# Patient Record
Sex: FEMALE | Race: WHITE
[De-identification: names, ages, dates, MRNs, and addresses within clinical notes are randomized per-mention and may not be internally consistent; named-entity substitution may affect disease eponyms.]

---

## 2018-01-08 ENCOUNTER — HOSPITAL ENCOUNTER (EMERGENCY)
Dept: HOSPITAL 54 - ER | Age: 43
Discharge: LEFT BEFORE DISCHARGE | End: 2018-01-08
Payer: MEDICAID

## 2018-01-08 VITALS — WEIGHT: 220 LBS | HEIGHT: 64 IN | BODY MASS INDEX: 37.56 KG/M2

## 2018-01-08 VITALS — SYSTOLIC BLOOD PRESSURE: 139 MMHG | DIASTOLIC BLOOD PRESSURE: 50 MMHG

## 2018-01-08 DIAGNOSIS — Z79.4: ICD-10-CM

## 2018-01-08 DIAGNOSIS — I25.2: ICD-10-CM

## 2018-01-08 DIAGNOSIS — I10: ICD-10-CM

## 2018-01-08 DIAGNOSIS — I21.4: Primary | ICD-10-CM

## 2018-01-08 DIAGNOSIS — E66.9: ICD-10-CM

## 2018-01-08 DIAGNOSIS — E78.00: ICD-10-CM

## 2018-01-08 DIAGNOSIS — E11.9: ICD-10-CM

## 2018-01-08 DIAGNOSIS — Z79.82: ICD-10-CM

## 2018-01-08 DIAGNOSIS — Z88.0: ICD-10-CM

## 2018-01-08 LAB
APTT PPP: 27 SEC (ref 23–34)
BASOPHILS # BLD AUTO: 0.1 /CMM (ref 0–0.2)
BASOPHILS NFR BLD AUTO: 0.6 % (ref 0–2)
BUN SERPL-MCNC: 19 MG/DL (ref 7–18)
CALCIUM SERPL-MCNC: 8.9 MG/DL (ref 8.5–10.1)
CHLORIDE SERPL-SCNC: 101 MMOL/L (ref 98–107)
CO2 SERPL-SCNC: 23 MMOL/L (ref 21–32)
CREAT SERPL-MCNC: 1 MG/DL (ref 0.6–1.3)
EOSINOPHIL # BLD AUTO: 0.3 /CMM (ref 0–0.7)
EOSINOPHIL NFR BLD AUTO: 2 % (ref 0–6)
GLUCOSE SERPL-MCNC: 370 MG/DL (ref 74–106)
HCT VFR BLD AUTO: 35 % (ref 33–45)
HGB BLD-MCNC: 11.2 G/DL (ref 11.5–14.8)
INR PPP: 0.91 (ref 0.87–1.13)
LYMPHOCYTES NFR BLD AUTO: 16.9 % (ref 20–44)
LYMPHOCYTES NFR BLD AUTO: 2.2 /CMM (ref 0.8–4.8)
MCH RBC QN AUTO: 27 PG (ref 26–33)
MCHC RBC AUTO-ENTMCNC: 32 G/DL (ref 31–36)
MCV RBC AUTO: 84 FL (ref 82–100)
MONOCYTES NFR BLD AUTO: 0.9 /CMM (ref 0.1–1.3)
MONOCYTES NFR BLD AUTO: 6.9 % (ref 2–12)
NEUTROPHILS # BLD AUTO: 9.8 /CMM (ref 1.8–8.9)
NEUTROPHILS NFR BLD AUTO: 73.6 % (ref 43–81)
PLATELET # BLD AUTO: 415 /CMM (ref 150–450)
POTASSIUM SERPL-SCNC: 3.8 MMOL/L (ref 3.5–5.1)
RBC # BLD AUTO: 4.13 MIL/UL (ref 4–5.2)
RDW COEFFICIENT OF VARIATION: 15.1 (ref 11.5–15)
SODIUM SERPL-SCNC: 137 MMOL/L (ref 136–145)
TROPONIN I SERPL-MCNC: 0.22 NG/ML (ref 0–0.06)
WBC NRBC COR # BLD AUTO: 13.3 K/UL (ref 4.3–11)

## 2018-01-08 PROCEDURE — 84484 ASSAY OF TROPONIN QUANT: CPT

## 2018-01-08 PROCEDURE — 36415 COLL VENOUS BLD VENIPUNCTURE: CPT

## 2018-01-08 PROCEDURE — 99285 EMERGENCY DEPT VISIT HI MDM: CPT

## 2018-01-08 PROCEDURE — 85025 COMPLETE CBC W/AUTO DIFF WBC: CPT

## 2018-01-08 PROCEDURE — Z7610: HCPCS

## 2018-01-08 PROCEDURE — 87081 CULTURE SCREEN ONLY: CPT

## 2018-01-08 PROCEDURE — 93005 ELECTROCARDIOGRAM TRACING: CPT

## 2018-01-08 PROCEDURE — 96372 THER/PROPH/DIAG INJ SC/IM: CPT

## 2018-01-08 PROCEDURE — 80048 BASIC METABOLIC PNL TOTAL CA: CPT

## 2018-01-08 PROCEDURE — 71045 X-RAY EXAM CHEST 1 VIEW: CPT

## 2018-01-08 PROCEDURE — 85730 THROMBOPLASTIN TIME PARTIAL: CPT

## 2018-01-08 PROCEDURE — 96375 TX/PRO/DX INJ NEW DRUG ADDON: CPT

## 2018-01-08 PROCEDURE — A4606 OXYGEN PROBE USED W OXIMETER: HCPCS

## 2018-01-08 PROCEDURE — 96374 THER/PROPH/DIAG INJ IV PUSH: CPT

## 2018-01-08 NOTE — NUR
patient is resting in er bed, no distress noted, patient is on cardiac monitor. 
will continue to monitor

## 2018-01-08 NOTE — NUR
41 YO FEMALE BB SELF. PATIENT IS A/O X 3, C/O NOSE BLEED AND CHEST PAIN. 
PATIENT DESCRIBES PAIN AS PRESSURE LIKE, NON RADIATING. PATIENT AMBULATED TO ER 
BED, SKIN WARM AND DRY, RESP EVEN AND UNLABORED. PATIENT GOWNED,PLACED ON 
CARDIAC MONITOR. AWAITING ORDERS FROM PROVIDER, WILL CONTINUE TO MONITOR

## 2018-01-08 NOTE — NUR
Patient does not wish to proceed with medical care recommended by Dr. Laguna. 
Patient given information related to possible complications, up to and 
including death, which could occur as a result of leaving the hospital at this 
time. Patient verbalizes understanding of risks involved due to leaving against 
medical advice. Patient refused to sign AMA form.

## 2018-10-13 ENCOUNTER — HOSPITAL ENCOUNTER (EMERGENCY)
Dept: HOSPITAL 54 - ER | Age: 43
Discharge: HOME | End: 2018-10-13
Payer: MEDICAID

## 2018-10-13 VITALS — HEIGHT: 64 IN | BODY MASS INDEX: 39.27 KG/M2 | WEIGHT: 230 LBS

## 2018-10-13 VITALS — SYSTOLIC BLOOD PRESSURE: 142 MMHG | DIASTOLIC BLOOD PRESSURE: 65 MMHG

## 2018-10-13 DIAGNOSIS — I10: ICD-10-CM
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
## 2018-10-13 DIAGNOSIS — S80.12XD: Primary | ICD-10-CM

## 2018-10-13 DIAGNOSIS — Z79.82: ICD-10-CM

## 2018-10-13 DIAGNOSIS — E11.9: ICD-10-CM

## 2018-10-13 DIAGNOSIS — Z88.0: ICD-10-CM

## 2018-10-13 DIAGNOSIS — Z98.890: ICD-10-CM

## 2018-10-13 DIAGNOSIS — Z95.1: ICD-10-CM

## 2018-10-13 DIAGNOSIS — Z79.4: ICD-10-CM

## 2018-10-13 DIAGNOSIS — I25.2: ICD-10-CM

## 2018-10-13 DIAGNOSIS — E78.00: ICD-10-CM

## 2018-10-13 PROCEDURE — Z7610: HCPCS

## 2018-10-13 PROCEDURE — A6402 STERILE GAUZE <= 16 SQ IN: HCPCS

## 2018-10-13 PROCEDURE — A4606 OXYGEN PROBE USED W OXIMETER: HCPCS

## 2018-10-13 NOTE — NUR
BIB  C/O LEFT LEG PAIN AND BLEEDING FROM PREVIOUS SURGERY 8/10 [place] 
PAIN. RECENT HEART SURGERY 10/02/18 AT [facility]. , ATTACHED TO MONITOR , VSS 
AFEBRILE , WILL CONTINUE TO MONITOR

## 2019-04-11 ENCOUNTER — HOSPITAL ENCOUNTER (INPATIENT)
Dept: HOSPITAL 54 - ER | Age: 44
LOS: 1 days | Discharge: LEFT BEFORE DISCHARGE | DRG: 145 | End: 2019-04-12
Attending: INTERNAL MEDICINE | Admitting: INTERNAL MEDICINE
Payer: MEDICAID

## 2019-04-11 VITALS — BODY MASS INDEX: 39.95 KG/M2 | WEIGHT: 234 LBS | HEIGHT: 64 IN

## 2019-04-11 VITALS — DIASTOLIC BLOOD PRESSURE: 69 MMHG | SYSTOLIC BLOOD PRESSURE: 126 MMHG

## 2019-04-11 VITALS — SYSTOLIC BLOOD PRESSURE: 182 MMHG | DIASTOLIC BLOOD PRESSURE: 90 MMHG

## 2019-04-11 VITALS — SYSTOLIC BLOOD PRESSURE: 140 MMHG | DIASTOLIC BLOOD PRESSURE: 62 MMHG

## 2019-04-11 DIAGNOSIS — I12.9: ICD-10-CM

## 2019-04-11 DIAGNOSIS — I25.110: ICD-10-CM

## 2019-04-11 DIAGNOSIS — Z88.0: ICD-10-CM

## 2019-04-11 DIAGNOSIS — J20.9: Primary | ICD-10-CM

## 2019-04-11 DIAGNOSIS — N18.3: ICD-10-CM

## 2019-04-11 DIAGNOSIS — E11.22: ICD-10-CM

## 2019-04-11 DIAGNOSIS — K80.10: ICD-10-CM

## 2019-04-11 DIAGNOSIS — N17.9: ICD-10-CM

## 2019-04-11 DIAGNOSIS — Z95.1: ICD-10-CM

## 2019-04-11 DIAGNOSIS — E78.5: ICD-10-CM

## 2019-04-11 DIAGNOSIS — E66.9: ICD-10-CM

## 2019-04-11 LAB
BASOPHILS # BLD AUTO: 0.1 /CMM (ref 0–0.2)
BASOPHILS NFR BLD AUTO: 0.8 % (ref 0–2)
BUN SERPL-MCNC: 33 MG/DL (ref 7–18)
CALCIUM SERPL-MCNC: 8.7 MG/DL (ref 8.5–10.1)
CHLORIDE SERPL-SCNC: 102 MMOL/L (ref 98–107)
CO2 SERPL-SCNC: 23 MMOL/L (ref 21–32)
CREAT SERPL-MCNC: 2 MG/DL (ref 0.6–1.3)
EOSINOPHIL NFR BLD AUTO: 4.4 % (ref 0–6)
GLUCOSE SERPL-MCNC: 139 MG/DL (ref 74–106)
HCT VFR BLD AUTO: 33 % (ref 33–45)
HGB BLD-MCNC: 11 G/DL (ref 11.5–14.8)
LYMPHOCYTES NFR BLD AUTO: 19.4 % (ref 20–44)
LYMPHOCYTES NFR BLD AUTO: 2 /CMM (ref 0.8–4.8)
MCHC RBC AUTO-ENTMCNC: 33 G/DL (ref 31–36)
MCV RBC AUTO: 91 FL (ref 82–100)
MONOCYTES NFR BLD AUTO: 0.7 /CMM (ref 0.1–1.3)
MONOCYTES NFR BLD AUTO: 6.9 % (ref 2–12)
NEUTROPHILS # BLD AUTO: 7 /CMM (ref 1.8–8.9)
NEUTROPHILS NFR BLD AUTO: 68.5 % (ref 43–81)
PLATELET # BLD AUTO: 471 /CMM (ref 150–450)
POTASSIUM SERPL-SCNC: 4.3 MMOL/L (ref 3.5–5.1)
RBC # BLD AUTO: 3.65 MIL/UL (ref 4–5.2)
SODIUM SERPL-SCNC: 134 MMOL/L (ref 136–145)
WBC NRBC COR # BLD AUTO: 10.3 K/UL (ref 4.3–11)

## 2019-04-11 PROCEDURE — G0378 HOSPITAL OBSERVATION PER HR: HCPCS

## 2019-04-11 PROCEDURE — A9502 TC99M TETROFOSMIN: HCPCS

## 2019-04-11 RX ADMIN — Medication SCH EACH: at 18:15

## 2019-04-11 RX ADMIN — LINAGLIPTIN SCH MG: 5 TABLET, FILM COATED ORAL at 10:14

## 2019-04-11 RX ADMIN — Medication SCH EACH: at 22:21

## 2019-04-11 RX ADMIN — SPIRONOLACTONE SCH MG: 25 TABLET, FILM COATED ORAL at 10:15

## 2019-04-11 RX ADMIN — GLIMEPIRIDE SCH MG: 4 TABLET ORAL at 18:12

## 2019-04-11 RX ADMIN — Medication SCH MG: at 18:12

## 2019-04-11 RX ADMIN — GABAPENTIN SCH MG: 100 CAPSULE ORAL at 18:12

## 2019-04-11 RX ADMIN — FENOFIBRATE SCH MG: 145 TABLET ORAL at 10:14

## 2019-04-11 RX ADMIN — AMLODIPINE BESYLATE SCH MG: 10 TABLET ORAL at 10:15

## 2019-04-11 RX ADMIN — Medication PRN EACH: at 18:12

## 2019-04-11 RX ADMIN — GLIMEPIRIDE SCH MG: 4 TABLET ORAL at 10:16

## 2019-04-11 RX ADMIN — FERROUS SULFATE TAB 325 MG (65 MG ELEMENTAL FE) SCH MG: 325 (65 FE) TAB at 10:13

## 2019-04-11 RX ADMIN — AZITHROMYCIN DIHYDRATE SCH MG: 250 TABLET, FILM COATED ORAL at 18:15

## 2019-04-11 RX ADMIN — Medication SCH EACH: at 12:00

## 2019-04-11 RX ADMIN — GABAPENTIN SCH MG: 100 CAPSULE ORAL at 14:02

## 2019-04-11 RX ADMIN — FERROUS SULFATE TAB 325 MG (65 MG ELEMENTAL FE) SCH MG: 325 (65 FE) TAB at 18:12

## 2019-04-11 RX ADMIN — SODIUM CHLORIDE PRN MLS/HR: 4.5 INJECTION, SOLUTION INTRAVENOUS at 14:04

## 2019-04-11 RX ADMIN — Medication PRN EACH: at 10:07

## 2019-04-11 RX ADMIN — MONTELUKAST SODIUM SCH MG: 10 TABLET, FILM COATED ORAL at 10:13

## 2019-04-11 RX ADMIN — ASPIRIN 81 MG SCH MG: 81 TABLET ORAL at 10:14

## 2019-04-11 RX ADMIN — Medication SCH MG: at 10:14

## 2019-04-11 RX ADMIN — PANTOPRAZOLE SODIUM SCH MG: 40 TABLET, DELAYED RELEASE ORAL at 10:13

## 2019-04-11 RX ADMIN — Medication SCH MG: at 18:13

## 2019-04-11 RX ADMIN — GABAPENTIN SCH MG: 100 CAPSULE ORAL at 10:13

## 2019-04-11 NOTE — NUR
Patient lives at home locally with spouse. She is ambulatory and independent with adl's. 
Patient and  is requesting transfer to [facility] where she has a scheduled 
urology procedure to be done this Tuesday by Dr. Barry Vincent [phone]. Spoke with Traci 
wale haile mgr at [place] [phone], she will contact the transfer team for 
coordination.   










-------------------------------------------------------------------------------

Addendum: 04/11/19 at 2043 by KRISTA GONZALES RN

-------------------------------------------------------------------------------

Amended: Links added.

## 2019-04-11 NOTE — NUR
CHANDA NOTES



HS ACCUCHECK . Pt WILL BE NPO STARTING MN TONIGHT. DID NOT ADMINISTER INSULIN 
COVERAGE. 

-------------------------------------------------------------------------------

Addendum: 04/11/19 at 2234 by ERIN RICHARDS RN

-------------------------------------------------------------------------------

SPOKE WITH CHARGE CHANDA ELLIS TO GIVE INSULIN TONIGHT. WILL ADMINISTER 2UN OF INSULIN PER SLIDING 
SCALE.

## 2019-04-11 NOTE — NUR
MS RN

RECEIVED A NEW ADMISSION, 44 YEAR OLD FEMALE, CAME IN W/ CC OF CHEST PAIN,HIGH B/P, NOT IN 
DISTRESS, AWAKE,ALERT,ORIENTED X4, WILL MONITOR  PATIENT'S CONDITION,ALL NEEDS ATTENDED.

## 2019-04-11 NOTE — NUR
RN NOTES



SPOKE WITH CM ON THE PHONE. SAID Pt WILL NOT BE TRANSFERRED TONIGHT, INSTEAD SHE WILL BE 
TRANSFERRED TO [facility] TOMORROW AFTER THE STRESS TEST IS DONE AND Pt IS MEDICALLY 
CLEARED TO BE TRANSFERRED.

## 2019-04-11 NOTE — NUR
RN OPENING NOTES



RECEIVED REPORT FROM DAYSHIFT RN SUDHA. FOUND Pt ASLEEP IN BED, EASILY AWAKENED. NO S/S OF 
ACUTE DISTRESS  OR SOB NOTED. Pt IS A/OX4, VERBAL, ABLE TO MAKE NEEDS KNOWN. NO C/O PAIN AT 
THIS TIME. IV ACCESS ON RWRIST #22G, IVF 1/2 NS @100ML/HR. Pt IS SCHEDULED TO HAVE STRESS 
TEST TOMORROW IN AM; CONSENT SIGNED, PLACED IN CHART. SAFETY MEASURES IN PLACE. BED LOW, 
LOCKED, HOB ELEVATED, SIDE RAILS UP, CALL LIGHT AND BEDSIDE TABLE WITHIN REACH. WILL 
CONTINUE TO MONITOR Pt's CONDITION AND SAFETY THROUGHOUT THE NIGHT.

## 2019-04-11 NOTE — NUR
PT HYPERTENSIVE ON THE MONITOR. ER MD AWARE. VERBAL ORDER OF 10MG HYDRALAZINE 
IVP. WILL CARRY OUT ORDERS.

## 2019-04-11 NOTE — NUR
PT BIBSELF COMPLAINING OF L FLANK PAIN, WITH CHEST PAIN. PT AXO4. PT ANXIOUS, 
RESPIRATIONS EVEN AND UNLABORED. PT PUT ON THE CARDIAC MONITOR AND PULSE OX.

## 2019-04-11 NOTE — NUR
Spoke with Oralia JONES at [facility], patient is not cleared for transfer per 
, patient will need stress test tomorrow and will decide if patient is cleared for 
the urology procedure. [facility]  will coordinate transfer if cleared by 
Dr. Fisher and cardiology.  and patient is aware and agreed with current plan of care.  
 

-------------------------------------------------------------------------------

Addendum: 04/11/19 at 2050 by KRISTA GONZALES RN

-------------------------------------------------------------------------------

Amended: Links added.

## 2019-04-12 VITALS — DIASTOLIC BLOOD PRESSURE: 67 MMHG | SYSTOLIC BLOOD PRESSURE: 149 MMHG

## 2019-04-12 VITALS — DIASTOLIC BLOOD PRESSURE: 70 MMHG | SYSTOLIC BLOOD PRESSURE: 129 MMHG

## 2019-04-12 VITALS — SYSTOLIC BLOOD PRESSURE: 125 MMHG | DIASTOLIC BLOOD PRESSURE: 68 MMHG

## 2019-04-12 LAB
BASOPHILS # BLD AUTO: 0.1 /CMM (ref 0–0.2)
BASOPHILS NFR BLD AUTO: 0.7 % (ref 0–2)
BUN SERPL-MCNC: 31 MG/DL (ref 7–18)
CALCIUM SERPL-MCNC: 8.9 MG/DL (ref 8.5–10.1)
CHLORIDE SERPL-SCNC: 103 MMOL/L (ref 98–107)
CO2 SERPL-SCNC: 22 MMOL/L (ref 21–32)
CREAT SERPL-MCNC: 1.8 MG/DL (ref 0.6–1.3)
EOSINOPHIL NFR BLD AUTO: 4.7 % (ref 0–6)
GLUCOSE SERPL-MCNC: 129 MG/DL (ref 74–106)
HCT VFR BLD AUTO: 32 % (ref 33–45)
HGB BLD-MCNC: 10.7 G/DL (ref 11.5–14.8)
LYMPHOCYTES NFR BLD AUTO: 1.5 /CMM (ref 0.8–4.8)
LYMPHOCYTES NFR BLD AUTO: 17.5 % (ref 20–44)
MCHC RBC AUTO-ENTMCNC: 34 G/DL (ref 31–36)
MCV RBC AUTO: 89 FL (ref 82–100)
MONOCYTES NFR BLD AUTO: 0.8 /CMM (ref 0.1–1.3)
MONOCYTES NFR BLD AUTO: 8.7 % (ref 2–12)
NEUTROPHILS # BLD AUTO: 6 /CMM (ref 1.8–8.9)
NEUTROPHILS NFR BLD AUTO: 68.4 % (ref 43–81)
PLATELET # BLD AUTO: 366 /CMM (ref 150–450)
POTASSIUM SERPL-SCNC: 5.2 MMOL/L (ref 3.5–5.1)
RBC # BLD AUTO: 3.58 MIL/UL (ref 4–5.2)
SODIUM SERPL-SCNC: 135 MMOL/L (ref 136–145)
WBC NRBC COR # BLD AUTO: 8.8 K/UL (ref 4.3–11)

## 2019-04-12 RX ADMIN — PANTOPRAZOLE SODIUM SCH MG: 40 TABLET, DELAYED RELEASE ORAL at 09:50

## 2019-04-12 RX ADMIN — Medication SCH MG: at 09:51

## 2019-04-12 RX ADMIN — Medication PRN EACH: at 14:28

## 2019-04-12 RX ADMIN — Medication PRN EACH: at 09:49

## 2019-04-12 RX ADMIN — GLIMEPIRIDE SCH MG: 4 TABLET ORAL at 09:49

## 2019-04-12 RX ADMIN — AZITHROMYCIN DIHYDRATE SCH MG: 250 TABLET, FILM COATED ORAL at 13:20

## 2019-04-12 RX ADMIN — AMLODIPINE BESYLATE SCH MG: 10 TABLET ORAL at 09:50

## 2019-04-12 RX ADMIN — Medication SCH EACH: at 06:30

## 2019-04-12 RX ADMIN — GABAPENTIN SCH MG: 100 CAPSULE ORAL at 13:20

## 2019-04-12 RX ADMIN — MONTELUKAST SODIUM SCH MG: 10 TABLET, FILM COATED ORAL at 09:50

## 2019-04-12 RX ADMIN — SPIRONOLACTONE SCH MG: 25 TABLET, FILM COATED ORAL at 09:49

## 2019-04-12 RX ADMIN — FERROUS SULFATE TAB 325 MG (65 MG ELEMENTAL FE) SCH MG: 325 (65 FE) TAB at 09:50

## 2019-04-12 RX ADMIN — Medication SCH MG: at 09:50

## 2019-04-12 RX ADMIN — SODIUM CHLORIDE PRN MLS/HR: 4.5 INJECTION, SOLUTION INTRAVENOUS at 02:30

## 2019-04-12 RX ADMIN — Medication SCH EACH: at 12:12

## 2019-04-12 RX ADMIN — LINAGLIPTIN SCH MG: 5 TABLET, FILM COATED ORAL at 09:50

## 2019-04-12 RX ADMIN — FENOFIBRATE SCH MG: 145 TABLET ORAL at 09:49

## 2019-04-12 RX ADMIN — GABAPENTIN SCH MG: 100 CAPSULE ORAL at 09:49

## 2019-04-12 RX ADMIN — ASPIRIN 81 MG SCH MG: 81 TABLET ORAL at 09:49

## 2019-04-12 NOTE — NUR
MS RN NOTES



PATIENT LEFT HOSPITAL AGAINST MEDICAL ADVICE. VERBALIZED SHE DOES NOT WANT TO WAIT FOR 
TRANSPORTATION TO BE ARRANGED. ALSO VERBALIZED THAT SHE AND THE  WANT TO GO TO 
[facility] AS THE PATIENT HAD PRIOR HOSPITALIZATION THERE AND THAT THEY HAVE 
THE PATIENT'S RECORDS. RISKS AND BENEFITS EXPLAINED BUT TO NO AVAIL. PATIENT STATED THAT SHE 
WANTS TO LEAVE IMMEDIATELY. DR. ANGEL MADE AWARE AND GAVE OK. PATIENT SIGNED AMA FORM. ALL 
BELONGINGS COMPLETE, NO REPORT OF MISSING INVENTORY. IV REMOVED WITH MINIMAL BLEEDING NOTED, 
PRESSURE DRESSING PLACED ON SITE. ID BAND REMOVED. PATIENT LEFT UNIT AMBULATORY, ACCOMPANIED 
BY NURSING STAFF. PATIENT LEFT AMBULATORY. NO CHANGES IN LOC NOTED. DENIES ANY PAIN OR 
DISCOMFORT. NO ACUTE DISTRESS. NO NEW SKIN BREAKDOWN NOTED. LEFT HOSPITAL PREMISES VIA 
PRIVATE CAR WITH  GENE.

## 2019-04-12 NOTE — NUR
MS RN NOTES



RECEIVED CALL FROM CASE MANAGEMENT. REPORTING THAT PATIENT WILL BE TRANSFERRED TO [facility] FOR CARDIAC CATHETERIZATION. SPOKE WITH DR. ANGEL OVER THE PHONE STATED THAT HE IS 
AWARE. PATIENT AND  GENE AT BEDSIDE MADE AWARE AND VERBALIZED UNDERSTANDING.

## 2019-04-12 NOTE — NUR
RN CLOSING NOTES



NO SIGNIFICANT CHANGES IN Pt's CONDITION. Pt REMAINS STABLE AT THIS TIME. NO S/S OF ACUTE 
DISTRESS OR SOB NOTED DURING THE NIGHT. ALL NEEDS MET AND ATTENDED TO. Pt IS CURRENTLY 
RESTING IN BED WITH EVEN AND UNLABORED RESPIRATIONS. TELE READING SR 70s. SAFETY MEASURES IN 
PLACE. WILL ENDORSE TO DAYSHIFT RN FOR Pt's FLORENCE. Pt HAS BEEN NPO SINCE MIDNIGHT TONIGHT. 

-------------------------------------------------------------------------------

Addendum: 04/12/19 at 0641 by ERIN RICHARDS RN

-------------------------------------------------------------------------------

STRESS TEST TODAY

## 2019-04-12 NOTE — NUR
TELE RN NOTES



PATIENT RECEIVED RESTING INSIDE ROOM. AWAKE, ALERT AND ORIENTED X 3, VERBALLY RESPONSIVE AND 
RESPONDS TO VERBAL AND TACTILE STIMULI. NO ACUTE DISTRESS AT THIS TIME. NO CHANGES IN LOC 
NOTED AT THIS TIME. PATIENT CALM AND RELAXED. ON NPO STATUS. AWAITING FOR STRESS TEST. 
PATIENT AWARE AND VERBALIZED UNDERSTANDING. WILL CONTINUE TO MONITOR. BED LOCKED AND IN LOW 
POSITION. BILATERAL UPPER SIDE RAILS UP AND LOCKED. CALL LIGHT WITHIN EASY REACH

## 2019-04-22 ENCOUNTER — HOSPITAL ENCOUNTER (EMERGENCY)
Dept: HOSPITAL 54 - ER | Age: 44
Discharge: HOME | End: 2019-04-22
Payer: MEDICAID

## 2019-04-22 VITALS — BODY MASS INDEX: 41.41 KG/M2 | WEIGHT: 225 LBS | HEIGHT: 62 IN

## 2019-04-22 VITALS — SYSTOLIC BLOOD PRESSURE: 188 MMHG | DIASTOLIC BLOOD PRESSURE: 100 MMHG

## 2019-04-22 DIAGNOSIS — Z79.4: ICD-10-CM

## 2019-04-22 DIAGNOSIS — Z88.0: ICD-10-CM

## 2019-04-22 DIAGNOSIS — Z95.5: ICD-10-CM

## 2019-04-22 DIAGNOSIS — I25.10: ICD-10-CM

## 2019-04-22 DIAGNOSIS — Z90.710: ICD-10-CM

## 2019-04-22 DIAGNOSIS — Z95.1: ICD-10-CM

## 2019-04-22 DIAGNOSIS — I10: ICD-10-CM

## 2019-04-22 DIAGNOSIS — N39.0: Primary | ICD-10-CM

## 2019-04-22 DIAGNOSIS — E11.9: ICD-10-CM

## 2019-04-22 DIAGNOSIS — Z98.890: ICD-10-CM

## 2019-04-22 DIAGNOSIS — Z79.82: ICD-10-CM

## 2019-04-22 DIAGNOSIS — J45.909: ICD-10-CM

## 2019-04-22 LAB
ALBUMIN SERPL BCP-MCNC: 3.6 G/DL (ref 3.4–5)
ALP SERPL-CCNC: 63 U/L (ref 46–116)
ALT SERPL W P-5'-P-CCNC: 25 U/L (ref 12–78)
APAP SERPL-MCNC: 0 UG/ML (ref 10–30)
APPEARANCE UR: (no result)
AST SERPL W P-5'-P-CCNC: 18 U/L (ref 15–37)
BASOPHILS # BLD AUTO: 0.1 /CMM (ref 0–0.2)
BASOPHILS NFR BLD AUTO: 0.7 % (ref 0–2)
BILIRUB DIRECT SERPL-MCNC: 0.1 MG/DL (ref 0–0.2)
BILIRUB SERPL-MCNC: 0.2 MG/DL (ref 0.2–1)
BUN SERPL-MCNC: 20 MG/DL (ref 7–18)
CALCIUM SERPL-MCNC: 9.1 MG/DL (ref 8.5–10.1)
CHLORIDE SERPL-SCNC: 101 MMOL/L (ref 98–107)
CO2 SERPL-SCNC: 29 MMOL/L (ref 21–32)
CREAT SERPL-MCNC: 1.6 MG/DL (ref 0.6–1.3)
EOSINOPHIL NFR BLD AUTO: 11.6 % (ref 0–6)
GLUCOSE SERPL-MCNC: 167 MG/DL (ref 74–106)
GLUCOSE UR STRIP-MCNC: (no result) MG/DL
HCT VFR BLD AUTO: 35 % (ref 33–45)
HGB BLD-MCNC: 11.4 G/DL (ref 11.5–14.8)
LIPASE SERPL-CCNC: 229 U/L (ref 73–393)
LYMPHOCYTES NFR BLD AUTO: 1.5 /CMM (ref 0.8–4.8)
LYMPHOCYTES NFR BLD AUTO: 12.1 % (ref 20–44)
MCHC RBC AUTO-ENTMCNC: 33 G/DL (ref 31–36)
MCV RBC AUTO: 91 FL (ref 82–100)
MONOCYTES NFR BLD AUTO: 0.8 /CMM (ref 0.1–1.3)
MONOCYTES NFR BLD AUTO: 6.8 % (ref 2–12)
NEUTROPHILS # BLD AUTO: 8.5 /CMM (ref 1.8–8.9)
NEUTROPHILS NFR BLD AUTO: 68.8 % (ref 43–81)
PH UR STRIP: 5.5 [PH] (ref 5–8)
PLATELET # BLD AUTO: 504 /CMM (ref 150–450)
POTASSIUM SERPL-SCNC: 4.1 MMOL/L (ref 3.5–5.1)
PROT SERPL-MCNC: 7.9 G/DL (ref 6.4–8.2)
PROT UR QL STRIP: 30 MG/DL
RBC # BLD AUTO: 3.82 MIL/UL (ref 4–5.2)
RBC #/AREA URNS HPF: (no result) /HPF (ref 0–2)
SODIUM SERPL-SCNC: 138 MMOL/L (ref 136–145)
UROBILINOGEN UR STRIP-MCNC: 0.2 EU/DL
WBC #/AREA URNS HPF: (no result) /HPF
WBC #/AREA URNS HPF: (no result) /HPF (ref 0–3)
WBC NRBC COR # BLD AUTO: 12.4 K/UL (ref 4.3–11)

## 2019-04-22 PROCEDURE — 74176 CT ABD & PELVIS W/O CONTRAST: CPT

## 2019-04-22 PROCEDURE — 83690 ASSAY OF LIPASE: CPT

## 2019-04-22 PROCEDURE — 71045 X-RAY EXAM CHEST 1 VIEW: CPT

## 2019-04-22 PROCEDURE — 81001 URINALYSIS AUTO W/SCOPE: CPT

## 2019-04-22 PROCEDURE — 80076 HEPATIC FUNCTION PANEL: CPT

## 2019-04-22 PROCEDURE — 96375 TX/PRO/DX INJ NEW DRUG ADDON: CPT

## 2019-04-22 PROCEDURE — 99284 EMERGENCY DEPT VISIT MOD MDM: CPT

## 2019-04-22 PROCEDURE — 80048 BASIC METABOLIC PNL TOTAL CA: CPT

## 2019-04-22 PROCEDURE — 84703 CHORIONIC GONADOTROPIN ASSAY: CPT

## 2019-04-22 PROCEDURE — 93005 ELECTROCARDIOGRAM TRACING: CPT

## 2019-04-22 PROCEDURE — 36415 COLL VENOUS BLD VENIPUNCTURE: CPT

## 2019-04-22 PROCEDURE — 85025 COMPLETE CBC W/AUTO DIFF WBC: CPT

## 2019-04-22 PROCEDURE — 96374 THER/PROPH/DIAG INJ IV PUSH: CPT

## 2019-04-22 PROCEDURE — G0480 DRUG TEST DEF 1-7 CLASSES: HCPCS

## 2019-04-22 PROCEDURE — 84484 ASSAY OF TROPONIN QUANT: CPT

## 2019-04-22 NOTE — NUR
PT BIBFAMILY FOR LT SIDE BACK PAIN S/P SX ON FRIDAY; PT AAOX4, PT AMBULATORY, 
NAD NOTED, VSS, PENDING MD VIVEROS

## 2019-05-05 ENCOUNTER — HOSPITAL ENCOUNTER (EMERGENCY)
Dept: HOSPITAL 10 - E/R | Age: 44
LOS: 1 days | Discharge: HOME | End: 2019-05-06
Payer: COMMERCIAL

## 2019-05-05 ENCOUNTER — HOSPITAL ENCOUNTER (EMERGENCY)
Dept: HOSPITAL 91 - E/R | Age: 44
LOS: 1 days | Discharge: HOME | End: 2019-05-06
Payer: COMMERCIAL

## 2019-05-05 VITALS
WEIGHT: 244.05 LBS | WEIGHT: 244.05 LBS | BODY MASS INDEX: 44.91 KG/M2 | BODY MASS INDEX: 44.91 KG/M2 | HEIGHT: 62 IN | HEIGHT: 62 IN

## 2019-05-05 DIAGNOSIS — R10.84: ICD-10-CM

## 2019-05-05 DIAGNOSIS — I10: ICD-10-CM

## 2019-05-05 DIAGNOSIS — N28.9: Primary | ICD-10-CM

## 2019-05-05 DIAGNOSIS — J45.909: ICD-10-CM

## 2019-05-05 DIAGNOSIS — Z79.4: ICD-10-CM

## 2019-05-05 DIAGNOSIS — Z95.1: ICD-10-CM

## 2019-05-05 DIAGNOSIS — Z79.82: ICD-10-CM

## 2019-05-05 DIAGNOSIS — E11.9: ICD-10-CM

## 2019-05-05 LAB
ADD MAN DIFF?: NO
ALANINE AMINOTRANSFERASE: 17 IU/L (ref 13–69)
ALBUMIN/GLOBULIN RATIO: 1.33
ALBUMIN: 4.8 G/DL (ref 3.3–4.9)
ALKALINE PHOSPHATASE: 66 IU/L (ref 42–121)
ANION GAP: 14 (ref 5–13)
ASPARTATE AMINO TRANSFERASE: 21 IU/L (ref 15–46)
BASOPHIL #: 0.2 10^3/UL (ref 0–0.1)
BASOPHILS %: 1.7 % (ref 0–2)
BILIRUBIN,DIRECT: 0 MG/DL (ref 0–0.2)
BILIRUBIN,TOTAL: 0 MG/DL (ref 0.2–1.3)
BLOOD UREA NITROGEN: 41 MG/DL (ref 7–20)
CALCIUM: 9.5 MG/DL (ref 8.4–10.2)
CARBON DIOXIDE: 23 MMOL/L (ref 21–31)
CHLORIDE: 104 MMOL/L (ref 97–110)
CREATININE: 2.07 MG/DL (ref 0.44–1)
EOSINOPHILS #: 1.2 10^3/UL (ref 0–0.5)
EOSINOPHILS %: 11.3 % (ref 0–7)
GLOBULIN: 3.6 G/DL (ref 1.3–3.2)
GLUCOSE: 185 MG/DL (ref 70–220)
HEMATOCRIT: 35.8 % (ref 37–47)
HEMOGLOBIN: 11.5 G/DL (ref 12–16)
IMMATURE GRANS #M: 0.02 10^3/UL (ref 0–0.03)
IMMATURE GRANS % (M): 0.2 % (ref 0–0.43)
LIPASE: 370 U/L (ref 23–300)
LYMPHOCYTES #: 2.5 10^3/UL (ref 0.8–2.9)
LYMPHOCYTES %: 25 % (ref 15–51)
MEAN CORPUSCULAR HEMOGLOBIN: 29.2 PG (ref 29–33)
MEAN CORPUSCULAR HGB CONC: 32.1 G/DL (ref 32–37)
MEAN CORPUSCULAR VOLUME: 90.9 FL (ref 82–101)
MEAN PLATELET VOLUME: 12.6 FL (ref 7.4–10.4)
MONOCYTE #: 1.1 10^3/UL (ref 0.3–0.9)
MONOCYTES %: 10.7 % (ref 0–11)
NEUTROPHIL #: 5.2 10^3/UL (ref 1.6–7.5)
NEUTROPHILS %: 51.1 % (ref 39–77)
NUCLEATED RED BLOOD CELLS #: 0 10^3/UL (ref 0–0)
NUCLEATED RED BLOOD CELLS%: 0 /100WBC (ref 0–0)
PLATELET COUNT: 497 10^3/UL (ref 140–415)
POTASSIUM: 3.9 MMOL/L (ref 3.5–5.1)
RED BLOOD COUNT: 3.94 10^6/UL (ref 4.2–5.4)
RED CELL DISTRIBUTION WIDTH: 13.2 % (ref 11.5–14.5)
SODIUM: 141 MMOL/L (ref 135–144)
TOTAL PROTEIN: 8.4 G/DL (ref 6.1–8.1)
TROPONIN-I: 0.02 NG/ML (ref 0–0.12)
WHITE BLOOD COUNT: 10.2 10^3/UL (ref 4.8–10.8)

## 2019-05-05 PROCEDURE — 74176 CT ABD & PELVIS W/O CONTRAST: CPT

## 2019-05-05 PROCEDURE — 99285 EMERGENCY DEPT VISIT HI MDM: CPT

## 2019-05-05 PROCEDURE — 96375 TX/PRO/DX INJ NEW DRUG ADDON: CPT

## 2019-05-05 PROCEDURE — 85025 COMPLETE CBC W/AUTO DIFF WBC: CPT

## 2019-05-05 PROCEDURE — 93306 TTE W/DOPPLER COMPLETE: CPT

## 2019-05-05 PROCEDURE — 96374 THER/PROPH/DIAG INJ IV PUSH: CPT

## 2019-05-05 PROCEDURE — 82550 ASSAY OF CK (CPK): CPT

## 2019-05-05 PROCEDURE — 84484 ASSAY OF TROPONIN QUANT: CPT

## 2019-05-05 PROCEDURE — 83690 ASSAY OF LIPASE: CPT

## 2019-05-05 PROCEDURE — 71045 X-RAY EXAM CHEST 1 VIEW: CPT

## 2019-05-05 PROCEDURE — 80053 COMPREHEN METABOLIC PANEL: CPT

## 2019-05-05 PROCEDURE — 93005 ELECTROCARDIOGRAM TRACING: CPT

## 2019-05-05 PROCEDURE — 82553 CREATINE MB FRACTION: CPT

## 2019-05-05 PROCEDURE — 36415 COLL VENOUS BLD VENIPUNCTURE: CPT

## 2019-05-05 RX ADMIN — ONDANSETRON HYDROCHLORIDE 1 MG: 2 INJECTION, SOLUTION INTRAMUSCULAR; INTRAVENOUS at 22:51

## 2019-05-05 RX ADMIN — NITROGLYCERIN 1 INCH: 20 OINTMENT TOPICAL at 22:56

## 2019-05-06 VITALS — SYSTOLIC BLOOD PRESSURE: 184 MMHG | HEART RATE: 59 BPM | DIASTOLIC BLOOD PRESSURE: 92 MMHG | RESPIRATION RATE: 16 BRPM

## 2019-05-06 LAB
CK INDEX: 2.3
CK-MB: 2.8 NG/ML (ref 0–2.4)
CREATINE KINASE: 123 IU/L (ref 23–200)
TROPONIN-I: 0.02 NG/ML (ref 0–0.12)

## 2019-05-06 RX ADMIN — HYDROMORPHONE HYDROCHLORIDE 1 MG: 2 INJECTION, SOLUTION INTRAMUSCULAR; INTRAVENOUS; SUBCUTANEOUS at 08:24

## 2019-05-06 RX ADMIN — DIPHENHYDRAMINE HYDROCHLORIDE 1 MG: 50 INJECTION, SOLUTION INTRAMUSCULAR; INTRAVENOUS at 01:22

## 2019-06-03 ENCOUNTER — HOSPITAL ENCOUNTER (INPATIENT)
Dept: HOSPITAL 12 - ER | Age: 44
LOS: 1 days | Discharge: NURSING, REHAB OR LONG-TERM CARE | DRG: 198 | End: 2019-06-04
Admitting: INTERNAL MEDICINE
Payer: MEDICAID

## 2019-06-03 VITALS — HEIGHT: 62 IN | BODY MASS INDEX: 38.64 KG/M2 | WEIGHT: 210 LBS

## 2019-06-03 DIAGNOSIS — E78.5: ICD-10-CM

## 2019-06-03 DIAGNOSIS — Z95.1: ICD-10-CM

## 2019-06-03 DIAGNOSIS — N18.4: ICD-10-CM

## 2019-06-03 DIAGNOSIS — E11.65: ICD-10-CM

## 2019-06-03 DIAGNOSIS — E66.9: ICD-10-CM

## 2019-06-03 DIAGNOSIS — F32.9: ICD-10-CM

## 2019-06-03 DIAGNOSIS — E11.51: ICD-10-CM

## 2019-06-03 DIAGNOSIS — K59.00: ICD-10-CM

## 2019-06-03 DIAGNOSIS — I25.118: Primary | ICD-10-CM

## 2019-06-03 DIAGNOSIS — Z79.82: ICD-10-CM

## 2019-06-03 DIAGNOSIS — D64.9: ICD-10-CM

## 2019-06-03 DIAGNOSIS — E11.40: ICD-10-CM

## 2019-06-03 DIAGNOSIS — I50.9: ICD-10-CM

## 2019-06-03 DIAGNOSIS — Z95.5: ICD-10-CM

## 2019-06-03 DIAGNOSIS — I31.3: ICD-10-CM

## 2019-06-03 DIAGNOSIS — I13.0: ICD-10-CM

## 2019-06-03 DIAGNOSIS — E11.22: ICD-10-CM

## 2019-06-03 LAB
ALP SERPL-CCNC: 64 U/L (ref 50–136)
ALT SERPL W/O P-5'-P-CCNC: 17 U/L (ref 14–59)
AST SERPL-CCNC: 12 U/L (ref 15–37)
BASOPHILS # BLD AUTO: 0 K/UL (ref 0–8)
BASOPHILS NFR BLD AUTO: 0.2 % (ref 0–2)
BILIRUB DIRECT SERPL-MCNC: 0.1 MG/DL (ref 0–0.2)
BILIRUB SERPL-MCNC: 0.3 MG/DL (ref 0.2–1)
BUN SERPL-MCNC: 38 MG/DL (ref 7–18)
CHLORIDE SERPL-SCNC: 96 MMOL/L (ref 98–107)
CO2 SERPL-SCNC: 24 MMOL/L (ref 21–32)
CREAT SERPL-MCNC: 2.5 MG/DL (ref 0.6–1.3)
EOSINOPHIL # BLD AUTO: 0 K/UL (ref 0–0.7)
EOSINOPHIL NFR BLD AUTO: 0.2 % (ref 0–7)
GLUCOSE SERPL-MCNC: 292 MG/DL (ref 74–106)
HCT VFR BLD AUTO: 33 % (ref 31.2–41.9)
HGB BLD-MCNC: 10.7 G/DL (ref 10.9–14.3)
LYMPHOCYTES # BLD AUTO: 1.2 K/UL (ref 20–40)
LYMPHOCYTES NFR BLD AUTO: 5.9 % (ref 20.5–51.5)
MCH RBC QN AUTO: 28.4 UUG (ref 24.7–32.8)
MCHC RBC AUTO-ENTMCNC: 33 G/DL (ref 32.3–35.6)
MCV RBC AUTO: 87 FL (ref 75.5–95.3)
MONOCYTES # BLD AUTO: 1.8 K/UL (ref 2–10)
MONOCYTES NFR BLD AUTO: 9 % (ref 0–11)
NEUTROPHILS # BLD AUTO: 16.8 K/UL (ref 1.8–8.9)
NEUTROPHILS NFR BLD AUTO: 84.7 % (ref 38.5–71.5)
PLATELET # BLD AUTO: 462 K/UL (ref 179–408)
POTASSIUM SERPL-SCNC: 4.6 MMOL/L (ref 3.5–5.1)
RBC # BLD AUTO: 3.79 MIL/UL (ref 3.63–4.92)
WBC # BLD AUTO: 19.8 K/UL (ref 3.8–11.8)
WS STN SPEC: 8.4 G/DL (ref 6.4–8.2)

## 2019-06-03 PROCEDURE — G0378 HOSPITAL OBSERVATION PER HR: HCPCS

## 2019-06-03 PROCEDURE — A4663 DIALYSIS BLOOD PRESSURE CUFF: HCPCS

## 2019-06-03 NOTE — NUR
Dr. Simmons on panel call with Dr. Scott Steve. Patient accepted for 
admission to [facility], diagnosis: chestpain, renal insufficiency.

## 2019-06-04 VITALS — SYSTOLIC BLOOD PRESSURE: 168 MMHG | DIASTOLIC BLOOD PRESSURE: 84 MMHG

## 2019-06-04 VITALS — SYSTOLIC BLOOD PRESSURE: 158 MMHG | DIASTOLIC BLOOD PRESSURE: 75 MMHG

## 2019-06-04 VITALS — DIASTOLIC BLOOD PRESSURE: 71 MMHG | SYSTOLIC BLOOD PRESSURE: 155 MMHG

## 2019-06-04 VITALS — DIASTOLIC BLOOD PRESSURE: 85 MMHG | SYSTOLIC BLOOD PRESSURE: 175 MMHG

## 2019-06-04 VITALS — DIASTOLIC BLOOD PRESSURE: 80 MMHG | SYSTOLIC BLOOD PRESSURE: 134 MMHG

## 2019-06-04 VITALS — SYSTOLIC BLOOD PRESSURE: 178 MMHG | DIASTOLIC BLOOD PRESSURE: 69 MMHG

## 2019-06-04 LAB
ALP SERPL-CCNC: 73 U/L (ref 50–136)
ALT SERPL W/O P-5'-P-CCNC: 30 U/L (ref 14–59)
AST SERPL-CCNC: 44 U/L (ref 15–37)
BASOPHILS # BLD AUTO: 0.1 K/UL (ref 0–8)
BASOPHILS NFR BLD AUTO: 0.8 % (ref 0–2)
BILIRUB SERPL-MCNC: 0.4 MG/DL (ref 0.2–1)
BUN SERPL-MCNC: 36 MG/DL (ref 7–18)
CHLORIDE SERPL-SCNC: 100 MMOL/L (ref 98–107)
CHOLEST SERPL-MCNC: 132 MG/DL (ref ?–200)
CO2 SERPL-SCNC: 26 MMOL/L (ref 21–32)
CREAT SERPL-MCNC: 2.4 MG/DL (ref 0.6–1.3)
EOSINOPHIL # BLD AUTO: 0.2 K/UL (ref 0–0.7)
EOSINOPHIL NFR BLD AUTO: 1.4 % (ref 0–7)
GLUCOSE SERPL-MCNC: 181 MG/DL (ref 74–106)
HCT VFR BLD AUTO: 32.5 % (ref 31.2–41.9)
HDLC SERPL-MCNC: 56 MG/DL (ref 40–60)
HGB BLD-MCNC: 10.4 G/DL (ref 10.9–14.3)
LYMPHOCYTES # BLD AUTO: 1.8 K/UL (ref 20–40)
LYMPHOCYTES NFR BLD AUTO: 12.6 % (ref 20.5–51.5)
MAGNESIUM SERPL-MCNC: 2.1 MG/DL (ref 1.8–2.4)
MCH RBC QN AUTO: 28.5 UUG (ref 24.7–32.8)
MCHC RBC AUTO-ENTMCNC: 32 G/DL (ref 32.3–35.6)
MCV RBC AUTO: 88.6 FL (ref 75.5–95.3)
MONOCYTES # BLD AUTO: 2 K/UL (ref 2–10)
MONOCYTES NFR BLD AUTO: 14.3 % (ref 0–11)
NEUTROPHILS # BLD AUTO: 9.9 K/UL (ref 1.8–8.9)
NEUTROPHILS NFR BLD AUTO: 70.9 % (ref 38.5–71.5)
PHOSPHATE SERPL-MCNC: 4.2 MG/DL (ref 2.5–4.9)
PLATELET # BLD AUTO: 460 K/UL (ref 179–408)
POTASSIUM SERPL-SCNC: 4.3 MMOL/L (ref 3.5–5.1)
RBC # BLD AUTO: 3.67 MIL/UL (ref 3.63–4.92)
TRIGL SERPL-MCNC: 105 MG/DL (ref 30–150)
TSH SERPL DL<=0.005 MIU/L-ACNC: 5.52 MIU/ML (ref 0.36–3.74)
WBC # BLD AUTO: 14 K/UL (ref 3.8–11.8)
WS STN SPEC: 8.3 G/DL (ref 6.4–8.2)

## 2019-06-04 RX ADMIN — Medication PRN MG: at 12:53

## 2019-06-04 RX ADMIN — SODIUM CHLORIDE PRN UNIT: 9 INJECTION, SOLUTION INTRAVENOUS at 12:52

## 2019-06-04 RX ADMIN — SODIUM CHLORIDE PRN UNIT: 9 INJECTION, SOLUTION INTRAVENOUS at 16:21

## 2019-06-04 RX ADMIN — Medication PRN MG: at 09:00

## 2019-06-04 RX ADMIN — Medication SCH EACH: at 16:19

## 2019-06-04 RX ADMIN — SODIUM CHLORIDE PRN UNIT: 9 INJECTION, SOLUTION INTRAVENOUS at 08:35

## 2019-06-04 RX ADMIN — Medication PRN MG: at 05:09

## 2019-06-04 RX ADMIN — Medication SCH EACH: at 08:33

## 2019-06-04 RX ADMIN — Medication SCH EACH: at 11:35

## 2019-06-04 NOTE — NUR
Patient ordered for discharge, transfer to [facility]. Stable. AAOx4. Ambulatory with 
cane. SR on monitor. No acute distress noted. No SOB.  Discharge packet completed. Report 
given to Essie ARMAS at [facility]. IV on right wrist intact and patent.  Awaiting 
ambulance for transfer.

## 2019-06-04 NOTE — NUR
Pt. is alert oriented x4 and has chest pain. IV in right wrist 22 gauge, patent, intact, hep 
lock. Call light within reach, bed locks, side rails up. Pt. on tele in sinus rhythm.

## 2020-01-20 ENCOUNTER — HOSPITAL ENCOUNTER (EMERGENCY)
Dept: HOSPITAL 54 - ER | Age: 45
Discharge: PSYCHIATRIC OR SUBSTANCE USE FACILITY | End: 2020-01-20
Payer: MEDICAID

## 2020-01-20 VITALS — WEIGHT: 225 LBS | HEIGHT: 63 IN | BODY MASS INDEX: 39.87 KG/M2

## 2020-01-20 VITALS — DIASTOLIC BLOOD PRESSURE: 108 MMHG | SYSTOLIC BLOOD PRESSURE: 186 MMHG

## 2020-01-20 DIAGNOSIS — I10: ICD-10-CM

## 2020-01-20 DIAGNOSIS — Z79.82: ICD-10-CM

## 2020-01-20 DIAGNOSIS — E11.9: ICD-10-CM

## 2020-01-20 DIAGNOSIS — Z88.0: ICD-10-CM

## 2020-01-20 DIAGNOSIS — J45.909: ICD-10-CM

## 2020-01-20 DIAGNOSIS — Z79.4: ICD-10-CM

## 2020-01-20 DIAGNOSIS — Z95.1: ICD-10-CM

## 2020-01-20 DIAGNOSIS — Z79.899: ICD-10-CM

## 2020-01-20 DIAGNOSIS — Z95.5: ICD-10-CM

## 2020-01-20 DIAGNOSIS — Z98.890: ICD-10-CM

## 2020-01-20 DIAGNOSIS — R04.0: Primary | ICD-10-CM

## 2020-01-20 DIAGNOSIS — Z90.710: ICD-10-CM

## 2020-01-20 DIAGNOSIS — I25.10: ICD-10-CM

## 2020-01-20 NOTE — NUR
Patient discharged to home per MD order. Written and verbal after care 
instructions given. Patient verbalizes understanding of instruction. MD aware 
of patient's blood pressure. Medication given Lopressor 50mg.

## 2020-01-20 NOTE — NUR
PT CAME INTO THE ED C/O  R NOSTRIL NOSEBLEED X1MO, WORSE TODAY, +BLOOD 
THINNERS. PT CONNECTED TO THE MONITOR AND POX. MINIMAL BLEEDING NOTED.

## 2020-01-21 ENCOUNTER — HOSPITAL ENCOUNTER (EMERGENCY)
Dept: HOSPITAL 54 - ER | Age: 45
Discharge: HOME | End: 2020-01-21
Payer: MEDICAID

## 2020-01-21 VITALS — WEIGHT: 225 LBS | HEIGHT: 63 IN | BODY MASS INDEX: 39.87 KG/M2

## 2020-01-21 VITALS — SYSTOLIC BLOOD PRESSURE: 183 MMHG | DIASTOLIC BLOOD PRESSURE: 85 MMHG

## 2020-01-21 DIAGNOSIS — R51: ICD-10-CM

## 2020-01-21 DIAGNOSIS — Z79.82: ICD-10-CM

## 2020-01-21 DIAGNOSIS — Z88.0: ICD-10-CM

## 2020-01-21 DIAGNOSIS — I25.10: ICD-10-CM

## 2020-01-21 DIAGNOSIS — J45.909: ICD-10-CM

## 2020-01-21 DIAGNOSIS — R04.0: Primary | ICD-10-CM

## 2020-01-21 DIAGNOSIS — Z79.4: ICD-10-CM

## 2020-01-21 DIAGNOSIS — Z79.899: ICD-10-CM

## 2020-01-21 DIAGNOSIS — Z90.710: ICD-10-CM

## 2020-01-21 DIAGNOSIS — Z98.890: ICD-10-CM

## 2020-01-21 DIAGNOSIS — E11.9: ICD-10-CM

## 2020-01-21 DIAGNOSIS — Z95.818: ICD-10-CM

## 2020-01-21 DIAGNOSIS — R11.0: ICD-10-CM

## 2020-01-21 DIAGNOSIS — D64.9: ICD-10-CM

## 2020-01-21 DIAGNOSIS — I10: ICD-10-CM

## 2020-01-21 DIAGNOSIS — E78.5: ICD-10-CM

## 2020-01-21 PROCEDURE — 99283 EMERGENCY DEPT VISIT LOW MDM: CPT

## 2020-01-21 NOTE — NUR
PATIENT CAMEIN TO THE ER C/O CAME BACK FOR REMOVAL OF NASAL PACKING INSERTED 
LAST NIGHT FOR EPISTAXIS BLOOD PRESSURE ELEVATED IN TRIAGE. ON ROOM AIR, 
BREATHING EVENLY AND UNLABORED. KEPT COMFORTABLE, WILL CONTINUE TO MONITOR 
ACCORDINGLY.

## 2020-01-27 ENCOUNTER — HOSPITAL ENCOUNTER (INPATIENT)
Dept: HOSPITAL 54 - ER | Age: 45
LOS: 2 days | Discharge: HOME | DRG: 139 | End: 2020-01-29
Attending: INTERNAL MEDICINE | Admitting: INTERNAL MEDICINE
Payer: MEDICAID

## 2020-01-27 VITALS — WEIGHT: 263 LBS | HEIGHT: 63 IN | BODY MASS INDEX: 46.6 KG/M2

## 2020-01-27 DIAGNOSIS — E11.22: ICD-10-CM

## 2020-01-27 DIAGNOSIS — J45.909: ICD-10-CM

## 2020-01-27 DIAGNOSIS — E78.5: ICD-10-CM

## 2020-01-27 DIAGNOSIS — N18.3: ICD-10-CM

## 2020-01-27 DIAGNOSIS — I25.10: ICD-10-CM

## 2020-01-27 DIAGNOSIS — I13.10: ICD-10-CM

## 2020-01-27 DIAGNOSIS — E66.9: ICD-10-CM

## 2020-01-27 DIAGNOSIS — Z95.1: ICD-10-CM

## 2020-01-27 DIAGNOSIS — R07.81: ICD-10-CM

## 2020-01-27 DIAGNOSIS — J15.9: Primary | ICD-10-CM

## 2020-01-27 DIAGNOSIS — Z79.4: ICD-10-CM

## 2020-01-27 DIAGNOSIS — Z79.82: ICD-10-CM

## 2020-01-27 DIAGNOSIS — D64.9: ICD-10-CM

## 2020-01-27 PROCEDURE — G0378 HOSPITAL OBSERVATION PER HR: HCPCS

## 2020-01-28 VITALS — SYSTOLIC BLOOD PRESSURE: 145 MMHG | DIASTOLIC BLOOD PRESSURE: 85 MMHG

## 2020-01-28 VITALS — DIASTOLIC BLOOD PRESSURE: 64 MMHG | SYSTOLIC BLOOD PRESSURE: 157 MMHG

## 2020-01-28 VITALS — DIASTOLIC BLOOD PRESSURE: 81 MMHG | SYSTOLIC BLOOD PRESSURE: 155 MMHG

## 2020-01-28 VITALS — SYSTOLIC BLOOD PRESSURE: 143 MMHG | DIASTOLIC BLOOD PRESSURE: 77 MMHG

## 2020-01-28 VITALS — DIASTOLIC BLOOD PRESSURE: 77 MMHG | SYSTOLIC BLOOD PRESSURE: 167 MMHG

## 2020-01-28 VITALS — SYSTOLIC BLOOD PRESSURE: 159 MMHG | DIASTOLIC BLOOD PRESSURE: 71 MMHG

## 2020-01-28 LAB
ALBUMIN SERPL BCP-MCNC: 3.3 G/DL (ref 3.4–5)
ALP SERPL-CCNC: 62 U/L (ref 46–116)
ALT SERPL W P-5'-P-CCNC: 19 U/L (ref 12–78)
AST SERPL W P-5'-P-CCNC: 17 U/L (ref 15–37)
BASOPHILS # BLD AUTO: 0.1 /CMM (ref 0–0.2)
BASOPHILS # BLD AUTO: 0.1 /CMM (ref 0–0.2)
BASOPHILS NFR BLD AUTO: 0.7 % (ref 0–2)
BASOPHILS NFR BLD AUTO: 1.2 % (ref 0–2)
BILIRUB DIRECT SERPL-MCNC: 0 MG/DL (ref 0–0.2)
BILIRUB SERPL-MCNC: 0.1 MG/DL (ref 0.2–1)
BUN SERPL-MCNC: 29 MG/DL (ref 7–18)
BUN SERPL-MCNC: 30 MG/DL (ref 7–18)
CALCIUM SERPL-MCNC: 8.6 MG/DL (ref 8.5–10.1)
CALCIUM SERPL-MCNC: 8.9 MG/DL (ref 8.5–10.1)
CHLORIDE SERPL-SCNC: 100 MMOL/L (ref 98–107)
CHLORIDE SERPL-SCNC: 101 MMOL/L (ref 98–107)
CHOLEST SERPL-MCNC: 194 MG/DL (ref ?–200)
CO2 SERPL-SCNC: 23 MMOL/L (ref 21–32)
CO2 SERPL-SCNC: 26 MMOL/L (ref 21–32)
CREAT SERPL-MCNC: 2.1 MG/DL (ref 0.6–1.3)
CREAT SERPL-MCNC: 2.2 MG/DL (ref 0.6–1.3)
D DIMER PPP FEU-MCNC: 0.81 MG/L(FEU (ref 0.17–0.5)
EOSINOPHIL NFR BLD AUTO: 0.1 % (ref 0–6)
EOSINOPHIL NFR BLD AUTO: 2.2 % (ref 0–6)
GLUCOSE SERPL-MCNC: 254 MG/DL (ref 74–106)
GLUCOSE SERPL-MCNC: 394 MG/DL (ref 74–106)
HCT VFR BLD AUTO: 31 % (ref 33–45)
HCT VFR BLD AUTO: 31 % (ref 33–45)
HDLC SERPL-MCNC: 55 MG/DL (ref 40–60)
HGB BLD-MCNC: 10 G/DL (ref 11.5–14.8)
HGB BLD-MCNC: 9.9 G/DL (ref 11.5–14.8)
LDLC SERPL DIRECT ASSAY-MCNC: 114 MG/DL (ref 0–99)
LYMPHOCYTES NFR BLD AUTO: 0.7 /CMM (ref 0.8–4.8)
LYMPHOCYTES NFR BLD AUTO: 17 % (ref 20–44)
LYMPHOCYTES NFR BLD AUTO: 2 /CMM (ref 0.8–4.8)
LYMPHOCYTES NFR BLD AUTO: 5.3 % (ref 20–44)
MCHC RBC AUTO-ENTMCNC: 32 G/DL (ref 31–36)
MCHC RBC AUTO-ENTMCNC: 32 G/DL (ref 31–36)
MCV RBC AUTO: 90 FL (ref 82–100)
MCV RBC AUTO: 91 FL (ref 82–100)
MONOCYTES NFR BLD AUTO: 0.3 /CMM (ref 0.1–1.3)
MONOCYTES NFR BLD AUTO: 0.9 /CMM (ref 0.1–1.3)
MONOCYTES NFR BLD AUTO: 2.3 % (ref 2–12)
MONOCYTES NFR BLD AUTO: 7.3 % (ref 2–12)
NEUTROPHILS # BLD AUTO: 12.7 /CMM (ref 1.8–8.9)
NEUTROPHILS # BLD AUTO: 8.5 /CMM (ref 1.8–8.9)
NEUTROPHILS NFR BLD AUTO: 72.3 % (ref 43–81)
NEUTROPHILS NFR BLD AUTO: 91.6 % (ref 43–81)
NT-PROBNP SERPL-MCNC: 3697 PG/ML (ref 0–125)
PLATELET # BLD AUTO: 429 /CMM (ref 150–450)
PLATELET # BLD AUTO: 448 /CMM (ref 150–450)
POTASSIUM SERPL-SCNC: 3.9 MMOL/L (ref 3.5–5.1)
POTASSIUM SERPL-SCNC: 4.6 MMOL/L (ref 3.5–5.1)
PROT SERPL-MCNC: 7.4 G/DL (ref 6.4–8.2)
RBC # BLD AUTO: 3.39 MIL/UL (ref 4–5.2)
RBC # BLD AUTO: 3.48 MIL/UL (ref 4–5.2)
SODIUM SERPL-SCNC: 135 MMOL/L (ref 136–145)
SODIUM SERPL-SCNC: 135 MMOL/L (ref 136–145)
TRIGL SERPL-MCNC: 93 MG/DL (ref 30–150)
TSH SERPL DL<=0.005 MIU/L-ACNC: 1.97 UIU/ML (ref 0.36–3.74)
WBC NRBC COR # BLD AUTO: 11.7 K/UL (ref 4.3–11)
WBC NRBC COR # BLD AUTO: 13.9 K/UL (ref 4.3–11)

## 2020-01-28 RX ADMIN — DEXTROSE MONOHYDRATE SCH MLS/HR: 50 INJECTION, SOLUTION INTRAVENOUS at 10:04

## 2020-01-28 RX ADMIN — Medication SCH MG: at 10:26

## 2020-01-28 RX ADMIN — FERROUS SULFATE TAB 325 MG (65 MG ELEMENTAL FE) SCH MG: 325 (65 FE) TAB at 16:09

## 2020-01-28 RX ADMIN — Medication SCH EACH: at 08:56

## 2020-01-28 RX ADMIN — DEXTROSE MONOHYDRATE SCH MLS/HR: 50 INJECTION, SOLUTION INTRAVENOUS at 11:13

## 2020-01-28 RX ADMIN — ASPIRIN 81 MG SCH MG: 81 TABLET ORAL at 10:27

## 2020-01-28 RX ADMIN — GLIMEPIRIDE SCH MG: 4 TABLET ORAL at 16:09

## 2020-01-28 RX ADMIN — PANTOPRAZOLE SODIUM SCH MG: 40 TABLET, DELAYED RELEASE ORAL at 08:54

## 2020-01-28 RX ADMIN — HEPARIN SODIUM SCH UNITS: 5000 INJECTION INTRAVENOUS; SUBCUTANEOUS at 20:15

## 2020-01-28 RX ADMIN — CLOPIDOGREL BISULFATE SCH MG: 75 TABLET, FILM COATED ORAL at 10:28

## 2020-01-28 RX ADMIN — FERROUS SULFATE TAB 325 MG (65 MG ELEMENTAL FE) SCH MG: 325 (65 FE) TAB at 10:28

## 2020-01-28 RX ADMIN — Medication SCH EACH: at 12:17

## 2020-01-28 RX ADMIN — Medication SCH MG: at 16:09

## 2020-01-28 RX ADMIN — FENOFIBRATE SCH MG: 145 TABLET ORAL at 10:26

## 2020-01-28 RX ADMIN — CARVEDILOL SCH MG: 6.25 TABLET, FILM COATED ORAL at 10:29

## 2020-01-28 RX ADMIN — INSULIN HUMAN PRN UNIT: 100 INJECTION, SOLUTION PARENTERAL at 09:00

## 2020-01-28 RX ADMIN — Medication SCH EACH: at 21:45

## 2020-01-28 RX ADMIN — GABAPENTIN SCH MG: 100 CAPSULE ORAL at 10:28

## 2020-01-28 RX ADMIN — INSULIN HUMAN PRN UNIT: 100 INJECTION, SOLUTION PARENTERAL at 12:16

## 2020-01-28 RX ADMIN — GABAPENTIN SCH MG: 100 CAPSULE ORAL at 12:15

## 2020-01-28 RX ADMIN — INSULIN HUMAN PRN UNIT: 100 INJECTION, SOLUTION PARENTERAL at 17:46

## 2020-01-28 RX ADMIN — CARVEDILOL SCH MG: 6.25 TABLET, FILM COATED ORAL at 20:18

## 2020-01-28 RX ADMIN — GLIMEPIRIDE SCH MG: 4 TABLET ORAL at 09:00

## 2020-01-28 RX ADMIN — FUROSEMIDE SCH MG: 10 INJECTION, SOLUTION INTRAMUSCULAR; INTRAVENOUS at 09:01

## 2020-01-28 RX ADMIN — GABAPENTIN SCH MG: 100 CAPSULE ORAL at 16:09

## 2020-01-28 RX ADMIN — HEPARIN SODIUM SCH UNITS: 5000 INJECTION INTRAVENOUS; SUBCUTANEOUS at 10:25

## 2020-01-28 RX ADMIN — LINAGLIPTIN SCH MG: 5 TABLET, FILM COATED ORAL at 10:28

## 2020-01-28 RX ADMIN — Medication SCH EACH: at 17:34

## 2020-01-28 RX ADMIN — MONTELUKAST SODIUM SCH MG: 10 TABLET, FILM COATED ORAL at 10:26

## 2020-01-28 RX ADMIN — AMLODIPINE BESYLATE SCH MG: 5 TABLET ORAL at 10:27

## 2020-01-28 NOTE — NUR
TELE RN NOTE 

 RESTING COMFORTABLY IN BED , ALL NEEDS ATTENDED ,ABLE TO GO TO BR .URINATED WELL KEEP CLEAN 
DRY , CALL LIGHT WITHIN REACH

## 2020-01-28 NOTE — NUR
RN OPENING NOTES:



PATIENT IN BED, AWAKE, AND VERBALLY RESPONSIVE. NO SOB. ON O2 AT 6LPM VIA NC, TOLERATING 
WELL, O2 %. NO C/O PAIN AT THIS TIME. SAFETY PRECAUTIONS IMPLEMENTED. BED LOCKED, 
ALARM ON, AND IN LOWEST POSITION. (L) HAND G20 INTACT, PATENT, AND FLUSHING WELL. CALL LIGHT 
PLACED WITHIN REACH. WILL CONT. TO MONITOR.

## 2020-01-28 NOTE — NUR
PT BIB RA WITH A C/O SOB. PT IS ALSO C/O LT SIDED SHARP CHEST PAIN. PT WAS 
TRIAGED AND WENT TO ER 2. PT WAS PLACED ON THE MONITOR AND CONTINUOUS PULSE OX. 
PT ARRIVED WITH A BREATHING TX IN PROCESS. APPROX 3 MINS LEFT ON THE BREATHING 
TX.

## 2020-01-28 NOTE — NUR
RN NOTE:



PATIENT C/O NONRADIATING 5/10 CHEST PAIN. CALLED DR. ANGEL AND RECOMMENDED IF NITRO OR 
MORPHINE CAN BE GIVEN. PER MD, JUST GIVE NORCO. WILL CONT. TO MONITOR.

## 2020-01-28 NOTE — NUR
TELE RN NOTE

PT AWAKE AT THIS TIME ABLE TO % OF DINNER, ON O2 NC 6 LITERS. NOT IN DISTRESS AT THIS 
TIME. SAFETY MEASURES OBSERVED. ABLE TO AMBULATE BY SELF. CALL LIGHT WITHIN REACH. WILL 
CONTINUE TO MONITOR.

## 2020-01-28 NOTE — NUR
SHILPA RN NOTE 

DR ANGEL AT BEDSIDE AND AWARE OF CHEST XRAY RESULTS AND PATIENT CONDITION. WILL F/U

## 2020-01-28 NOTE — NUR
SHILPA RN NOTE

 PATIENT IN BED SLEEPING BUT EASILY AROUSABLE, ON 6 L NC  NO SOB AT THIS TIME, ON TELE 
MONITOR, HR 89 ,  LT HAND HL INTACT AND FLUSHED WELL , BED  IN LOWEST AND LOCKED POSITION. 
PLAN OF CARE DISCUSSED WITH PATIENT, WILL CONT TO MONITOR

## 2020-01-29 VITALS — SYSTOLIC BLOOD PRESSURE: 171 MMHG | DIASTOLIC BLOOD PRESSURE: 75 MMHG

## 2020-01-29 VITALS — SYSTOLIC BLOOD PRESSURE: 157 MMHG | DIASTOLIC BLOOD PRESSURE: 54 MMHG

## 2020-01-29 VITALS — DIASTOLIC BLOOD PRESSURE: 76 MMHG | SYSTOLIC BLOOD PRESSURE: 178 MMHG

## 2020-01-29 VITALS — SYSTOLIC BLOOD PRESSURE: 160 MMHG | DIASTOLIC BLOOD PRESSURE: 74 MMHG

## 2020-01-29 LAB
BASOPHILS # BLD AUTO: 0.1 /CMM (ref 0–0.2)
BASOPHILS NFR BLD AUTO: 0.9 % (ref 0–2)
BUN SERPL-MCNC: 36 MG/DL (ref 7–18)
CALCIUM SERPL-MCNC: 9.1 MG/DL (ref 8.5–10.1)
CHLORIDE SERPL-SCNC: 102 MMOL/L (ref 98–107)
CO2 SERPL-SCNC: 26 MMOL/L (ref 21–32)
CREAT SERPL-MCNC: 2.3 MG/DL (ref 0.6–1.3)
EOSINOPHIL NFR BLD AUTO: 2.9 % (ref 0–6)
GLUCOSE SERPL-MCNC: 167 MG/DL (ref 74–106)
HCT VFR BLD AUTO: 31 % (ref 33–45)
HGB BLD-MCNC: 9.8 G/DL (ref 11.5–14.8)
LYMPHOCYTES NFR BLD AUTO: 1.8 /CMM (ref 0.8–4.8)
LYMPHOCYTES NFR BLD AUTO: 16 % (ref 20–44)
MCHC RBC AUTO-ENTMCNC: 32 G/DL (ref 31–36)
MCV RBC AUTO: 90 FL (ref 82–100)
MONOCYTES NFR BLD AUTO: 0.9 /CMM (ref 0.1–1.3)
MONOCYTES NFR BLD AUTO: 7.8 % (ref 2–12)
NEUTROPHILS # BLD AUTO: 8.2 /CMM (ref 1.8–8.9)
NEUTROPHILS NFR BLD AUTO: 72.4 % (ref 43–81)
PLATELET # BLD AUTO: 455 /CMM (ref 150–450)
POTASSIUM SERPL-SCNC: 3.7 MMOL/L (ref 3.5–5.1)
RBC # BLD AUTO: 3.37 MIL/UL (ref 4–5.2)
SODIUM SERPL-SCNC: 137 MMOL/L (ref 136–145)
WBC NRBC COR # BLD AUTO: 11.3 K/UL (ref 4.3–11)

## 2020-01-29 RX ADMIN — DEXTROSE MONOHYDRATE SCH MLS/HR: 50 INJECTION, SOLUTION INTRAVENOUS at 08:45

## 2020-01-29 RX ADMIN — Medication SCH MG: at 08:58

## 2020-01-29 RX ADMIN — PANTOPRAZOLE SODIUM SCH MG: 40 TABLET, DELAYED RELEASE ORAL at 08:43

## 2020-01-29 RX ADMIN — FENOFIBRATE SCH MG: 145 TABLET ORAL at 08:58

## 2020-01-29 RX ADMIN — MONTELUKAST SODIUM SCH MG: 10 TABLET, FILM COATED ORAL at 08:57

## 2020-01-29 RX ADMIN — HEPARIN SODIUM SCH UNITS: 5000 INJECTION INTRAVENOUS; SUBCUTANEOUS at 08:46

## 2020-01-29 RX ADMIN — AMLODIPINE BESYLATE SCH MG: 5 TABLET ORAL at 09:00

## 2020-01-29 RX ADMIN — CLOPIDOGREL BISULFATE SCH MG: 75 TABLET, FILM COATED ORAL at 08:56

## 2020-01-29 RX ADMIN — GLIMEPIRIDE SCH MG: 4 TABLET ORAL at 08:57

## 2020-01-29 RX ADMIN — LINAGLIPTIN SCH MG: 5 TABLET, FILM COATED ORAL at 08:57

## 2020-01-29 RX ADMIN — GABAPENTIN SCH MG: 100 CAPSULE ORAL at 12:37

## 2020-01-29 RX ADMIN — GABAPENTIN SCH MG: 100 CAPSULE ORAL at 08:57

## 2020-01-29 RX ADMIN — INSULIN HUMAN PRN UNIT: 100 INJECTION, SOLUTION PARENTERAL at 08:45

## 2020-01-29 RX ADMIN — ASPIRIN 81 MG SCH MG: 81 TABLET ORAL at 08:56

## 2020-01-29 RX ADMIN — Medication SCH EACH: at 11:48

## 2020-01-29 RX ADMIN — Medication SCH EACH: at 07:39

## 2020-01-29 RX ADMIN — FERROUS SULFATE TAB 325 MG (65 MG ELEMENTAL FE) SCH MG: 325 (65 FE) TAB at 08:58

## 2020-01-29 RX ADMIN — CARVEDILOL SCH MG: 6.25 TABLET, FILM COATED ORAL at 08:56

## 2020-01-29 RX ADMIN — FUROSEMIDE SCH MG: 10 INJECTION, SOLUTION INTRAMUSCULAR; INTRAVENOUS at 08:47

## 2020-01-29 RX ADMIN — INSULIN HUMAN PRN UNIT: 100 INJECTION, SOLUTION PARENTERAL at 11:54

## 2020-01-29 RX ADMIN — DEXTROSE MONOHYDRATE SCH MLS/HR: 50 INJECTION, SOLUTION INTRAVENOUS at 10:20

## 2020-01-29 NOTE — NUR
RN TELE DISCHARGE NOTE

MS MAGANA WAS DISCHARGE TO THE FACILITY BY DR. ANGEL, PT IS ON STABLE CONDITION DISCHARGE 
TEACHING VIA EXIT PLAN DONE, HOME MEDICATION INSTRUCTED, INSTRUCTED ALSO ABOUT THE L[place] 
HOME MEDS THAT THE MD PRESCRIBE AND PT VERBALIZED UNDERSTANDING, V/S CHECKED WNL NO SIGN AND 
SYMPTOMS OF SOB, IV CATHETER REMOVED NO BLEEDING NOTED, ALL BELONGINGS WAS GIVEN PT WALK TO 
THE ENTRANCE ACCOMPANIED BY PRIMARY NURSE  IS WAITING

## 2020-01-29 NOTE — NUR
RN TELE NOTES

DR. ANGEL ORDER TO TRIAL PT ON ROOM AIR, TURN OFF THE O2 AND MONITOR PT, PT TOLERATED ROOM 
AIR WITH O2SAT 98%

## 2020-01-29 NOTE — NUR
RN CLOSING NOTES:



PATIENT IN BED, AWAKE, AND VERBALLY RESPONSIVE. NO SOB. NO C/O CHEST PAIN AT THIS TIME. 
SAFETY PRECAUTIONS IMPLEMENTED. BED LOCKED, ALARM ON, AND IN LOWEST POSITION. (L) HAND G20 
INTACT, PATENT, AND FLUSHING WELL. CALL LIGHT PLACED WITHIN REACH. ENDORSED TO AM SHIFT 
NURSE FOR CONTINUITY OF CARE.

## 2020-01-29 NOTE — NUR
RN OPENING  NOTE

 PATIENT IN BED SLEEPING BUT EASILY AROUSABLE, ON 6 L NC  NO SOB AT THIS TIME, NO COMPLAINT 
OF PAIN ,  LT HAND HL INTACT AND FLUSHED WELL , BED  IN LOWEST AND LOCKED POSITION. PLAN OF 
CARE DISCUSSED WITH PATIENT, WILL CONT TO MONITOR

## 2020-09-24 ENCOUNTER — HOSPITAL ENCOUNTER (INPATIENT)
Dept: HOSPITAL 54 - ER | Age: 45
LOS: 4 days | Discharge: HOME | DRG: 720 | End: 2020-09-28
Attending: INTERNAL MEDICINE | Admitting: INTERNAL MEDICINE
Payer: MEDICAID

## 2020-09-24 VITALS — WEIGHT: 259.19 LBS | HEIGHT: 63 IN | BODY MASS INDEX: 45.93 KG/M2

## 2020-09-24 DIAGNOSIS — Z79.02: ICD-10-CM

## 2020-09-24 DIAGNOSIS — D64.9: ICD-10-CM

## 2020-09-24 DIAGNOSIS — N18.4: ICD-10-CM

## 2020-09-24 DIAGNOSIS — I12.9: ICD-10-CM

## 2020-09-24 DIAGNOSIS — Z87.01: ICD-10-CM

## 2020-09-24 DIAGNOSIS — A41.89: Primary | ICD-10-CM

## 2020-09-24 DIAGNOSIS — I25.10: ICD-10-CM

## 2020-09-24 DIAGNOSIS — Z79.899: ICD-10-CM

## 2020-09-24 DIAGNOSIS — E78.00: ICD-10-CM

## 2020-09-24 DIAGNOSIS — Z88.0: ICD-10-CM

## 2020-09-24 DIAGNOSIS — J12.89: ICD-10-CM

## 2020-09-24 DIAGNOSIS — Z79.84: ICD-10-CM

## 2020-09-24 DIAGNOSIS — N17.9: ICD-10-CM

## 2020-09-24 DIAGNOSIS — Z95.1: ICD-10-CM

## 2020-09-24 DIAGNOSIS — J15.9: ICD-10-CM

## 2020-09-24 DIAGNOSIS — E11.22: ICD-10-CM
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
## 2020-09-24 DIAGNOSIS — T36.8X5A: ICD-10-CM

## 2020-09-24 DIAGNOSIS — U07.1: ICD-10-CM

## 2020-09-24 DIAGNOSIS — E78.5: ICD-10-CM

## 2020-09-24 LAB
ALBUMIN SERPL BCP-MCNC: 4.1 G/DL (ref 3.4–5)
ALP SERPL-CCNC: 60 U/L (ref 46–116)
ALT SERPL W P-5'-P-CCNC: 38 U/L (ref 12–78)
AST SERPL W P-5'-P-CCNC: 29 U/L (ref 15–37)
BASOPHILS # BLD AUTO: 0.1 /CMM (ref 0–0.2)
BASOPHILS NFR BLD AUTO: 0.9 % (ref 0–2)
BILIRUB DIRECT SERPL-MCNC: 0.2 MG/DL (ref 0–0.2)
BILIRUB SERPL-MCNC: 0.4 MG/DL (ref 0.2–1)
BUN SERPL-MCNC: 45 MG/DL (ref 7–18)
CALCIUM SERPL-MCNC: 9.3 MG/DL (ref 8.5–10.1)
CHLORIDE SERPL-SCNC: 94 MMOL/L (ref 98–107)
CO2 SERPL-SCNC: 24 MMOL/L (ref 21–32)
CREAT SERPL-MCNC: 3.2 MG/DL (ref 0.6–1.3)
EOSINOPHIL NFR BLD AUTO: 0.1 % (ref 0–6)
GLUCOSE SERPL-MCNC: 174 MG/DL (ref 74–106)
HCT VFR BLD AUTO: 32 % (ref 33–45)
HGB BLD-MCNC: 10.4 G/DL (ref 11.5–14.8)
LYMPHOCYTES NFR BLD AUTO: 0.4 /CMM (ref 0.8–4.8)
LYMPHOCYTES NFR BLD AUTO: 3.7 % (ref 20–44)
MCHC RBC AUTO-ENTMCNC: 32 G/DL (ref 31–36)
MCV RBC AUTO: 87 FL (ref 82–100)
MONOCYTES NFR BLD AUTO: 0.7 /CMM (ref 0.1–1.3)
MONOCYTES NFR BLD AUTO: 6 % (ref 2–12)
NEUTROPHILS # BLD AUTO: 10.5 /CMM (ref 1.8–8.9)
NEUTROPHILS NFR BLD AUTO: 89.3 % (ref 43–81)
PLATELET # BLD AUTO: 410 /CMM (ref 150–450)
POTASSIUM SERPL-SCNC: 4 MMOL/L (ref 3.5–5.1)
PROT SERPL-MCNC: 9.2 G/DL (ref 6.4–8.2)
RBC # BLD AUTO: 3.7 MIL/UL (ref 4–5.2)
SODIUM SERPL-SCNC: 132 MMOL/L (ref 136–145)
WBC NRBC COR # BLD AUTO: 11.8 K/UL (ref 4.3–11)

## 2020-09-24 PROCEDURE — G0378 HOSPITAL OBSERVATION PER HR: HCPCS

## 2020-09-24 PROCEDURE — U0003 INFECTIOUS AGENT DETECTION BY NUCLEIC ACID (DNA OR RNA); SEVERE ACUTE RESPIRATORY SYNDROME CORONAVIRUS 2 (SARS-COV-2) (CORONAVIRUS DISEASE [COVID-19]), AMPLIFIED PROBE TECHNIQUE, MAKING USE OF HIGH THROUGHPUT TECHNOLOGIES AS DESCRIBED BY CMS-2020-01-R: HCPCS

## 2020-09-24 NOTE — NUR
PATIENT IS STILL UNABLE TO URINATE. BED PAN AND URINARY CATHETER OFFERED. 
PATIENT REFUSED. MD IS NOTIFIED.

## 2020-09-24 NOTE — NUR
PATIENT CAME TO ER BIB RA TO BED 8 FROM HOME C/O SOB. PATIENT STATES THAT SHE 
WAS RECENTLY TESTED POSITIVE 5 DAYS AGO FOR THE CORONAVIRUS. PATIENT ARRIVED 
WITH 15L ON NON-REBREATHER. ON ROOM AIR, PATIENT IS 94-95% OXYGEN SATURATION. 
PATIENT IS AAOX4. BREATHING EVENLY AND UNLABORED ON ROOM AIR. CLEAR LUNG SOUNDS 
AUSCULTATED BILATERAL LOWER AND UPPER LOBES. CONNECTED TO THE CARDIAC MONITOR.

## 2020-09-24 NOTE — NUR
PATIENT DOES NOT WISH TO WEAR MASK WHILE IN ISOLATED ROOM. NASAL CANNULA, 
NON-REBREATHER OFFERED, DOES NOT WANT TO USE OXYGEN. PATIENT IS ALERT AND 
ORIENTED x4. MD IS AWARE.

## 2020-09-25 VITALS — DIASTOLIC BLOOD PRESSURE: 81 MMHG | SYSTOLIC BLOOD PRESSURE: 159 MMHG

## 2020-09-25 VITALS — SYSTOLIC BLOOD PRESSURE: 155 MMHG | DIASTOLIC BLOOD PRESSURE: 77 MMHG

## 2020-09-25 VITALS — SYSTOLIC BLOOD PRESSURE: 182 MMHG | DIASTOLIC BLOOD PRESSURE: 74 MMHG

## 2020-09-25 VITALS — SYSTOLIC BLOOD PRESSURE: 170 MMHG | DIASTOLIC BLOOD PRESSURE: 76 MMHG

## 2020-09-25 VITALS — SYSTOLIC BLOOD PRESSURE: 198 MMHG | DIASTOLIC BLOOD PRESSURE: 101 MMHG

## 2020-09-25 VITALS — DIASTOLIC BLOOD PRESSURE: 74 MMHG | SYSTOLIC BLOOD PRESSURE: 182 MMHG

## 2020-09-25 VITALS — SYSTOLIC BLOOD PRESSURE: 154 MMHG | DIASTOLIC BLOOD PRESSURE: 79 MMHG

## 2020-09-25 LAB
ALBUMIN SERPL BCP-MCNC: 3.7 G/DL (ref 3.4–5)
ALP SERPL-CCNC: 54 U/L (ref 46–116)
ALT SERPL W P-5'-P-CCNC: 36 U/L (ref 12–78)
AST SERPL W P-5'-P-CCNC: 34 U/L (ref 15–37)
BASOPHILS # BLD AUTO: 0.1 /CMM (ref 0–0.2)
BASOPHILS NFR BLD AUTO: 0.8 % (ref 0–2)
BILIRUB SERPL-MCNC: 0.3 MG/DL (ref 0.2–1)
BUN SERPL-MCNC: 51 MG/DL (ref 7–18)
CALCIUM SERPL-MCNC: 8.7 MG/DL (ref 8.5–10.1)
CHLORIDE SERPL-SCNC: 94 MMOL/L (ref 98–107)
CO2 SERPL-SCNC: 23 MMOL/L (ref 21–32)
CREAT SERPL-MCNC: 3.6 MG/DL (ref 0.6–1.3)
CRP SERPL-MCNC: 8.5 MG/DL (ref 0–0.9)
EOSINOPHIL NFR BLD AUTO: 0 % (ref 0–6)
FERRITIN SERPL-MCNC: 204 NG/ML (ref 8–388)
GLUCOSE SERPL-MCNC: 250 MG/DL (ref 74–106)
HCT VFR BLD AUTO: 32 % (ref 33–45)
HGB BLD-MCNC: 10 G/DL (ref 11.5–14.8)
LYMPHOCYTES NFR BLD AUTO: 0.4 /CMM (ref 0.8–4.8)
LYMPHOCYTES NFR BLD AUTO: 4.8 % (ref 20–44)
MCHC RBC AUTO-ENTMCNC: 32 G/DL (ref 31–36)
MCV RBC AUTO: 90 FL (ref 82–100)
MONOCYTES NFR BLD AUTO: 0.6 /CMM (ref 0.1–1.3)
MONOCYTES NFR BLD AUTO: 6.8 % (ref 2–12)
NEUTROPHILS # BLD AUTO: 7.1 /CMM (ref 1.8–8.9)
NEUTROPHILS NFR BLD AUTO: 87.6 % (ref 43–81)
PLATELET # BLD AUTO: 384 /CMM (ref 150–450)
POTASSIUM SERPL-SCNC: 4.4 MMOL/L (ref 3.5–5.1)
PROT SERPL-MCNC: 8.7 G/DL (ref 6.4–8.2)
RBC # BLD AUTO: 3.52 MIL/UL (ref 4–5.2)
SODIUM SERPL-SCNC: 131 MMOL/L (ref 136–145)
TSH SERPL DL<=0.005 MIU/L-ACNC: 1.9 UIU/ML (ref 0.36–3.74)
WBC NRBC COR # BLD AUTO: 8.1 K/UL (ref 4.3–11)

## 2020-09-25 RX ADMIN — Medication SCH EACH: at 21:05

## 2020-09-25 RX ADMIN — GABAPENTIN SCH MG: 300 CAPSULE ORAL at 13:21

## 2020-09-25 RX ADMIN — ACETAMINOPHEN PRN MG: 325 TABLET ORAL at 20:55

## 2020-09-25 RX ADMIN — Medication SCH MG: at 03:34

## 2020-09-25 RX ADMIN — DEXAMETHASONE SODIUM PHOSPHATE SCH MG: 10 INJECTION INTRAMUSCULAR; INTRAVENOUS at 09:31

## 2020-09-25 RX ADMIN — Medication SCH EACH: at 06:55

## 2020-09-25 RX ADMIN — METOPROLOL TARTRATE SCH MG: 50 TABLET, FILM COATED ORAL at 20:56

## 2020-09-25 RX ADMIN — Medication SCH EACH: at 11:46

## 2020-09-25 RX ADMIN — GLIMEPIRIDE SCH MG: 4 TABLET ORAL at 16:40

## 2020-09-25 RX ADMIN — Medication SCH MG: at 09:30

## 2020-09-25 RX ADMIN — ACETAMINOPHEN PRN MG: 325 TABLET ORAL at 16:39

## 2020-09-25 RX ADMIN — PANTOPRAZOLE SODIUM SCH MG: 40 TABLET, DELAYED RELEASE ORAL at 03:34

## 2020-09-25 RX ADMIN — HEPARIN SODIUM SCH UNITS: 5000 INJECTION INTRAVENOUS; SUBCUTANEOUS at 03:45

## 2020-09-25 RX ADMIN — METOPROLOL TARTRATE SCH MG: 50 TABLET, FILM COATED ORAL at 11:46

## 2020-09-25 RX ADMIN — INSULIN HUMAN PRN UNIT: 100 INJECTION, SOLUTION PARENTERAL at 11:50

## 2020-09-25 RX ADMIN — HEPARIN SODIUM SCH UNITS: 5000 INJECTION INTRAVENOUS; SUBCUTANEOUS at 15:53

## 2020-09-25 RX ADMIN — GABAPENTIN SCH MG: 300 CAPSULE ORAL at 16:40

## 2020-09-25 RX ADMIN — FERROUS SULFATE TAB 325 MG (65 MG ELEMENTAL FE) SCH MG: 325 (65 FE) TAB at 16:41

## 2020-09-25 RX ADMIN — LEVOFLOXACIN SCH MG: 250 TABLET, FILM COATED ORAL at 11:46

## 2020-09-25 RX ADMIN — HYDROMORPHONE HYDROCHLORIDE PRN MG: 1 INJECTION, SOLUTION INTRAMUSCULAR; INTRAVENOUS; SUBCUTANEOUS at 20:54

## 2020-09-25 RX ADMIN — HYDROMORPHONE HYDROCHLORIDE PRN MG: 1 INJECTION, SOLUTION INTRAMUSCULAR; INTRAVENOUS; SUBCUTANEOUS at 13:55

## 2020-09-25 RX ADMIN — HYDROMORPHONE HYDROCHLORIDE PRN MG: 1 INJECTION, SOLUTION INTRAMUSCULAR; INTRAVENOUS; SUBCUTANEOUS at 03:46

## 2020-09-25 RX ADMIN — HEPARIN SODIUM SCH UNITS: 5000 INJECTION INTRAVENOUS; SUBCUTANEOUS at 20:55

## 2020-09-25 RX ADMIN — Medication SCH EACH: at 16:46

## 2020-09-25 RX ADMIN — INSULIN HUMAN PRN UNIT: 100 INJECTION, SOLUTION PARENTERAL at 06:55

## 2020-09-25 NOTE — NUR
DR ANGEL MADE AWARE OF PT'S REFUSAL TO CT ABD.PELVIS. NO NEW ORDERS AT THIS TIME. WILL 
CONTINUE TO MONITOR

## 2020-09-25 NOTE — NUR
MS2

DURING INITIAL SHIFT ROUNDING, PATIENT WAS IN ROOM ON BED AWAKE, ALERT, ORIENTED, WAS ON THE 
PHONE TALKING TO SOMEBODY, NO C/O PAIN, NO DISTRESS NOTED, CALL LIGHT IN REACH. WILL 
MONITOR.

## 2020-09-25 NOTE — NUR
rn notes/accu check 216:

pt's blood sugar result is 216, pt currently on npo for ct scan of abdomen pelvis today, 
doesnt know specific time, pt unable to eat, per pt she would rather wait until procedure is 
done and will have her breakfast,

## 2020-09-25 NOTE — NUR
PT C/O OF TIGHTNESS AND RADIATING LEFT ABD PAIN OF 10/10. VS WNL. PER PT REQUEST DILAUDID 
1MG IV Q3HR PRN ADMINISTERED AT THIS TIME PER ORDER. WILL REASSESS AND CONTINUE TO MONITOR

## 2020-09-25 NOTE — NUR
RN NOTES:

CONTACTED DR ANGEL [phone]. RELAYED PT C/O ABDL PAIN 10/10, ALSO RELAYED /74, 
INFORMED MD PT LAST BM WAS 2DAYS AGO, AND ABDL PAIN STARTED 2DAYS AGO, UNABLE TO EAT DUE TO 
LOSS OF APPETITE, NO IMAGING DONE IN ER. PER MD TELEPHONE ORDER RECEIVED TO DO THE 
FOLLOWING: CT ABDOMEN AND PELVIS WITHOUT CONTRAST, PROTONIX 40 MG TAB PO DAILY FIRST DOSE 
NOW, DULCOLAX 10 MG POP X 1 DOSE NOW, DILAUDID 1MG IVP Q3HRS PRN FOR SEVER PAIN 8-10, NORCO 
5/325 MG TAB PO Q4HRS PRN FOR MODERATE PAIN 4-7/10, ZOFRAN 4MG IVP Q4HRS PRN FOR N/V, 
PROCARDIA 60 MG XL DAILY TO GIVE FIRST DOSE NOW. ALL ORDERS READ BACK VERIFIED AND CARRIED 
OUT.

## 2020-09-25 NOTE — NUR
RN NOTES:

PER MD, CT ABDOMEN PELVIS WO CONTRAST, PLACED PT ON NPO FOR NOW IN PREPARATION FOR CT SCAN. 
PT AGREE AND UNDERSTAND.

## 2020-09-25 NOTE — NUR
PT TRANSFERRED BY WHEEL CHAIR OUT OF UNIT AT THIS TIME FOR CT ABD/PELVIS. WILL CONTINUE WITH 
PLAN OF CARE

## 2020-09-25 NOTE — NUR
RN CLOSING NOTES



PT RESTING IN BED AT THIS TIME. PT REMAINED STABLE THROUGHOUT SHIFT. ALL CARE, NEEDS, 
MEDICATIONS AND TREATMENT ADMINISTERED AS ANTICIPATED PER ORDER. SAFETY PRECAUTION IN PLACE 
AND MAINTAINED AT ALL TIMES. BED IN LOWEST LOCKED POSITION, HOB ELEVATED,  RAILS UP X 2, 
CALL LIGHT WITHIN REACH. WILL ENDORSE TO NIGHT SHIFT NURSE

## 2020-09-25 NOTE — NUR
PT NOTED WITH TEMP .0 AT THIS TIME. COOLING MEASURES IN PLACE, SHEETS TAKEN OFF, ROOM 
KEPT COOL, ICE PACK ON UNDER ARMPIT. DR ANGEL MADE AWARE. RECEIVED ORDERS FROM DR ANGEL FOR 
TYLENOL 650MG PO Q4HR PRN FOR FEVER. ORDERS CARRIED OUT AND ADMINISTERED AT THIS TIME. WILL 
CONTINUE TO MONITOR

## 2020-09-25 NOTE — NUR
ADMISSION NOTES:

RECEIVED REPORT FROM DIOMEDES ARMAS. PT BROUGHT TO THE UNIT VIA GURNEY. PT ADMITTED FOR COVID 
POSITIVE, SOB X3DAYS, PRODUCTIVE COUGH X 3DAYS, ABDL PAIN X2DAYS, NO BM X 2DAYS, LOSS OF 
APPETITE WITH N/V. PT BEING ADMITTED FOR COVID 19, PNA, UNDER MED SURG ADMIT PER DR ANGEL. PT 
A/O X4 ENGLISH SPEAKING. PT REFUSED OXYGEN, 97% ON RA. PT REFUSED MASK, OXYGEN, EDUCATION 
PROVIDED TO PT AT THIS TIME. PT IV ACCESS PATENT AND FLUSHING WELL, ON HL. SKIN ASSESSMENT 
PERFORMED NO SKIN ISSUE NOTED. [facility] ORIENTED PT TO UNIT POLICY AND HOURLY ROUNDING. 
USE OF CALL LIGHT SYSTEM. DISCUSSED PLAN OF CARE TO PT. AGREE AND UNDERSTAND. VS TAKEN AND 
RECORDED. SAFETY PRECAUTIONS FOR FALL INITIATED, CALL LIGHT IN REACH, WILL CONTINUE 
MONITORING PT.

## 2020-09-25 NOTE — NUR
RN NOTES/CT SCAN CONSENT:

EXPLAINED TO PT REGARDING CT SCAN OF ABDOMEN AND PELVIS, ORDER AS ROUTINE BY MD TO EVALUATE 
ABDL PAIN, EXPLAINED ABOUT RISK AND BENEFITS, PT AGREE FOR PROCEDURE, CONSENT SECURED AND 
SIGNED BY PT HERSELF.

## 2020-09-25 NOTE — NUR
PT TRANSFERRED BY WHEEL CHAIR TO ROOM AT THIS TIME FROM CT ABD/PELVIS. PT AMARJIT, CT TECH PT 
REFUSED TO GO THROUGH WITH CT ABD/PELVIS. PER PT, SHE DOES NOT WANT TO DO CT ABD/PELVIS. PT 
STATES "I FEEL LIKE FAINTING WHEN I GO IN".  PT EDUCATION PROVIDED. PT VERBALIZE 
UNDERSTANDING BUT STILL REFUSED. WILL FOLLOW UP WITH DOCTOR AND CONTINUE TO MONITOR

## 2020-09-25 NOTE — NUR
END OF SHIFT REPORT:

PT REMAINS ON RA, RESPIRATIONS EVEN AND UNLABORED. IV ACCESS REMAINS PATENT ANS FLUSHING 
WELL, ON HL.NO S/S OF IV INFILTRATION NOTED.  PRN DILAUDID ADMINISTERED FOR C/O ABDL PAIN. 
PT ON NPO, FOR CT ABDOMEN AND PELVIS WITHOUT CONTRAST, CONSENT SECURED, ATTACHED TO CHART. 
VS REMAINS STABLE, NEEDS ATTENDED. SAFETY PRECAUTIONS FOR FALL REMAINs ENGAGED, CALL LIGHT 
IN REACH, WILL ENDORSE TO DAY RN FOR CONTINUITY OF CARE.

## 2020-09-25 NOTE — NUR
RN NOTES



RECEIVED PATIENT IN BED RESTING COMFORTABLY IN MODERATE HIGH BACK REST. ON RA, TOLERATING 
WELL, NO SIGNS OF DISTRESS NOTED AT THIS TIMES. IV ACCESS ON RIGHT AC #18, REMAINS PATENT 
AND INTACT, SAFETY MEASURES IN PLACE, BED IN LOWEST LOCKED POSITION WITH SIDE RAILS UP X2. 
CALL LIGHT IN REACH, WILL CONTINUE TO MONITOR.

## 2020-09-25 NOTE — NUR
RN NOTES/PRN ZOFRAN, DILAUDID:

PT REQUESTED TO RECEIVE ZOFRAN PRIOR TO TAKING PILLS/PO MEDICATION, PRN ZOFRAN 4MG IVP 
ADMINISTERED TO PT AT THIS TIME, HEPARIN SQ ADMINISTERED, PROTONIX, DULCOLAX AND NIFEDIPINE 
PO ADMINISTERED TO PT AFTER 15MINUTES. PRN DILAUDID 1MG IVP ADMINISTERED TO PT AT THIS TIME 
FOR C/O OF ABDOMINAL PAIN 10/10. WILL CONTINUE TO MONITOR AND REASSESS PT.

## 2020-09-26 VITALS — SYSTOLIC BLOOD PRESSURE: 165 MMHG | DIASTOLIC BLOOD PRESSURE: 82 MMHG

## 2020-09-26 LAB
CRP SERPL-MCNC: 12.3 MG/DL (ref 0–0.9)
FERRITIN SERPL-MCNC: 534 NG/ML (ref 8–388)

## 2020-09-26 RX ADMIN — HEPARIN SODIUM SCH UNITS: 5000 INJECTION INTRAVENOUS; SUBCUTANEOUS at 08:32

## 2020-09-26 RX ADMIN — GABAPENTIN SCH MG: 300 CAPSULE ORAL at 12:06

## 2020-09-26 RX ADMIN — Medication SCH EACH: at 21:02

## 2020-09-26 RX ADMIN — HYDROMORPHONE HYDROCHLORIDE PRN MG: 1 INJECTION, SOLUTION INTRAMUSCULAR; INTRAVENOUS; SUBCUTANEOUS at 21:40

## 2020-09-26 RX ADMIN — HEPARIN SODIUM SCH UNITS: 5000 INJECTION INTRAVENOUS; SUBCUTANEOUS at 20:45

## 2020-09-26 RX ADMIN — GLIMEPIRIDE SCH MG: 4 TABLET ORAL at 08:31

## 2020-09-26 RX ADMIN — METOPROLOL TARTRATE SCH MG: 50 TABLET, FILM COATED ORAL at 20:44

## 2020-09-26 RX ADMIN — Medication SCH EACH: at 12:05

## 2020-09-26 RX ADMIN — Medication SCH EACH: at 16:54

## 2020-09-26 RX ADMIN — HYDROMORPHONE HYDROCHLORIDE PRN MG: 1 INJECTION, SOLUTION INTRAMUSCULAR; INTRAVENOUS; SUBCUTANEOUS at 17:09

## 2020-09-26 RX ADMIN — INSULIN HUMAN PRN UNIT: 100 INJECTION, SOLUTION PARENTERAL at 21:03

## 2020-09-26 RX ADMIN — DEXTROSE MONOHYDRATE PRN ML: 500 INJECTION PARENTERAL at 06:39

## 2020-09-26 RX ADMIN — Medication SCH MG: at 08:30

## 2020-09-26 RX ADMIN — HYDROMORPHONE HYDROCHLORIDE PRN MG: 1 INJECTION, SOLUTION INTRAMUSCULAR; INTRAVENOUS; SUBCUTANEOUS at 00:36

## 2020-09-26 RX ADMIN — FERROUS SULFATE TAB 325 MG (65 MG ELEMENTAL FE) SCH MG: 325 (65 FE) TAB at 08:28

## 2020-09-26 RX ADMIN — METOPROLOL TARTRATE SCH MG: 50 TABLET, FILM COATED ORAL at 08:30

## 2020-09-26 RX ADMIN — MONTELUKAST SODIUM SCH MG: 10 TABLET, FILM COATED ORAL at 08:28

## 2020-09-26 RX ADMIN — LEVOFLOXACIN SCH MG: 250 TABLET, FILM COATED ORAL at 08:32

## 2020-09-26 RX ADMIN — CLOPIDOGREL BISULFATE SCH MG: 75 TABLET, FILM COATED ORAL at 08:27

## 2020-09-26 RX ADMIN — GABAPENTIN SCH MG: 300 CAPSULE ORAL at 16:31

## 2020-09-26 RX ADMIN — FERROUS SULFATE TAB 325 MG (65 MG ELEMENTAL FE) SCH MG: 325 (65 FE) TAB at 16:31

## 2020-09-26 RX ADMIN — DEXAMETHASONE SODIUM PHOSPHATE SCH MG: 10 INJECTION INTRAMUSCULAR; INTRAVENOUS at 08:30

## 2020-09-26 RX ADMIN — Medication SCH EACH: at 06:50

## 2020-09-26 RX ADMIN — GABAPENTIN SCH MG: 300 CAPSULE ORAL at 08:30

## 2020-09-26 RX ADMIN — ACETAMINOPHEN PRN MG: 325 TABLET ORAL at 05:09

## 2020-09-26 RX ADMIN — ACETAMINOPHEN PRN MG: 325 TABLET ORAL at 20:53

## 2020-09-26 RX ADMIN — PANTOPRAZOLE SODIUM SCH MG: 40 TABLET, DELAYED RELEASE ORAL at 08:28

## 2020-09-26 NOTE — NUR
RN NOTES



PATIENT IN BED RESTING COMFORTABLY IN MODERATE HIGH BACK REST. A/O X4. RA, TOLERATING WELL. 
IV ACCESS ON RAC #18, PATENT AND INTACT. SAFETY PRECAUTION IN PLACE. BED IN LOWEST LOCKED 
POSITION, SIDE RAILS UP X 2, CALL LIGHT WITHIN REACH. WILL ENDORSE TO NIGHT SHIFT NURSE FOR 
FLORENCE.

## 2020-09-26 NOTE — NUR
RN NOTES



PT REFUSED INSULIN DOSE FOR 17:30 BS , PATIENT HAD EPISODE OF HYPOGLYCEMIA IN AM, WILL 
CONTINUE TO MONITOR.

## 2020-09-26 NOTE — NUR
RN NOTES



INFORMED DR. ANGEL REGARDING PATIENT CRITICAL RESULTS, PROCALCITONIN 12.70, MD ORDER 
ZITHROMAX 500 MG IV Q8H, ORDER MADE AND CARRIED OUT. WILL CONTINUE TO MONITOR.

## 2020-09-26 NOTE — NUR
MS RN NOTE:



Patient complains of abdominal pain. Patient describes pain as aching and rates pain 9 on  a 
0-10 numerical scale. Administered PRN Dilaudid per MD order. Will continue to monitor.

## 2020-09-26 NOTE — NUR
MS RN NOTES



PATIENT REFUSING INSULIN COVERAGE DUE TO HYPOGLYCEMIC EPISODE YESTERDAY/EARLIER TODAY. PER 
PATIENT, BLOOD SUGAR 40; PATIENT REFUSING INSULIN COVERAGE AT THIS TIME; PATIENT IS AWARE OF 
RISKS AND BENEFITS BUT REPORTED SHE DOES NOT WANT TO HAVE HYPOGLYCEMIC EPISODE AGAIN; 



PATIENT ALSO REQUESTING PAIN MEDICATION FOR ABDOMINAL PAIN, 8/10; PATIENT EDUCATED UNABLE TO 
ADMINISTER PAIN MEDICATION SINCE TYLENOL WAS JUST ADMINISTERED; PATIENT VERBALIZED 
UNDERSTANDING, WILL CONT TO MONITOR

## 2020-09-26 NOTE — NUR
RN NOTES



RECEIVED PATIENT IN BED RESTING COMFORTABLY IN MODERATE HIGH BACK REST. A/O X4. RA, 
TOLERATING WELL. IV ACCESS ON RAC #18, PATENT AND INTACT. SAFETY PRECAUTION IN PLACE. BED IN 
LOWEST LOCKED POSITION, SIDE RAILS UP X 2, CALL LIGHT WITHIN REACH. WILL CONTINUE TO 
MONITOR.

## 2020-09-26 NOTE — NUR
MS2/RN

ACCU CHECK = 48, RECHECKED= 46, PER PATIENT SHE WAS DIZZY, D50 50 MLS IV WAS GIVEN AS 
ORDERED. WILL RECHECK IN 30 MINUTES.

## 2020-09-26 NOTE — NUR
MS2/RN

TEMP=103.0, TYLENOL 650 MG PO WAS GIVEN AS ORDERED, COOLING MEASURES STARTED. WILL CONTINUE 
TO MONITOR.

## 2020-09-26 NOTE — NUR
MS RN NOTES



PATIENT REQUESTING DILAUDID IV PUSH FOR PAIN MANAGEMENT; PATIENT A/OX4, BREATHING EVEN AND 
UNLABORED; PATIENT ABLE TO MAKE NEEDS KNOWN; PATIENT REPORTED SHE CANNOT WAIT MUCH LONG AS 
HER STOMACH PAIN IS INCREASING; DILAUDID IV PUSH GIVEN PER MD ORDER; WILL CONT TO MONITOR

## 2020-09-26 NOTE — NUR
MS RN OPENING NOTES



RECEIVED PATIENT RESTING IN BED COMFORTABLY; A/OX4; BREATHING EVEN AND UNLABORED; TOLERATING 
ROOM AIR WELL; PATIENT AMBULATORY WITH STEADY GAIT AND ABLE TO MAKE NEEDS KNOWN; R AC # 18 
INTACT AND PATENT; ISOLATION PRECAUTIONS MAINTAINED; PER AM SHIFT, PATIENT HAD EPISODE OF 
HYPOGLYCEMIA, WILL CONT TO MONITOR; SAFETY PRECAUTIONS IMPLEMENTED; BED LOCKED IN LOW 
POSITION; SIDE RAILSX2; CALL LIGHT WITHIN REACH; WILL CONT PLAN OF CARE AND CONT TO MONITOR [see HPI] : see HPI [Shortness Of Breath] : no shortness of breath [Dyspnea on exertion] : not dyspnea during exertion [Chest  Pressure] : no chest pressure [Lower Ext Edema] : lower extremity edema [Chest Pain] : no chest pain [Palpitations] : no palpitations [Leg Claudication] : no intermittent leg claudication [Negative] : Heme/Lymph

## 2020-09-26 NOTE — NUR
MS2/RN

PATIENT IS AWAKE, COMFORTABLE, NO DISTRESS NOTED, CALL LIGHT IN REACH, ALL NEEDS ATTENDED AT 
THIS TIME, WILL CONTINUE TO MONITOR.

## 2020-09-27 VITALS — SYSTOLIC BLOOD PRESSURE: 139 MMHG | DIASTOLIC BLOOD PRESSURE: 96 MMHG

## 2020-09-27 VITALS — SYSTOLIC BLOOD PRESSURE: 161 MMHG | DIASTOLIC BLOOD PRESSURE: 71 MMHG

## 2020-09-27 VITALS — DIASTOLIC BLOOD PRESSURE: 66 MMHG | SYSTOLIC BLOOD PRESSURE: 143 MMHG

## 2020-09-27 LAB
ALBUMIN SERPL BCP-MCNC: 3.4 G/DL (ref 3.4–5)
ALP SERPL-CCNC: 50 U/L (ref 46–116)
ALT SERPL W P-5'-P-CCNC: 38 U/L (ref 12–78)
AST SERPL W P-5'-P-CCNC: 59 U/L (ref 15–37)
BASOPHILS # BLD AUTO: 0 /CMM (ref 0–0.2)
BASOPHILS NFR BLD AUTO: 0.5 % (ref 0–2)
BILIRUB SERPL-MCNC: 0.2 MG/DL (ref 0.2–1)
BUN SERPL-MCNC: 53 MG/DL (ref 7–18)
CALCIUM SERPL-MCNC: 8.7 MG/DL (ref 8.5–10.1)
CHLORIDE SERPL-SCNC: 94 MMOL/L (ref 98–107)
CO2 SERPL-SCNC: 24 MMOL/L (ref 21–32)
CREAT SERPL-MCNC: 2.9 MG/DL (ref 0.6–1.3)
CRP SERPL-MCNC: 11 MG/DL (ref 0–0.9)
EOSINOPHIL NFR BLD AUTO: 0 % (ref 0–6)
GLUCOSE SERPL-MCNC: 237 MG/DL (ref 74–106)
HCT VFR BLD AUTO: 33 % (ref 33–45)
HGB BLD-MCNC: 10.5 G/DL (ref 11.5–14.8)
LYMPHOCYTES NFR BLD AUTO: 0.5 /CMM (ref 0.8–4.8)
LYMPHOCYTES NFR BLD AUTO: 6.4 % (ref 20–44)
MCHC RBC AUTO-ENTMCNC: 32 G/DL (ref 31–36)
MCV RBC AUTO: 87 FL (ref 82–100)
MONOCYTES NFR BLD AUTO: 0.6 /CMM (ref 0.1–1.3)
MONOCYTES NFR BLD AUTO: 6.9 % (ref 2–12)
NEUTROPHILS # BLD AUTO: 7.2 /CMM (ref 1.8–8.9)
NEUTROPHILS NFR BLD AUTO: 86.2 % (ref 43–81)
PLATELET # BLD AUTO: 400 /CMM (ref 150–450)
POTASSIUM SERPL-SCNC: 4.1 MMOL/L (ref 3.5–5.1)
PROT SERPL-MCNC: 8.4 G/DL (ref 6.4–8.2)
RBC # BLD AUTO: 3.81 MIL/UL (ref 4–5.2)
SODIUM SERPL-SCNC: 130 MMOL/L (ref 136–145)
WBC NRBC COR # BLD AUTO: 8.4 K/UL (ref 4.3–11)

## 2020-09-27 RX ADMIN — DEXAMETHASONE SODIUM PHOSPHATE SCH MG: 10 INJECTION INTRAMUSCULAR; INTRAVENOUS at 09:01

## 2020-09-27 RX ADMIN — HYDROMORPHONE HYDROCHLORIDE PRN MG: 1 INJECTION, SOLUTION INTRAMUSCULAR; INTRAVENOUS; SUBCUTANEOUS at 13:20

## 2020-09-27 RX ADMIN — FERROUS SULFATE TAB 325 MG (65 MG ELEMENTAL FE) SCH MG: 325 (65 FE) TAB at 17:01

## 2020-09-27 RX ADMIN — PANTOPRAZOLE SODIUM SCH MG: 40 TABLET, DELAYED RELEASE ORAL at 09:01

## 2020-09-27 RX ADMIN — Medication SCH EACH: at 11:57

## 2020-09-27 RX ADMIN — HYDROMORPHONE HYDROCHLORIDE PRN MG: 1 INJECTION, SOLUTION INTRAMUSCULAR; INTRAVENOUS; SUBCUTANEOUS at 20:16

## 2020-09-27 RX ADMIN — INSULIN GLARGINE SCH UNIT: 100 INJECTION, SOLUTION SUBCUTANEOUS at 11:57

## 2020-09-27 RX ADMIN — MONTELUKAST SODIUM SCH MG: 10 TABLET, FILM COATED ORAL at 09:00

## 2020-09-27 RX ADMIN — ACETAMINOPHEN PRN MG: 325 TABLET ORAL at 20:00

## 2020-09-27 RX ADMIN — GLIMEPIRIDE SCH MG: 4 TABLET ORAL at 09:01

## 2020-09-27 RX ADMIN — INSULIN HUMAN PRN UNIT: 100 INJECTION, SOLUTION PARENTERAL at 06:34

## 2020-09-27 RX ADMIN — Medication SCH MG: at 17:00

## 2020-09-27 RX ADMIN — GABAPENTIN SCH MG: 300 CAPSULE ORAL at 13:18

## 2020-09-27 RX ADMIN — INSULIN HUMAN PRN UNIT: 100 INJECTION, SOLUTION PARENTERAL at 21:45

## 2020-09-27 RX ADMIN — Medication SCH EACH: at 06:34

## 2020-09-27 RX ADMIN — Medication SCH EACH: at 17:48

## 2020-09-27 RX ADMIN — Medication SCH EACH: at 21:47

## 2020-09-27 RX ADMIN — CLOPIDOGREL BISULFATE SCH MG: 75 TABLET, FILM COATED ORAL at 09:01

## 2020-09-27 RX ADMIN — HEPARIN SODIUM SCH UNITS: 5000 INJECTION INTRAVENOUS; SUBCUTANEOUS at 09:02

## 2020-09-27 RX ADMIN — HEPARIN SODIUM SCH UNITS: 5000 INJECTION INTRAVENOUS; SUBCUTANEOUS at 20:17

## 2020-09-27 RX ADMIN — METOPROLOL TARTRATE SCH MG: 50 TABLET, FILM COATED ORAL at 09:01

## 2020-09-27 RX ADMIN — INSULIN HUMAN PRN UNIT: 100 INJECTION, SOLUTION PARENTERAL at 17:04

## 2020-09-27 RX ADMIN — GABAPENTIN SCH MG: 300 CAPSULE ORAL at 09:00

## 2020-09-27 RX ADMIN — Medication SCH MG: at 09:00

## 2020-09-27 RX ADMIN — HYDROMORPHONE HYDROCHLORIDE PRN MG: 1 INJECTION, SOLUTION INTRAMUSCULAR; INTRAVENOUS; SUBCUTANEOUS at 23:27

## 2020-09-27 RX ADMIN — METOPROLOL TARTRATE SCH MG: 50 TABLET, FILM COATED ORAL at 20:15

## 2020-09-27 RX ADMIN — FERROUS SULFATE TAB 325 MG (65 MG ELEMENTAL FE) SCH MG: 325 (65 FE) TAB at 09:01

## 2020-09-27 RX ADMIN — GABAPENTIN SCH MG: 300 CAPSULE ORAL at 17:01

## 2020-09-27 RX ADMIN — LEVOFLOXACIN SCH MG: 250 TABLET, FILM COATED ORAL at 09:01

## 2020-09-27 NOTE — NUR
Convalescent plasma form needs to be sighed by approved doctor. Dr. Hanks was informed 
that he needs to sigh the form.

## 2020-09-27 NOTE — NUR
Patient is resting in room , remains on room air. No distress noted . All needs attended. 
Safety precautions observed and call light within reach. Will endorse to next shift for FLORENCE

## 2020-09-27 NOTE — NUR
MS RN CLOSING NOTES 



PATIENT RESTING IN BED COMFORTABLY; A/OX4, BREATHING EVEN AND UNLABORED; TOLERATING ROOM AIR 
WELL; NO SOB NOTED; PATIENT DENIES PAIN; TEMPERATURE 97.8F; ISOLATION PRECAUTIONS 
IMPLEMENTED; R AC # 18 INTACT AND PATENT; FLUSHING WELL; PATIENT ABLE TO MAKE NEEDS KNOWN; 
ALL NEEDS RENDERED; SAFETY PRECAUTIONS IMPLEMENTED; WILL ENDORSE FLORENCE TO ONCOMING SHIFT

## 2020-09-28 VITALS — DIASTOLIC BLOOD PRESSURE: 75 MMHG | SYSTOLIC BLOOD PRESSURE: 178 MMHG

## 2020-09-28 VITALS — DIASTOLIC BLOOD PRESSURE: 76 MMHG | SYSTOLIC BLOOD PRESSURE: 168 MMHG

## 2020-09-28 LAB
BASOPHILS # BLD AUTO: 0 /CMM (ref 0–0.2)
BASOPHILS NFR BLD AUTO: 0.4 % (ref 0–2)
BUN SERPL-MCNC: 52 MG/DL (ref 7–18)
CALCIUM SERPL-MCNC: 8.6 MG/DL (ref 8.5–10.1)
CHLORIDE SERPL-SCNC: 94 MMOL/L (ref 98–107)
CO2 SERPL-SCNC: 22 MMOL/L (ref 21–32)
CREAT SERPL-MCNC: 2.7 MG/DL (ref 0.6–1.3)
CRP SERPL-MCNC: 7.9 MG/DL (ref 0–0.9)
EOSINOPHIL NFR BLD AUTO: 0.2 % (ref 0–6)
FERRITIN SERPL-MCNC: 1377 NG/ML (ref 8–388)
GLUCOSE SERPL-MCNC: 59 MG/DL (ref 74–106)
HCT VFR BLD AUTO: 31 % (ref 33–45)
HGB BLD-MCNC: 10.1 G/DL (ref 11.5–14.8)
LYMPHOCYTES NFR BLD AUTO: 1.3 /CMM (ref 0.8–4.8)
LYMPHOCYTES NFR BLD AUTO: 17.8 % (ref 20–44)
MCHC RBC AUTO-ENTMCNC: 33 G/DL (ref 31–36)
MCV RBC AUTO: 87 FL (ref 82–100)
MONOCYTES NFR BLD AUTO: 0.6 /CMM (ref 0.1–1.3)
MONOCYTES NFR BLD AUTO: 7.7 % (ref 2–12)
NEUTROPHILS # BLD AUTO: 5.3 /CMM (ref 1.8–8.9)
NEUTROPHILS NFR BLD AUTO: 73.9 % (ref 43–81)
PLATELET # BLD AUTO: 411 /CMM (ref 150–450)
POTASSIUM SERPL-SCNC: 4.1 MMOL/L (ref 3.5–5.1)
RBC # BLD AUTO: 3.55 MIL/UL (ref 4–5.2)
SODIUM SERPL-SCNC: 129 MMOL/L (ref 136–145)
WBC NRBC COR # BLD AUTO: 7.2 K/UL (ref 4.3–11)

## 2020-09-28 RX ADMIN — PANTOPRAZOLE SODIUM SCH MG: 40 TABLET, DELAYED RELEASE ORAL at 09:28

## 2020-09-28 RX ADMIN — CLOPIDOGREL BISULFATE SCH MG: 75 TABLET, FILM COATED ORAL at 09:30

## 2020-09-28 RX ADMIN — METOPROLOL TARTRATE SCH MG: 50 TABLET, FILM COATED ORAL at 09:29

## 2020-09-28 RX ADMIN — ACETAMINOPHEN PRN MG: 325 TABLET ORAL at 16:39

## 2020-09-28 RX ADMIN — DEXAMETHASONE SODIUM PHOSPHATE SCH MG: 10 INJECTION INTRAMUSCULAR; INTRAVENOUS at 09:29

## 2020-09-28 RX ADMIN — LEVOFLOXACIN SCH MG: 250 TABLET, FILM COATED ORAL at 09:41

## 2020-09-28 RX ADMIN — HYDROMORPHONE HYDROCHLORIDE PRN MG: 1 INJECTION, SOLUTION INTRAMUSCULAR; INTRAVENOUS; SUBCUTANEOUS at 03:57

## 2020-09-28 RX ADMIN — ACETAMINOPHEN PRN MG: 325 TABLET ORAL at 04:20

## 2020-09-28 RX ADMIN — GABAPENTIN SCH MG: 300 CAPSULE ORAL at 16:39

## 2020-09-28 RX ADMIN — FERROUS SULFATE TAB 325 MG (65 MG ELEMENTAL FE) SCH MG: 325 (65 FE) TAB at 09:29

## 2020-09-28 RX ADMIN — GABAPENTIN SCH MG: 300 CAPSULE ORAL at 09:30

## 2020-09-28 RX ADMIN — Medication SCH MG: at 09:30

## 2020-09-28 RX ADMIN — DEXTROSE MONOHYDRATE PRN ML: 500 INJECTION PARENTERAL at 06:41

## 2020-09-28 RX ADMIN — INSULIN GLARGINE SCH UNIT: 100 INJECTION, SOLUTION SUBCUTANEOUS at 09:35

## 2020-09-28 RX ADMIN — Medication SCH EACH: at 07:30

## 2020-09-28 RX ADMIN — HEPARIN SODIUM SCH UNITS: 5000 INJECTION INTRAVENOUS; SUBCUTANEOUS at 09:34

## 2020-09-28 RX ADMIN — MONTELUKAST SODIUM SCH MG: 10 TABLET, FILM COATED ORAL at 09:30

## 2020-09-28 RX ADMIN — GLIMEPIRIDE SCH MG: 4 TABLET ORAL at 09:29

## 2020-09-28 RX ADMIN — GABAPENTIN SCH MG: 300 CAPSULE ORAL at 12:09

## 2020-09-28 RX ADMIN — Medication SCH MG: at 09:29

## 2020-09-28 RX ADMIN — Medication SCH EACH: at 12:11

## 2020-09-28 RX ADMIN — FERROUS SULFATE TAB 325 MG (65 MG ELEMENTAL FE) SCH MG: 325 (65 FE) TAB at 16:39

## 2020-09-28 RX ADMIN — Medication SCH MG: at 16:39

## 2020-09-28 NOTE — NUR
MS RN DISCHARGE NOTES

Patient discharged for Home at this time. Patient in stable condition. VS stable with no 
acute distress. Breathing even and unlabored on room air with no respiratory distress. 
Denies pain. No signs and symptoms of pain. Skin intact. Medication reconciliation and 
discharge orders reviewed and explained to Patient. Patient verbalized understanding. All 
belongings with Patient. Patient will follow up with PCP within 1 week. Escorted Patient to 
the Lobby for safety. Patient picked up by the .

## 2020-09-28 NOTE — NUR
MS RN CLOSING NOTES: PATIENT IS RESTING IN BED, AWAKE, A/O X4. NO S/S OF DISTRESS NOTED. 
CALL LIGHT WITHIN REACH. BED IN LOWEST AND LOCKED POSITION. AMBULATORY.

## 2020-09-28 NOTE — NUR
MS RN OPENING NOTES

Received Patient resting in bed. A/O x 4. VS stable with no acute distress. Breathing even 
and unlabored on room air with no respiratory distress. Denies pain. No signs and symptoms 
of pain. 18g PIV on RAC clean, intact, patent and flushing well. Safety precautions in 
place. Bed locked and set to lowest position with side rails x 2 up. All needs rendered at 
this time. Call light within reach. Will continue to monitor.

## 2021-05-29 ENCOUNTER — HOSPITAL ENCOUNTER (EMERGENCY)
Dept: HOSPITAL 54 - ER | Age: 46
Discharge: HOME | End: 2021-05-29
Payer: MEDICAID

## 2021-05-29 VITALS — BODY MASS INDEX: 42.68 KG/M2 | WEIGHT: 250 LBS | HEIGHT: 64 IN

## 2021-05-29 VITALS — SYSTOLIC BLOOD PRESSURE: 181 MMHG | DIASTOLIC BLOOD PRESSURE: 79 MMHG

## 2021-05-29 DIAGNOSIS — Z79.82: ICD-10-CM

## 2021-05-29 DIAGNOSIS — L02.211: Primary | ICD-10-CM

## 2021-05-29 DIAGNOSIS — I12.0: ICD-10-CM

## 2021-05-29 DIAGNOSIS — N18.6: ICD-10-CM

## 2021-05-29 DIAGNOSIS — Z99.2: ICD-10-CM

## 2021-05-29 DIAGNOSIS — E11.22: ICD-10-CM

## 2021-05-29 DIAGNOSIS — Z98.890: ICD-10-CM

## 2021-05-29 DIAGNOSIS — Z79.4: ICD-10-CM

## 2021-05-29 DIAGNOSIS — Z79.899: ICD-10-CM

## 2021-05-29 DIAGNOSIS — Z88.0: ICD-10-CM

## 2021-05-29 PROCEDURE — 99283 EMERGENCY DEPT VISIT LOW MDM: CPT

## 2021-05-29 PROCEDURE — 10060 I&D ABSCESS SIMPLE/SINGLE: CPT

## 2021-05-29 PROCEDURE — A6407 PACKING STRIPS, NON-IMPREG: HCPCS

## 2021-05-29 RX ADMIN — LIDOCAINE HYDROCHLORIDE ONE MG: 10 INJECTION, SOLUTION INFILTRATION; PERINEURAL at 22:30

## 2021-06-19 ENCOUNTER — HOSPITAL ENCOUNTER (EMERGENCY)
Dept: HOSPITAL 54 - ER | Age: 46
Discharge: HOME | End: 2021-06-19
Payer: MEDICAID

## 2021-06-19 VITALS — DIASTOLIC BLOOD PRESSURE: 64 MMHG | SYSTOLIC BLOOD PRESSURE: 143 MMHG

## 2021-06-19 VITALS — BODY MASS INDEX: 47.84 KG/M2 | HEIGHT: 63 IN | WEIGHT: 270 LBS

## 2021-06-19 DIAGNOSIS — Z79.84: ICD-10-CM

## 2021-06-19 DIAGNOSIS — Z79.899: ICD-10-CM

## 2021-06-19 DIAGNOSIS — Z79.82: ICD-10-CM

## 2021-06-19 DIAGNOSIS — L76.22: ICD-10-CM

## 2021-06-19 DIAGNOSIS — G89.18: Primary | ICD-10-CM

## 2021-06-19 DIAGNOSIS — Z99.2: ICD-10-CM

## 2021-06-19 DIAGNOSIS — Z88.0: ICD-10-CM

## 2021-06-19 DIAGNOSIS — Z98.890: ICD-10-CM

## 2021-06-19 DIAGNOSIS — N18.6: ICD-10-CM

## 2021-06-19 DIAGNOSIS — I12.0: ICD-10-CM

## 2021-06-19 DIAGNOSIS — E11.22: ICD-10-CM

## 2021-06-19 DIAGNOSIS — D63.1: ICD-10-CM

## 2021-07-05 ENCOUNTER — HOSPITAL ENCOUNTER (EMERGENCY)
Dept: HOSPITAL 54 - ER | Age: 46
Discharge: HOME | End: 2021-07-05
Payer: MEDICAID

## 2021-07-05 VITALS — DIASTOLIC BLOOD PRESSURE: 85 MMHG | SYSTOLIC BLOOD PRESSURE: 152 MMHG

## 2021-07-05 VITALS — HEIGHT: 63 IN | WEIGHT: 270 LBS | BODY MASS INDEX: 47.84 KG/M2

## 2021-07-05 DIAGNOSIS — Z79.4: ICD-10-CM

## 2021-07-05 DIAGNOSIS — R60.0: ICD-10-CM

## 2021-07-05 DIAGNOSIS — I12.0: ICD-10-CM

## 2021-07-05 DIAGNOSIS — Z98.890: ICD-10-CM

## 2021-07-05 DIAGNOSIS — Z79.899: ICD-10-CM

## 2021-07-05 DIAGNOSIS — M79.661: Primary | ICD-10-CM

## 2021-07-05 DIAGNOSIS — E11.22: ICD-10-CM

## 2021-07-05 DIAGNOSIS — Z79.82: ICD-10-CM

## 2021-07-05 DIAGNOSIS — N18.6: ICD-10-CM

## 2021-07-05 DIAGNOSIS — R07.89: ICD-10-CM

## 2021-07-05 DIAGNOSIS — Z99.2: ICD-10-CM

## 2021-07-05 DIAGNOSIS — D63.1: ICD-10-CM

## 2021-07-05 DIAGNOSIS — Z88.0: ICD-10-CM

## 2021-07-05 LAB
BASOPHILS # BLD AUTO: 0.1 K/UL (ref 0–0.2)
BASOPHILS NFR BLD AUTO: 1.3 % (ref 0–2)
BUN SERPL-MCNC: 69 MG/DL (ref 7–18)
CALCIUM SERPL-MCNC: 9.3 MG/DL (ref 8.5–10.1)
CHLORIDE SERPL-SCNC: 95 MMOL/L (ref 98–107)
CO2 SERPL-SCNC: 27 MMOL/L (ref 21–32)
CREAT SERPL-MCNC: 4.2 MG/DL (ref 0.6–1.3)
EOSINOPHIL NFR BLD AUTO: 4.1 % (ref 0–6)
GLUCOSE SERPL-MCNC: 195 MG/DL (ref 74–106)
HCT VFR BLD AUTO: 33 % (ref 33–45)
HGB BLD-MCNC: 10.7 G/DL (ref 11.5–14.8)
LYMPHOCYTES NFR BLD AUTO: 18.7 % (ref 20–44)
LYMPHOCYTES NFR BLD AUTO: 2 K/UL (ref 0.8–4.8)
MCHC RBC AUTO-ENTMCNC: 32 G/DL (ref 31–36)
MCV RBC AUTO: 94 FL (ref 82–100)
MONOCYTES NFR BLD AUTO: 1.2 K/UL (ref 0.1–1.3)
MONOCYTES NFR BLD AUTO: 11.2 % (ref 2–12)
NEUTROPHILS # BLD AUTO: 6.9 K/UL (ref 1.8–8.9)
NEUTROPHILS NFR BLD AUTO: 64.7 % (ref 43–81)
PLATELET # BLD AUTO: 371 K/UL (ref 150–450)
POTASSIUM SERPL-SCNC: 4.3 MMOL/L (ref 3.5–5.1)
RBC # BLD AUTO: 3.52 MIL/UL (ref 4–5.2)
SODIUM SERPL-SCNC: 133 MMOL/L (ref 136–145)
WBC NRBC COR # BLD AUTO: 10.7 K/UL (ref 4.3–11)

## 2021-07-05 PROCEDURE — 99285 EMERGENCY DEPT VISIT HI MDM: CPT

## 2021-07-05 PROCEDURE — 85025 COMPLETE CBC W/AUTO DIFF WBC: CPT

## 2021-07-05 PROCEDURE — 96374 THER/PROPH/DIAG INJ IV PUSH: CPT

## 2021-07-05 PROCEDURE — 93970 EXTREMITY STUDY: CPT

## 2021-07-05 PROCEDURE — 84484 ASSAY OF TROPONIN QUANT: CPT

## 2021-07-05 PROCEDURE — 36415 COLL VENOUS BLD VENIPUNCTURE: CPT

## 2021-07-05 PROCEDURE — 80048 BASIC METABOLIC PNL TOTAL CA: CPT

## 2021-07-05 PROCEDURE — 71045 X-RAY EXAM CHEST 1 VIEW: CPT

## 2021-07-05 PROCEDURE — 85378 FIBRIN DEGRADE SEMIQUANT: CPT

## 2021-07-05 PROCEDURE — 93005 ELECTROCARDIOGRAM TRACING: CPT

## 2021-07-05 PROCEDURE — 83880 ASSAY OF NATRIURETIC PEPTIDE: CPT

## 2021-07-05 NOTE — NUR
The patient is bibson, c/o chest sharp pain since this morning, radiating to R 
leg 6/10 pain scale. The patient is alert and oriented x4. Respiration regular 
and unlabored. In room air and denies SOB. Attached to the monitor.

## 2021-08-22 ENCOUNTER — HOSPITAL ENCOUNTER (EMERGENCY)
Dept: HOSPITAL 54 - ER | Age: 46
Discharge: HOME | End: 2021-08-22
Payer: MEDICARE

## 2021-08-22 VITALS — BODY MASS INDEX: 42.38 KG/M2 | HEIGHT: 67 IN | WEIGHT: 270 LBS

## 2021-08-22 VITALS — SYSTOLIC BLOOD PRESSURE: 165 MMHG | DIASTOLIC BLOOD PRESSURE: 83 MMHG

## 2021-08-22 DIAGNOSIS — Z99.2: ICD-10-CM

## 2021-08-22 DIAGNOSIS — D63.1: ICD-10-CM

## 2021-08-22 DIAGNOSIS — Z79.899: ICD-10-CM

## 2021-08-22 DIAGNOSIS — Z95.818: ICD-10-CM

## 2021-08-22 DIAGNOSIS — L98.8: Primary | ICD-10-CM

## 2021-08-22 DIAGNOSIS — Z79.82: ICD-10-CM

## 2021-08-22 DIAGNOSIS — Z88.0: ICD-10-CM

## 2021-08-22 DIAGNOSIS — M79.602: ICD-10-CM

## 2021-08-22 DIAGNOSIS — E11.22: ICD-10-CM

## 2021-08-22 DIAGNOSIS — Z79.4: ICD-10-CM

## 2021-08-22 DIAGNOSIS — I12.0: ICD-10-CM

## 2021-08-22 DIAGNOSIS — N18.6: ICD-10-CM

## 2021-08-22 LAB
ALBUMIN SERPL BCP-MCNC: 4 G/DL (ref 3.4–5)
ALP SERPL-CCNC: 61 U/L (ref 46–116)
ALT SERPL W P-5'-P-CCNC: 18 U/L (ref 12–78)
AST SERPL W P-5'-P-CCNC: 14 U/L (ref 15–37)
BASOPHILS # BLD AUTO: 0.2 K/UL (ref 0–0.2)
BASOPHILS NFR BLD AUTO: 1.1 % (ref 0–2)
BILIRUB SERPL-MCNC: 0.3 MG/DL (ref 0.2–1)
BUN SERPL-MCNC: 65 MG/DL (ref 7–18)
CALCIUM SERPL-MCNC: 9.1 MG/DL (ref 8.5–10.1)
CHLORIDE SERPL-SCNC: 96 MMOL/L (ref 98–107)
CO2 SERPL-SCNC: 23 MMOL/L (ref 21–32)
CREAT SERPL-MCNC: 4.1 MG/DL (ref 0.6–1.3)
EOSINOPHIL NFR BLD AUTO: 4.1 % (ref 0–6)
GLUCOSE SERPL-MCNC: 181 MG/DL (ref 74–106)
HCT VFR BLD AUTO: 31 % (ref 33–45)
HGB BLD-MCNC: 10.4 G/DL (ref 11.5–14.8)
LYMPHOCYTES NFR BLD AUTO: 16.6 % (ref 20–44)
LYMPHOCYTES NFR BLD AUTO: 2.3 K/UL (ref 0.8–4.8)
MCHC RBC AUTO-ENTMCNC: 34 G/DL (ref 31–36)
MCV RBC AUTO: 94 FL (ref 82–100)
MONOCYTES NFR BLD AUTO: 1.2 K/UL (ref 0.1–1.3)
MONOCYTES NFR BLD AUTO: 9 % (ref 2–12)
NEUTROPHILS # BLD AUTO: 9.4 K/UL (ref 1.8–8.9)
NEUTROPHILS NFR BLD AUTO: 69.2 % (ref 43–81)
PLATELET # BLD AUTO: 384 K/UL (ref 150–450)
POTASSIUM SERPL-SCNC: 3.4 MMOL/L (ref 3.5–5.1)
PROT SERPL-MCNC: 8.1 G/DL (ref 6.4–8.2)
RBC # BLD AUTO: 3.32 MIL/UL (ref 4–5.2)
SODIUM SERPL-SCNC: 134 MMOL/L (ref 136–145)
WBC NRBC COR # BLD AUTO: 13.6 K/UL (ref 4.3–11)

## 2021-08-22 NOTE — NUR
Pt bibself c/o left upper arm pain from previous dialysis site. Pt aaox4 
breathing evenly and unlabored. Pt attached to monitor and pox. Upon 
assessment, wound is dry and clean with some pain and redness. Pt given blanket 
and call light within reach

## 2022-06-25 ENCOUNTER — HOSPITAL ENCOUNTER (EMERGENCY)
Dept: HOSPITAL 54 - ER | Age: 47
Discharge: HOME | End: 2022-06-25
Payer: MEDICARE

## 2022-06-25 VITALS — HEIGHT: 63 IN | BODY MASS INDEX: 40.75 KG/M2 | WEIGHT: 230 LBS

## 2022-06-25 VITALS — SYSTOLIC BLOOD PRESSURE: 165 MMHG | DIASTOLIC BLOOD PRESSURE: 92 MMHG

## 2022-06-25 DIAGNOSIS — Z99.2: ICD-10-CM

## 2022-06-25 DIAGNOSIS — E11.22: ICD-10-CM

## 2022-06-25 DIAGNOSIS — M54.6: Primary | ICD-10-CM

## 2022-06-25 DIAGNOSIS — N18.9: ICD-10-CM

## 2022-06-25 DIAGNOSIS — Z88.0: ICD-10-CM

## 2022-06-25 DIAGNOSIS — I12.9: ICD-10-CM

## 2022-06-25 DIAGNOSIS — Z79.899: ICD-10-CM

## 2022-07-13 ENCOUNTER — HOSPITAL ENCOUNTER (INPATIENT)
Dept: HOSPITAL 54 - ER | Age: 47
LOS: 3 days | Discharge: HOME | DRG: 302 | End: 2022-07-16
Attending: INTERNAL MEDICINE | Admitting: NURSE PRACTITIONER
Payer: MEDICARE

## 2022-07-13 VITALS — WEIGHT: 244 LBS | HEIGHT: 65 IN | BODY MASS INDEX: 40.65 KG/M2

## 2022-07-13 DIAGNOSIS — E78.5: ICD-10-CM

## 2022-07-13 DIAGNOSIS — Z82.49: ICD-10-CM

## 2022-07-13 DIAGNOSIS — D68.59: ICD-10-CM

## 2022-07-13 DIAGNOSIS — Z79.02: ICD-10-CM

## 2022-07-13 DIAGNOSIS — E66.01: ICD-10-CM

## 2022-07-13 DIAGNOSIS — N18.6: ICD-10-CM

## 2022-07-13 DIAGNOSIS — Z86.73: ICD-10-CM

## 2022-07-13 DIAGNOSIS — Z79.899: ICD-10-CM

## 2022-07-13 DIAGNOSIS — Z95.1: ICD-10-CM

## 2022-07-13 DIAGNOSIS — Z87.442: ICD-10-CM

## 2022-07-13 DIAGNOSIS — Z20.822: ICD-10-CM

## 2022-07-13 DIAGNOSIS — E11.22: ICD-10-CM

## 2022-07-13 DIAGNOSIS — Z79.82: ICD-10-CM

## 2022-07-13 DIAGNOSIS — Z98.890: ICD-10-CM

## 2022-07-13 DIAGNOSIS — D72.829: ICD-10-CM

## 2022-07-13 DIAGNOSIS — Z79.84: ICD-10-CM

## 2022-07-13 DIAGNOSIS — Z87.891: ICD-10-CM

## 2022-07-13 DIAGNOSIS — Z99.2: ICD-10-CM

## 2022-07-13 DIAGNOSIS — M89.8X9: ICD-10-CM

## 2022-07-13 DIAGNOSIS — Z88.0: ICD-10-CM

## 2022-07-13 DIAGNOSIS — E87.1: ICD-10-CM

## 2022-07-13 DIAGNOSIS — Z79.4: ICD-10-CM

## 2022-07-13 DIAGNOSIS — I13.2: ICD-10-CM

## 2022-07-13 DIAGNOSIS — I50.9: ICD-10-CM

## 2022-07-13 DIAGNOSIS — I25.10: Primary | ICD-10-CM

## 2022-07-13 DIAGNOSIS — G92.9: ICD-10-CM

## 2022-07-13 LAB
BASOPHILS # BLD AUTO: 0 K/UL (ref 0–0.2)
BASOPHILS NFR BLD AUTO: 0.3 % (ref 0–2)
BUN SERPL-MCNC: 78 MG/DL (ref 7–18)
CALCIUM SERPL-MCNC: 9.2 MG/DL (ref 8.5–10.1)
CO2 SERPL-SCNC: 30 MMOL/L (ref 21–32)
CREAT SERPL-MCNC: 3.4 MG/DL (ref 0.6–1.3)
EOSINOPHIL NFR BLD AUTO: 0.1 % (ref 0–6)
GLUCOSE SERPL-MCNC: 360 MG/DL (ref 74–106)
HCT VFR BLD AUTO: 35 % (ref 33–45)
HGB BLD-MCNC: 11.5 G/DL (ref 11.5–14.8)
LYMPHOCYTES NFR BLD AUTO: 0.9 K/UL (ref 0.8–4.8)
LYMPHOCYTES NFR BLD AUTO: 6.7 % (ref 20–44)
MCHC RBC AUTO-ENTMCNC: 33 G/DL (ref 31–36)
MCV RBC AUTO: 91 FL (ref 82–100)
MONOCYTES NFR BLD AUTO: 0.4 K/UL (ref 0.1–1.3)
MONOCYTES NFR BLD AUTO: 2.9 % (ref 2–12)
NEUTROPHILS # BLD AUTO: 12 K/UL (ref 1.8–8.9)
NEUTROPHILS NFR BLD AUTO: 90 % (ref 43–81)
PLATELET # BLD AUTO: 476 K/UL (ref 150–450)
RBC # BLD AUTO: 3.88 MIL/UL (ref 4–5.2)
WBC NRBC COR # BLD AUTO: 13.3 K/UL (ref 4.3–11)

## 2022-07-13 PROCEDURE — G0378 HOSPITAL OBSERVATION PER HR: HCPCS

## 2022-07-13 PROCEDURE — C9803 HOPD COVID-19 SPEC COLLECT: HCPCS

## 2022-07-13 NOTE — NUR
CAME IN FOR CHEST PAIN SINCE 12 NN, VOMITING LAST NIGHT. TO ER BED 7, HOOKED TO 
MONITOR, NSR NOTED. CHANGED TO HOSP GOWN, WARM BLANKET PROVIDED. PATIENT AAO x 
4. BREATHING EVEN AND UNLABORED. AWAITING MD VIVEROS

## 2022-07-14 VITALS — SYSTOLIC BLOOD PRESSURE: 170 MMHG | DIASTOLIC BLOOD PRESSURE: 71 MMHG

## 2022-07-14 VITALS — SYSTOLIC BLOOD PRESSURE: 178 MMHG | DIASTOLIC BLOOD PRESSURE: 71 MMHG

## 2022-07-14 VITALS — DIASTOLIC BLOOD PRESSURE: 72 MMHG | SYSTOLIC BLOOD PRESSURE: 150 MMHG

## 2022-07-14 VITALS — SYSTOLIC BLOOD PRESSURE: 182 MMHG | DIASTOLIC BLOOD PRESSURE: 76 MMHG

## 2022-07-14 VITALS — DIASTOLIC BLOOD PRESSURE: 85 MMHG | SYSTOLIC BLOOD PRESSURE: 170 MMHG

## 2022-07-14 LAB
ALBUMIN SERPL BCP-MCNC: 3.8 G/DL (ref 3.4–5)
ALP SERPL-CCNC: 133 U/L (ref 46–116)
ALT SERPL W P-5'-P-CCNC: 8 U/L (ref 12–78)
AST SERPL W P-5'-P-CCNC: 13 U/L (ref 15–37)
BASOPHILS # BLD AUTO: 0 K/UL (ref 0–0.2)
BASOPHILS NFR BLD AUTO: 0.3 % (ref 0–2)
BILIRUB DIRECT SERPL-MCNC: 0.1 MG/DL (ref 0–0.2)
BILIRUB SERPL-MCNC: 0.3 MG/DL (ref 0.2–1)
BUN SERPL-MCNC: 80 MG/DL (ref 7–18)
CALCIUM SERPL-MCNC: 9.4 MG/DL (ref 8.5–10.1)
CHLORIDE SERPL-SCNC: 90 MMOL/L (ref 98–107)
CHLORIDE SERPL-SCNC: 93 MMOL/L (ref 98–107)
CHOLEST SERPL-MCNC: 204 MG/DL (ref ?–200)
CO2 SERPL-SCNC: 27 MMOL/L (ref 21–32)
CREAT SERPL-MCNC: 3.1 MG/DL (ref 0.6–1.3)
EOSINOPHIL NFR BLD AUTO: 0.4 % (ref 0–6)
GLUCOSE SERPL-MCNC: 273 MG/DL (ref 74–106)
HCT VFR BLD AUTO: 36 % (ref 33–45)
HDLC SERPL-MCNC: 55 MG/DL (ref 40–60)
HGB BLD-MCNC: 11.3 G/DL (ref 11.5–14.8)
LDLC SERPL DIRECT ASSAY-MCNC: 131 MG/DL (ref 0–99)
LYMPHOCYTES NFR BLD AUTO: 1.1 K/UL (ref 0.8–4.8)
LYMPHOCYTES NFR BLD AUTO: 9.3 % (ref 20–44)
MAGNESIUM SERPL-MCNC: 2.9 MG/DL (ref 1.8–2.4)
MCHC RBC AUTO-ENTMCNC: 32 G/DL (ref 31–36)
MCV RBC AUTO: 92 FL (ref 82–100)
MONOCYTES NFR BLD AUTO: 0.6 K/UL (ref 0.1–1.3)
MONOCYTES NFR BLD AUTO: 5.4 % (ref 2–12)
NEUTROPHILS # BLD AUTO: 10.2 K/UL (ref 1.8–8.9)
NEUTROPHILS NFR BLD AUTO: 84.6 % (ref 43–81)
PHOSPHATE SERPL-MCNC: 5.7 MG/DL (ref 2.5–4.9)
PLATELET # BLD AUTO: 477 K/UL (ref 150–450)
POTASSIUM SERPL-SCNC: 3.6 MMOL/L (ref 3.5–5.1)
POTASSIUM SERPL-SCNC: 3.8 MMOL/L (ref 3.5–5.1)
PROT SERPL-MCNC: 8.5 G/DL (ref 6.4–8.2)
RBC # BLD AUTO: 3.85 MIL/UL (ref 4–5.2)
SODIUM SERPL-SCNC: 131 MMOL/L (ref 136–145)
SODIUM SERPL-SCNC: 135 MMOL/L (ref 136–145)
TRIGL SERPL-MCNC: 126 MG/DL (ref 30–150)
WBC NRBC COR # BLD AUTO: 12.1 K/UL (ref 4.3–11)

## 2022-07-14 PROCEDURE — 5A1D70Z PERFORMANCE OF URINARY FILTRATION, INTERMITTENT, LESS THAN 6 HOURS PER DAY: ICD-10-PCS

## 2022-07-14 RX ADMIN — TRAZODONE HYDROCHLORIDE SCH MG: 50 TABLET ORAL at 22:00

## 2022-07-14 RX ADMIN — GLIMEPIRIDE SCH MG: 4 TABLET ORAL at 17:00

## 2022-07-14 RX ADMIN — FERROUS SULFATE TAB 325 MG (65 MG ELEMENTAL FE) SCH MG: 325 (65 FE) TAB at 17:00

## 2022-07-14 RX ADMIN — FUROSEMIDE SCH MG: 40 TABLET ORAL at 09:54

## 2022-07-14 RX ADMIN — MONTELUKAST SODIUM SCH MG: 10 TABLET, FILM COATED ORAL at 09:00

## 2022-07-14 RX ADMIN — Medication SCH MG: at 09:00

## 2022-07-14 RX ADMIN — FUROSEMIDE SCH MG: 40 TABLET ORAL at 17:00

## 2022-07-14 RX ADMIN — BISOPROLOL FUMARATE SCH MG: 5 TABLET ORAL at 09:00

## 2022-07-14 RX ADMIN — DEXTROSE MONOHYDRATE PRN MG: 50 INJECTION, SOLUTION INTRAVENOUS at 04:10

## 2022-07-14 RX ADMIN — GABAPENTIN SCH MG: 100 CAPSULE ORAL at 22:00

## 2022-07-14 RX ADMIN — ATORVASTATIN CALCIUM SCH MG: 10 TABLET, FILM COATED ORAL at 22:00

## 2022-07-14 RX ADMIN — ASPIRIN 81 MG SCH MG: 81 TABLET ORAL at 09:00

## 2022-07-14 RX ADMIN — Medication SCH EACH: at 17:07

## 2022-07-14 RX ADMIN — LORAZEPAM SCH MG: 1 TABLET ORAL at 22:00

## 2022-07-14 RX ADMIN — Medication SCH EACH: at 22:00

## 2022-07-14 RX ADMIN — Medication SCH MG: at 09:02

## 2022-07-14 RX ADMIN — Medication SCH EACH: at 06:46

## 2022-07-14 RX ADMIN — ISOSORBIDE DINITRATE SCH MG: 20 TABLET ORAL at 17:00

## 2022-07-14 RX ADMIN — GLIMEPIRIDE SCH MG: 4 TABLET ORAL at 09:00

## 2022-07-14 RX ADMIN — METOLAZONE SCH MG: 2.5 TABLET ORAL at 17:00

## 2022-07-14 RX ADMIN — AMLODIPINE BESYLATE SCH MG: 10 TABLET ORAL at 09:00

## 2022-07-14 RX ADMIN — FERROUS SULFATE TAB 325 MG (65 MG ELEMENTAL FE) SCH MG: 325 (65 FE) TAB at 09:01

## 2022-07-14 RX ADMIN — CLOPIDOGREL BISULFATE SCH MG: 75 TABLET, FILM COATED ORAL at 09:03

## 2022-07-14 RX ADMIN — INSULIN HUMAN PRN UNITS: 100 INJECTION, SOLUTION PARENTERAL at 06:47

## 2022-07-14 RX ADMIN — DOCUSATE SODIUM SCH MG: 250 CAPSULE, LIQUID FILLED ORAL at 09:00

## 2022-07-14 RX ADMIN — DEXTROSE MONOHYDRATE PRN MG: 50 INJECTION, SOLUTION INTRAVENOUS at 09:25

## 2022-07-14 RX ADMIN — DOCUSATE SODIUM SCH MG: 250 CAPSULE, LIQUID FILLED ORAL at 17:00

## 2022-07-14 RX ADMIN — Medication SCH MG: at 17:00

## 2022-07-14 RX ADMIN — DEXTROSE MONOHYDRATE PRN MG: 50 INJECTION, SOLUTION INTRAVENOUS at 16:13

## 2022-07-14 RX ADMIN — SERTRALINE HYDROCHLORIDE SCH MG: 25 TABLET, FILM COATED ORAL at 09:02

## 2022-07-14 RX ADMIN — Medication SCH EACH: at 12:31

## 2022-07-14 RX ADMIN — PANTOPRAZOLE SODIUM SCH MG: 40 TABLET, DELAYED RELEASE ORAL at 09:01

## 2022-07-14 RX ADMIN — ISOSORBIDE DINITRATE SCH MG: 20 TABLET ORAL at 09:00

## 2022-07-14 NOTE — NUR
TELE RN NOTE



PATIENT UNABLE TOLERATE ORAL MEDICATIONS. DR. CAMPBELL NOTIFIED IF ORAL MEDS CAN BE CHANGED TO 
IV. AWAITING MD TO MAKE ORDERS. ZOFRAN GIVEN AS ORDERED.

## 2022-07-14 NOTE — NUR
ADMISSION 

48 y/o Female, ambulatory. Alert Oriented x4. Skin intact, no pressure injury. [place] to 
room, unit, staff. Patient nauseous and episodes of vomiting upon arriving to unit from ER. 
Left wrist IV peripheral line infiltrated, removed. Patient is hard stick, awaiting MIDLINE 
insertion.

## 2022-07-14 NOTE — NUR
TELE RN NOTE



PATIENT REFUSED ORAL MEDICATIONS. WITH PRESENCE OF  AND SON AT BEDSIDE. EXPLAINED 
IMPORTANCE/ BENEFITS OF MEDICATIONS BUT STILL REFUSED TO TAKE MEDICATION. PATIENT ABLE TO 
TOLERATE 2 SERVINGS OF APPLE SAUCE. NO NAUSEA AND VOMITING AT THIS TIME. THIS NURSE 
REQUESTED DR. CAMPBELL IF HE CAN CHANGE MEDICATIONS FROM ORAL TO IV, AWAITING ORDERS. PATIENT IN 
STABLE CONDITION. PROVIDED WITH CALM AND QUIET ENVIRONMENT.

## 2022-07-14 NOTE — NUR
RN OPENING NOTE



PATIENT IS AWAKE IN BED. A/OX3. NO S/S OF DISTRESS, BREATHING W/O DIFFICULTY ON ROOM AIR. 
LFA #24 SL INTACT AND PATENT. TELE MONITOR REVEALS SR 86. SAFETY MEASURES IN PLACE: BED 
LOCKED AND AT LOWEST POSITION, RAILS UP X2, CALL BELL WITHIN REACH. WILL CONTINUE TO MONITOR 
PATIENT.

## 2022-07-14 NOTE — NUR
MS RN CLOSING NOTE



PATIENT IN BED; INTERMITENTLY SLEEPING, ALERT AND ORIENTED X3-4; PT. ON ROOM AIR, WITH EQUAL 
AND UNLABORED BREATHING, WITH NO SIGNS OF RESPIRATORY DISTRESS NOTED, SATURATING AT 97-96%. 
PT. WITH IV ACCESS ON LEFT FORE ARM ON SALINE LOCK, PATENT AND INTACT. WITH RIGHT UPPER ARM 
AV FISTULA THAT IS FUNCTIONING WITH BRUIT AND THRILL, AND LEFT UPPER ARM AV FISTULA THAT IS 
NON FUNCTIONAL. PATIENT REFUSED ORAL MEDICATIONS AND SOME DIAGNOSTIC PROCEDURES LIKE THE CT 
SCAN, AND ECHO CARDIOGRAM. MD AWARE. SAFETY MEASURES IN PLACE WITH CALL LIGHT WITHIN EASY 
REACH, SIDE RAILS UP X2, BED IN LOWEST LOCKED POSITION. ENDORSED TO NEXT SHIFT FOR 
CONTINUITY OF CARE.

## 2022-07-14 NOTE — NUR
MS RN CLOSING NOTE



PATIENT IN BED; AWAKE, ALERT AND ORIENTED X4; APPEARS REFUSED TO BE DISTURED . PT. ON ROOM 
AIR, WITH EQUAL AND UNLABORED BREATHING, WITH NO SIGNS OF RESPIRATORY DISTRESS NOTED, 
SATURATING AT 97-96%. PT. WITH IV ACCESS ON LEFT FORE ARM ON SALINE LOCK, PATENT AND INTACT. 
WITH RIGHT UPPER ARM AV FISTULA THAT IS FUNCTIONING WITH BRUIT AND THRILL, AND LEFT UPPER 
ARM AV FISTULA THAT IS NON FUNCTIONAL. SAFETY MEASURES IN PLACE WITH CALL LIGHT WITHIN EASY 
REACH, SIDE RAILS UP X2, BED IN LOWEST LOCKED POSITION. WILL CONTINUE TO MONITOR PATIENT.

## 2022-07-14 NOTE — NUR
TELE RN NOTE



PATIENT REFUSED VS CHECK AT THIS TIME. IN STABLE CONDITION. WILL CONTINUE TO MONITOR 
PATIENT.

## 2022-07-14 NOTE — NUR
END OF SHIFT REPORT

Patient is Alert Oriented x4. Sinus rhythm/ Sinus Lucio in the Tele monitor HR 59. On room 
air, tolerated well. N/V improved with Zofran IV. Blood glucose with insulin SS parameters. 
Awaiting midline insertion, Urine to collect for test, Cardio consult, med recon for 
complete review, will endorse to oncoming RN.

## 2022-07-15 VITALS — DIASTOLIC BLOOD PRESSURE: 80 MMHG | SYSTOLIC BLOOD PRESSURE: 159 MMHG

## 2022-07-15 VITALS — DIASTOLIC BLOOD PRESSURE: 78 MMHG | SYSTOLIC BLOOD PRESSURE: 152 MMHG

## 2022-07-15 VITALS — SYSTOLIC BLOOD PRESSURE: 177 MMHG | DIASTOLIC BLOOD PRESSURE: 85 MMHG

## 2022-07-15 VITALS — SYSTOLIC BLOOD PRESSURE: 143 MMHG | DIASTOLIC BLOOD PRESSURE: 90 MMHG

## 2022-07-15 LAB
ALBUMIN SERPL BCP-MCNC: 4 G/DL (ref 3.4–5)
ALP SERPL-CCNC: 134 U/L (ref 46–116)
ALT SERPL W P-5'-P-CCNC: 11 U/L (ref 12–78)
AST SERPL W P-5'-P-CCNC: 18 U/L (ref 15–37)
BASOPHILS # BLD AUTO: 0 K/UL (ref 0–0.2)
BASOPHILS NFR BLD AUTO: 0.3 % (ref 0–2)
BILIRUB SERPL-MCNC: 0.1 MG/DL (ref 0.2–1)
BILIRUB UR QL STRIP: NEGATIVE
BUN SERPL-MCNC: 79 MG/DL (ref 7–18)
CALCIUM SERPL-MCNC: 9.3 MG/DL (ref 8.5–10.1)
CHLORIDE SERPL-SCNC: 92 MMOL/L (ref 98–107)
CO2 SERPL-SCNC: 23 MMOL/L (ref 21–32)
COLOR UR: YELLOW
CREAT SERPL-MCNC: 2.8 MG/DL (ref 0.6–1.3)
EOSINOPHIL NFR BLD AUTO: 0.4 % (ref 0–6)
GLUCOSE SERPL-MCNC: 288 MG/DL (ref 74–106)
GLUCOSE UR STRIP-MCNC: >=1000 MG/DL
HCT VFR BLD AUTO: 33 % (ref 33–45)
HGB BLD-MCNC: 10.9 G/DL (ref 11.5–14.8)
LEUKOCYTE ESTERASE UR QL STRIP: (no result)
LYMPHOCYTES NFR BLD AUTO: 1.1 K/UL (ref 0.8–4.8)
LYMPHOCYTES NFR BLD AUTO: 7.5 % (ref 20–44)
MAGNESIUM SERPL-MCNC: 3 MG/DL (ref 1.8–2.4)
MCHC RBC AUTO-ENTMCNC: 33 G/DL (ref 31–36)
MCV RBC AUTO: 91 FL (ref 82–100)
MONOCYTES NFR BLD AUTO: 1.1 K/UL (ref 0.1–1.3)
MONOCYTES NFR BLD AUTO: 7.7 % (ref 2–12)
NEUTROPHILS # BLD AUTO: 12.3 K/UL (ref 1.8–8.9)
NEUTROPHILS NFR BLD AUTO: 84.1 % (ref 43–81)
NITRITE UR QL STRIP: NEGATIVE
PH UR STRIP: 6 [PH] (ref 5–8)
PHOSPHATE SERPL-MCNC: 4 MG/DL (ref 2.5–4.9)
PLATELET # BLD AUTO: 515 K/UL (ref 150–450)
POTASSIUM SERPL-SCNC: 3.4 MMOL/L (ref 3.5–5.1)
PROT SERPL-MCNC: 8.8 G/DL (ref 6.4–8.2)
PROT UR QL STRIP: NEGATIVE MG/DL
RBC # BLD AUTO: 3.66 MIL/UL (ref 4–5.2)
RBC #/AREA URNS HPF: (no result) /HPF (ref 0–2)
SODIUM SERPL-SCNC: 134 MMOL/L (ref 136–145)
UROBILINOGEN UR STRIP-MCNC: 0.2 EU/DL
WBC NRBC COR # BLD AUTO: 14.6 K/UL (ref 4.3–11)

## 2022-07-15 PROCEDURE — 05HC33Z INSERTION OF INFUSION DEVICE INTO LEFT BASILIC VEIN, PERCUTANEOUS APPROACH: ICD-10-PCS | Performed by: NURSE PRACTITIONER

## 2022-07-15 RX ADMIN — AMLODIPINE BESYLATE SCH MG: 10 TABLET ORAL at 08:27

## 2022-07-15 RX ADMIN — INSULIN HUMAN PRN UNITS: 100 INJECTION, SOLUTION PARENTERAL at 17:10

## 2022-07-15 RX ADMIN — GLIMEPIRIDE SCH MG: 4 TABLET ORAL at 17:00

## 2022-07-15 RX ADMIN — METOLAZONE SCH MG: 2.5 TABLET ORAL at 08:55

## 2022-07-15 RX ADMIN — ISOSORBIDE DINITRATE SCH MG: 20 TABLET ORAL at 17:00

## 2022-07-15 RX ADMIN — ESCITALOPRAM OXALATE SCH MG: 10 TABLET, FILM COATED ORAL at 08:54

## 2022-07-15 RX ADMIN — FERROUS SULFATE TAB 325 MG (65 MG ELEMENTAL FE) SCH MG: 325 (65 FE) TAB at 08:54

## 2022-07-15 RX ADMIN — FERROUS SULFATE TAB 325 MG (65 MG ELEMENTAL FE) SCH MG: 325 (65 FE) TAB at 17:00

## 2022-07-15 RX ADMIN — MONTELUKAST SODIUM SCH MG: 10 TABLET, FILM COATED ORAL at 08:55

## 2022-07-15 RX ADMIN — FUROSEMIDE SCH MG: 40 TABLET ORAL at 08:54

## 2022-07-15 RX ADMIN — METOLAZONE SCH MG: 2.5 TABLET ORAL at 17:00

## 2022-07-15 RX ADMIN — Medication SCH MG: at 08:54

## 2022-07-15 RX ADMIN — Medication SCH MG: at 17:10

## 2022-07-15 RX ADMIN — ATORVASTATIN CALCIUM SCH MG: 10 TABLET, FILM COATED ORAL at 21:30

## 2022-07-15 RX ADMIN — Medication SCH MG: at 17:00

## 2022-07-15 RX ADMIN — LORAZEPAM SCH MG: 1 TABLET ORAL at 21:30

## 2022-07-15 RX ADMIN — Medication SCH EACH: at 11:58

## 2022-07-15 RX ADMIN — ASPIRIN 81 MG SCH MG: 81 TABLET ORAL at 08:53

## 2022-07-15 RX ADMIN — GABAPENTIN SCH MG: 100 CAPSULE ORAL at 21:30

## 2022-07-15 RX ADMIN — FUROSEMIDE SCH MG: 40 TABLET ORAL at 17:00

## 2022-07-15 RX ADMIN — Medication SCH EACH: at 17:08

## 2022-07-15 RX ADMIN — LOSARTAN POTASSIUM SCH MG: 50 TABLET, FILM COATED ORAL at 08:26

## 2022-07-15 RX ADMIN — SERTRALINE HYDROCHLORIDE SCH MG: 25 TABLET, FILM COATED ORAL at 08:55

## 2022-07-15 RX ADMIN — TRAZODONE HYDROCHLORIDE SCH MG: 50 TABLET ORAL at 21:30

## 2022-07-15 RX ADMIN — DOCUSATE SODIUM SCH MG: 250 CAPSULE, LIQUID FILLED ORAL at 17:00

## 2022-07-15 RX ADMIN — ACETAMINOPHEN PRN MG: 325 TABLET ORAL at 22:33

## 2022-07-15 RX ADMIN — BISOPROLOL FUMARATE SCH MG: 5 TABLET ORAL at 08:55

## 2022-07-15 RX ADMIN — PANTOPRAZOLE SODIUM SCH MG: 40 TABLET, DELAYED RELEASE ORAL at 07:30

## 2022-07-15 RX ADMIN — DOCUSATE SODIUM SCH MG: 250 CAPSULE, LIQUID FILLED ORAL at 08:53

## 2022-07-15 RX ADMIN — CLOPIDOGREL BISULFATE SCH MG: 75 TABLET, FILM COATED ORAL at 08:54

## 2022-07-15 RX ADMIN — GLIMEPIRIDE SCH MG: 4 TABLET ORAL at 08:53

## 2022-07-15 RX ADMIN — Medication SCH EACH: at 21:30

## 2022-07-15 RX ADMIN — Medication SCH EACH: at 06:36

## 2022-07-15 RX ADMIN — ISOSORBIDE DINITRATE SCH MG: 20 TABLET ORAL at 08:26

## 2022-07-15 NOTE — NUR
RN NOTES



PATIENT ONLY TOOK HYDRALAZINE, COZAAR, ISORDIL AND NORVASC TABLETS BEFORE REFUSING THE REST 
OF HER SCHEDULED 0900 MEDICATION. RISK AND BENEFITS EXPLAINED. WILL CONTINUE TO MONITOR.

## 2022-07-15 NOTE — NUR
TELE RN OPENING NOTES



RECEIVED PATIENT SLEEPING IN BED, NO S/S OF PAIN OR DISCOMFORT NOTED. A/O x3. ON ROOM AIR, 
NO S/S OF SOB OR RESPIRATORY DISTRESS. ON TELE MONITORING SINUS RHYTHM WITH BORDERLINE 1ST 
DEGREE HR 71 AT THIS TIME. NO S/S OF CARDIAC DISTRESS. PATIENT IS CONTINENT AND USES BED 
PAN. ON BEDREST, CCHO DIET. PERFECTO AV FISTULA, DRESSING INTACT AND NO S/S OF BLEEDING NOTED. 
EBER MIDLINE #18SL AND L FA #24 SL, BOTH INTACT AND PATENT AT THIS TIME. NO S/S OF BLEEDING 
OR INFILTRATION. SKIN IS INTACT. SAFETY MEASURES IN PLACE: BED LOCKED AN AT LOWEST POSITION, 
RAILS UP x2, CALL LIGHT WITHIN REACH. WILL CONTINUE TO MONITOR.

## 2022-07-15 NOTE — NUR
MS RN OPENING NOTES



PATIENT RECEIVED RESTING IN BED COMFORTABLY; A/OX3, ABLE TO MAKE NEEDS KNOWN; PER AM SHIFT, 
PATIENT HAS BEEN NON-COMPLIANT WITH MEDICATIONS, ACCU-CHECKS AND TREATMENT PLAN; ALSO 
REFUSING HEMODIALYSIS AS WELL; CHARGE NURSES AND MDS ARE AWARE OF PATIENT'S NON-COMPLIANCE 
THROUGHOUT HOSPITALIZATION; PERFECTO FISTULA PRESENT, EBER MIDLINE #18 S/L; L FA #24 S/F INTACT 
AND PATENT, FLUSHING WELL; NO S/S OF REDNESS OR INFILTRATION NOTED ON IV SITES; SAFETY 
PRECAUTIONS IMPLEMENTED; BED LOCKED IN LOW POSITION; SIDE RAILSX2; CALL LIGHT WITHIN REACH; 
WILL CONT PLAN OF CARE

## 2022-07-15 NOTE — NUR
RN NOTES



RECEIVED CALL FROM LAB, TROPONIN LEVEL HIGH 52. DR. ESPOSITO NOTIFIED, ACKNOWLEDGED AND NO NEW 
ORDERS AT THIS TIME.

## 2022-07-15 NOTE — NUR
MS RN NOTES



SPOKE WITH HEMODIALYSIS NURSE, MARLEE, CHANDA; PER MARLEE, SINCE PATIENT HAS BEEN REFUSING HD, 
RESCHEDULE IN AM; DR. MORROW ALSO AWARE OF PATIENT'S REFUSAL; CHARGE NURSE MADE AWARE; 
PATIENT HAS ALSO BEEN REFUSING MEDICATION THROUGHOUT HOSPITALIZATION; PATIENT DOES NOT WANT 
TO TAKE HER SCHEDULED MEDS OR UNDERGO HEMODIALYSIS;

## 2022-07-15 NOTE — NUR
MS RN NOTES



PATIENT STILL ACCUSING STAFF OF LYING; PATIENT ALSO CALLED HER PRIMARY DOCTOR, MENTIONED TO 
HIS OFFICE TO NOT HANG UP BECAUSE THE NURSE WOULD LIKE TO SPEAK WITH HIM; PATIENT WANTED ME 
TO STAY IN THE ROOM SO SHE CAN ASK HER DOCTOR TO BE TRANSFERRED TO HIS HOSPITAL; NURSE NEVER 
REQUESTED TO SPEAK WITH HER PRIMARY DOCTOR; 



WENT INTO PATIENT ROOM WITH CNA AS WITNESS, PATIENT STILL DENYING SHE REFUSED TREATMENT 
SINCE STAYING HERE; PATIENT ALSO REPORTED SHE WAS NEVER SEEN BY A DOCTOR SINCE SHE WAS 
ADMITTED; CHARGE NURSE MADE AWARE; PER CHARGE AND UNIT SECRETARY, PATIENT HAS BEEN REFUSING 
TREATMENT AND HAS BEEN TELLING HER FAMILY, CALLING HER FAMILY TO CALL FOR HELP. PATIENT'S 
FAMILY IS GETTING UPSET, PATIENT WOULD LIKE TO SPEAK TO NURSING SUPERVISOR; AWAITING ARRIVAL 
OF NURSING SUP TO UNIT TO SPEAK WITH PATIENT

## 2022-07-15 NOTE — NUR
MS RN NOTES



PATIENT WANTS TO BE TRANSFERRED TO A DIFFERENT HOSPITAL, STATING THAT WE ARE NOT TREATING 
HER HER; PATIENT HAS BEEN REFUSING ALL TREATMENT AND MEDICATIONS THROUGHOUT HOSPITALIZATION, 
SEVERAL TIMES; PER AM SHIFT, PATIENT ALSO REFUSED DIALYSIS; PATIENT HAS BEEN TELLING HER 
 OTHERWISE; SPOKE WITH PATIENT'S , HE IS UPSET SAYING THAT WE DO NOT KNOW WHAT 
WE ARE DOING. ASKING WHY SHE IS NOT HAVING DIALYSIS AS IT IS VERY IMPORTANT;  WAS 
INFORMED PATIENT HAS BEEN REFUSING TREATMENT,  ACCUSING STAFF OF LYING AND HUNG UP 
THE PHONE; CHARGE NURSE MADE AWARE;

## 2022-07-15 NOTE — NUR
RN NOTES



PATIENT REFUSED VITAL SIGNS TO BE TAKEN AND REFUSED MEDICATION AFTER GETTING DIZZY DURING 
MEDICATION ADMINISTRATION. LAXIS AND DULCOLAX TAKEN OUT OF PACKAGING TO BE ADMINISTERED BUT 
REFUSED. WASTED IN RX DESTROYER WITNESSED BY ARACELI ARMAS.

## 2022-07-15 NOTE — NUR
RN NOTES



PATIENT SEEN BY DR. VAIBHAV DR. D/C TELE MONITORING NO S/S OR C/O OF CARDIAC DISTRESS OR 
CHEST PAIN. WILL CONTINUE TO MONITOR.

## 2022-07-15 NOTE — NUR
MS RN NOTES



ON CALL MD ORDERED IV ROCEPHIN ABX, PATIENT INFORMED MD ORDERED MEDICATION; PATIENT WAS 
ASKED IF IT IS OK TO ADMINISTER MEDICATION PER MD ORDER; PATIENT AGREED FOR IVABX TO BE 
INFUSED; WITNESSED BY CHANDA PRUITT; CHARGE NURSE AWARE

## 2022-07-15 NOTE — NUR
RN NOTES



PATIENT  AFTER RECHECK, 15 UNITS OF INSULIN ADMINISTERED PER SLIDING SCALE. DR. MONTERO NOTIFIED, NO NEW ORDERS AT THIS TIME WILL CONTINUE TO MONITOR.

## 2022-07-15 NOTE — NUR
MS RN NOTES



PATIENT COMPLAINT OF HEADACHE, REQUESTING TYLENOL; TYLENOL ADMINISTERED, WITNESSED BY CHANDA PRUITT

## 2022-07-15 NOTE — NUR
RN CLOSING NOTE



PATIENT ASLEEP IN BED. NO S/S OF DISTRESS; BREATHING WITHOUT DIFFICULTY ON ROOM AIR. PERFECTO 
MIDLINE #18 SL; LFA #24 SL - INTACT AND PATENT. TELE MONITOR REVEALS SR 71 W/ BORDERLINE 1ST 
DEGREE BLOCK. SAFETY MEASURES IN PLACE: BED LOCKED AND AT LOWEST POSITION, RAILS UP X2, CALL 
BELL WITHIN REACH. WILL ENDORSE TO NEXT SHIFT FOR FLORENCE.

## 2022-07-15 NOTE — NUR
pt refusing dialysis treatment, explained the importance of HD procedure and the 
consequences of refusing treatment, verbalized understanding, primary nurse at bedside. Dr Nix made aware.

## 2022-07-15 NOTE — NUR
MS RN CLOSING NOTES



RECEIVED PATIENT SLEEPING IN BED, NO S/S OF PAIN OR DISCOMFORT NOTED. A/O x3. STABLE ROOM 
AIR, NO S/S OF SOB OR RESPIRATORY DISTRESS. NO S/S OF CARDIAC DISTRESS OR C/O OF CHEST PAIN. 
PATIENT IS CONTINENT AND USES BED PAN. ON BEDREST, CCHO DIET. PERFECTO AV FISTULA, DRESSING 
INTACT AND NO S/S OF BLEEDING NOTED. EBER MIDLINE #18SL AND L FA #24 SL, BOTH INTACT AND 
PATENT AT THIS TIME. NO S/S OF BLEEDING OR INFILTRATION. SKIN IS INTACT. SAFETY MEASURES 
MAINTAINED: BED LOCKED AN AT LOWEST POSITION, RAILS UP x2, CALL LIGHT WITHIN REACH. WILL 
ENDORSE TO NEXT SHIFT ANY FLORENCE.

## 2022-07-16 VITALS — DIASTOLIC BLOOD PRESSURE: 73 MMHG | SYSTOLIC BLOOD PRESSURE: 177 MMHG

## 2022-07-16 VITALS — DIASTOLIC BLOOD PRESSURE: 60 MMHG | SYSTOLIC BLOOD PRESSURE: 122 MMHG

## 2022-07-16 VITALS — SYSTOLIC BLOOD PRESSURE: 123 MMHG | DIASTOLIC BLOOD PRESSURE: 68 MMHG

## 2022-07-16 LAB
BASOPHILS # BLD AUTO: 0.1 K/UL (ref 0–0.2)
BASOPHILS NFR BLD AUTO: 0.6 % (ref 0–2)
EOSINOPHIL NFR BLD AUTO: 0.8 % (ref 0–6)
HCT VFR BLD AUTO: 36 % (ref 33–45)
HGB BLD-MCNC: 11.9 G/DL (ref 11.5–14.8)
LYMPHOCYTES NFR BLD AUTO: 1.4 K/UL (ref 0.8–4.8)
LYMPHOCYTES NFR BLD AUTO: 14 % (ref 20–44)
MCHC RBC AUTO-ENTMCNC: 33 G/DL (ref 31–36)
MCV RBC AUTO: 91 FL (ref 82–100)
MONOCYTES NFR BLD AUTO: 1.2 K/UL (ref 0.1–1.3)
MONOCYTES NFR BLD AUTO: 11.5 % (ref 2–12)
NEUTROPHILS # BLD AUTO: 7.6 K/UL (ref 1.8–8.9)
NEUTROPHILS NFR BLD AUTO: 73.1 % (ref 43–81)
PLATELET # BLD AUTO: 503 K/UL (ref 150–450)
RBC # BLD AUTO: 3.98 MIL/UL (ref 4–5.2)
WBC NRBC COR # BLD AUTO: 10.3 K/UL (ref 4.3–11)

## 2022-07-16 RX ADMIN — Medication SCH MG: at 17:00

## 2022-07-16 RX ADMIN — Medication SCH MG: at 10:49

## 2022-07-16 RX ADMIN — FERROUS SULFATE TAB 325 MG (65 MG ELEMENTAL FE) SCH MG: 325 (65 FE) TAB at 09:00

## 2022-07-16 RX ADMIN — INSULIN HUMAN PRN UNITS: 100 INJECTION, SOLUTION PARENTERAL at 06:40

## 2022-07-16 RX ADMIN — FUROSEMIDE SCH MG: 40 TABLET ORAL at 17:00

## 2022-07-16 RX ADMIN — ISOSORBIDE DINITRATE SCH MG: 20 TABLET ORAL at 17:00

## 2022-07-16 RX ADMIN — ISOSORBIDE DINITRATE SCH MG: 20 TABLET ORAL at 10:48

## 2022-07-16 RX ADMIN — Medication SCH MG: at 11:11

## 2022-07-16 RX ADMIN — DOCUSATE SODIUM SCH MG: 250 CAPSULE, LIQUID FILLED ORAL at 17:00

## 2022-07-16 RX ADMIN — FERROUS SULFATE TAB 325 MG (65 MG ELEMENTAL FE) SCH MG: 325 (65 FE) TAB at 17:00

## 2022-07-16 RX ADMIN — Medication SCH EACH: at 12:15

## 2022-07-16 RX ADMIN — BISOPROLOL FUMARATE SCH MG: 5 TABLET ORAL at 11:23

## 2022-07-16 RX ADMIN — SERTRALINE HYDROCHLORIDE SCH MG: 25 TABLET, FILM COATED ORAL at 11:11

## 2022-07-16 RX ADMIN — ACETAMINOPHEN PRN MG: 325 TABLET ORAL at 17:12

## 2022-07-16 RX ADMIN — Medication SCH MG: at 10:48

## 2022-07-16 RX ADMIN — METOLAZONE SCH MG: 2.5 TABLET ORAL at 17:00

## 2022-07-16 RX ADMIN — PANTOPRAZOLE SODIUM SCH MG: 40 TABLET, DELAYED RELEASE ORAL at 11:10

## 2022-07-16 RX ADMIN — METOLAZONE SCH MG: 2.5 TABLET ORAL at 10:49

## 2022-07-16 RX ADMIN — FUROSEMIDE SCH MG: 40 TABLET ORAL at 11:09

## 2022-07-16 RX ADMIN — CLOPIDOGREL BISULFATE SCH MG: 75 TABLET, FILM COATED ORAL at 11:10

## 2022-07-16 RX ADMIN — ESCITALOPRAM OXALATE SCH MG: 10 TABLET, FILM COATED ORAL at 11:10

## 2022-07-16 RX ADMIN — Medication SCH EACH: at 06:37

## 2022-07-16 RX ADMIN — Medication SCH MG: at 11:10

## 2022-07-16 RX ADMIN — AMLODIPINE BESYLATE SCH MG: 10 TABLET ORAL at 08:27

## 2022-07-16 RX ADMIN — LOSARTAN POTASSIUM SCH MG: 50 TABLET, FILM COATED ORAL at 08:26

## 2022-07-16 RX ADMIN — ASPIRIN 81 MG SCH MG: 81 TABLET ORAL at 10:47

## 2022-07-16 RX ADMIN — GLIMEPIRIDE SCH MG: 4 TABLET ORAL at 10:48

## 2022-07-16 RX ADMIN — GLIMEPIRIDE SCH MG: 4 TABLET ORAL at 17:00

## 2022-07-16 RX ADMIN — MONTELUKAST SODIUM SCH MG: 10 TABLET, FILM COATED ORAL at 09:00

## 2022-07-16 RX ADMIN — DOCUSATE SODIUM SCH MG: 250 CAPSULE, LIQUID FILLED ORAL at 11:10

## 2022-07-16 RX ADMIN — INSULIN HUMAN PRN UNITS: 100 INJECTION, SOLUTION PARENTERAL at 12:24

## 2022-07-16 NOTE — NUR
MS RN DISCHARGE NOTES:



PT WAS SUPPOSED TO BE DISCHARGED TO [facility]UR[facility] [place] BUT REFUSED. PT STATES " 
I WANT TO GO HOME". RN MADE CHARGE RN AWARE. PT IS DC'D TO HOME, PICKED UP BY PARTNER TONNY 
AND SON LAYO. IV ACCESS AND MIDLINE REMOVED. DISCHARGE DOCUMENTS PRINTED AND SIGNED, 
BELONGINGS AND HOME MEDS RECONCILED AND SIGNED.  PT TEACHING DONE AT BEDSIDE, PT VERBALIZED 
UNDERSTANDING. PT IS STABLE UPON DISCHARGE, ESCORTED BY SON AND STAFF VIA WHEELCHAIR TO 
[facility].

## 2022-07-16 NOTE — NUR
MS RN CLOSING NOTES



PATIENT RESTING IN BED COMFORTABLY; A/OX3, ABLE TO MAKE NEEDS KNOWN; PATIENT STILL 
NON-COMPLIANT, ONLY AGREES TO SOME MEDICATIONS AND SOME ACCU-CHECKS; CHARGE NURSES AND MDS 
ARE AWARE OF PATIENT'S NON-COMPLIANCE THROUGHOUT HOSPITALIZATION; PERFECTO FISTULA PRESENT, EBER 
MIDLINE #18 S/L; L FA #24 S/F INTACT AND PATENT, FLUSHING WELL; NO S/S OF REDNESS OR 
INFILTRATION NOTED ON IV SITES; PATIENT AGREED TO HAVE HEMODIALYSIS THIS AM, WILL INFORM DAY 
SHIFT; ALL NEEDS RENDERED; SAFETY PRECAUTIONS IMPLEMENTED; BED LOCKED IN LOW POSITION; SIDE 
RAILSX2; CALL LIGHT WITHIN REACH; WILL ENDORSE FLORENCE TO ONCOMING SHIFT

## 2022-07-16 NOTE — NUR
MS RN NOTES:



PT S/P HEMODIALYSIS, 2 L OF FLUID COLLECTED. PT IS COMFORTABLE IN BED, ASLEEP, NO S/S OF 
ACUTE DISTRESS AT THE MOMENT, DENIES PAIN AT THE MOMENT, WILL CONT TO MONITOR.

## 2022-07-16 NOTE — NUR
MS RN NOTES



PATIENT AGREED FOR ACCU-CHEK IN AM: BLOOD SUGAR 327MG/DL; PER , PATIENT ALSO AGREED 
FOR BLOOD TO BE DRAWN; PATIENT AWARE OF HEMODIALYSIS TO BE DONE IN AM; SPOKE WITH 
HEMODIALYSIS NURSE, CHANDA WHALEN REGARDING HD; PER NURSE, WILL ATTEMPT TO DO HD TODAY WITH 
PATIENT IF PATIENT COMPLIES; IF PATIENT CONTINUES TO REFUSE, DR. MORROW IS ALREADY AWARE; 
CHARGE ALSO AWARE; WILL CONT TO ASSESS AND MONITOR

## 2022-07-16 NOTE — NUR
MS RN OPENING NOTES



PATIENT RECEIVED RESTING IN BED COMFORTABLY; A/OX3, ABLE TO MAKE NEEDS KNOWN;  PERFECTO FISTULA 
PRESENT, EBER MIDLINE #18 S/L; L FA #24 S/F INTACT AND PATENT, FLUSHING WELL; NO S/S OF 
REDNESS OR INFILTRATION NOTED ON IV SITES; SAFETY PRECAUTIONS IMPLEMENTED; BED LOCKED IN LOW 
POSITION; SIDE RAILSX2; CALL LIGHT WITHIN REACH; WILL CONT PLAN OF CARE

## 2022-07-31 ENCOUNTER — HOSPITAL ENCOUNTER (INPATIENT)
Dept: HOSPITAL 54 - ER | Age: 47
LOS: 4 days | Discharge: NURSING, REHAB OR LONG-TERM CARE | DRG: 77 | End: 2022-08-04
Attending: INTERNAL MEDICINE | Admitting: INTERNAL MEDICINE
Payer: MEDICARE

## 2022-07-31 VITALS — HEIGHT: 67 IN | BODY MASS INDEX: 37.35 KG/M2 | WEIGHT: 238 LBS

## 2022-07-31 VITALS — DIASTOLIC BLOOD PRESSURE: 80 MMHG | SYSTOLIC BLOOD PRESSURE: 165 MMHG

## 2022-07-31 VITALS — SYSTOLIC BLOOD PRESSURE: 165 MMHG | DIASTOLIC BLOOD PRESSURE: 80 MMHG

## 2022-07-31 DIAGNOSIS — Z86.73: ICD-10-CM

## 2022-07-31 DIAGNOSIS — I21.4: ICD-10-CM

## 2022-07-31 DIAGNOSIS — E11.65: ICD-10-CM

## 2022-07-31 DIAGNOSIS — Z88.0: ICD-10-CM

## 2022-07-31 DIAGNOSIS — Z95.1: ICD-10-CM

## 2022-07-31 DIAGNOSIS — I16.0: ICD-10-CM

## 2022-07-31 DIAGNOSIS — Z87.891: ICD-10-CM

## 2022-07-31 DIAGNOSIS — D63.8: ICD-10-CM

## 2022-07-31 DIAGNOSIS — I12.0: ICD-10-CM

## 2022-07-31 DIAGNOSIS — E11.22: ICD-10-CM

## 2022-07-31 DIAGNOSIS — I67.4: Primary | ICD-10-CM

## 2022-07-31 DIAGNOSIS — I16.1: ICD-10-CM

## 2022-07-31 DIAGNOSIS — I25.10: ICD-10-CM

## 2022-07-31 DIAGNOSIS — Z20.822: ICD-10-CM

## 2022-07-31 DIAGNOSIS — E87.6: ICD-10-CM

## 2022-07-31 DIAGNOSIS — Z91.19: ICD-10-CM

## 2022-07-31 DIAGNOSIS — M89.8X9: ICD-10-CM

## 2022-07-31 DIAGNOSIS — N18.6: ICD-10-CM

## 2022-07-31 DIAGNOSIS — Z82.49: ICD-10-CM

## 2022-07-31 DIAGNOSIS — Z91.14: ICD-10-CM

## 2022-07-31 DIAGNOSIS — E78.5: ICD-10-CM

## 2022-07-31 DIAGNOSIS — Z99.2: ICD-10-CM

## 2022-07-31 LAB
ALBUMIN SERPL BCP-MCNC: 3.3 G/DL (ref 3.4–5)
ALP SERPL-CCNC: 108 U/L (ref 46–116)
ALT SERPL W P-5'-P-CCNC: 27 U/L (ref 12–78)
AST SERPL W P-5'-P-CCNC: 45 U/L (ref 15–37)
BASOPHILS # BLD AUTO: 0.1 K/UL (ref 0–0.2)
BASOPHILS NFR BLD AUTO: 0.5 % (ref 0–2)
BILIRUB DIRECT SERPL-MCNC: 0.1 MG/DL (ref 0–0.2)
BILIRUB SERPL-MCNC: 0.3 MG/DL (ref 0.2–1)
BUN SERPL-MCNC: 54 MG/DL (ref 7–18)
CALCIUM SERPL-MCNC: 9.8 MG/DL (ref 8.5–10.1)
CHLORIDE SERPL-SCNC: 97 MMOL/L (ref 98–107)
CO2 SERPL-SCNC: 23 MMOL/L (ref 21–32)
CREAT SERPL-MCNC: 2.6 MG/DL (ref 0.6–1.3)
EOSINOPHIL NFR BLD AUTO: 0.3 % (ref 0–6)
GLUCOSE SERPL-MCNC: 252 MG/DL (ref 74–106)
HCT VFR BLD AUTO: 33 % (ref 33–45)
HGB BLD-MCNC: 10.9 G/DL (ref 11.5–14.8)
LYMPHOCYTES NFR BLD AUTO: 0.8 K/UL (ref 0.8–4.8)
LYMPHOCYTES NFR BLD AUTO: 5.3 % (ref 20–44)
MCHC RBC AUTO-ENTMCNC: 33 G/DL (ref 31–36)
MCV RBC AUTO: 91 FL (ref 82–100)
MONOCYTES NFR BLD AUTO: 0.9 K/UL (ref 0.1–1.3)
MONOCYTES NFR BLD AUTO: 5.9 % (ref 2–12)
NEUTROPHILS # BLD AUTO: 14.2 K/UL (ref 1.8–8.9)
NEUTROPHILS NFR BLD AUTO: 88 % (ref 43–81)
PLATELET # BLD AUTO: 432 K/UL (ref 150–450)
POTASSIUM SERPL-SCNC: 3.5 MMOL/L (ref 3.5–5.1)
PROT SERPL-MCNC: 8.1 G/DL (ref 6.4–8.2)
RBC # BLD AUTO: 3.66 MIL/UL (ref 4–5.2)
SODIUM SERPL-SCNC: 134 MMOL/L (ref 136–145)
WBC NRBC COR # BLD AUTO: 16.1 K/UL (ref 4.3–11)

## 2022-07-31 PROCEDURE — C9803 HOPD COVID-19 SPEC COLLECT: HCPCS

## 2022-07-31 PROCEDURE — G0378 HOSPITAL OBSERVATION PER HR: HCPCS

## 2022-07-31 RX ADMIN — HEPARIN SODIUM SCH UNITS: 5000 INJECTION INTRAVENOUS; SUBCUTANEOUS at 20:54

## 2022-07-31 RX ADMIN — Medication SCH EACH: at 21:26

## 2022-07-31 RX ADMIN — Medication PRN MG: at 23:44

## 2022-07-31 RX ADMIN — Medication PRN MG: at 18:07

## 2022-07-31 RX ADMIN — HUMAN INSULIN PRN UNIT: 100 INJECTION, SOLUTION SUBCUTANEOUS at 21:43

## 2022-07-31 RX ADMIN — ATORVASTATIN CALCIUM SCH MG: 40 TABLET, FILM COATED ORAL at 21:16

## 2022-07-31 RX ADMIN — DEXTROSE MONOHYDRATE PRN MG: 50 INJECTION, SOLUTION INTRAVENOUS at 20:56

## 2022-07-31 RX ADMIN — Medication SCH EACH: at 17:42

## 2022-07-31 NOTE — NUR
THE PATIENT REFUSED CT SCAN DESPITE EXPLAINING RISKS AND BENEFITS MULTIPLE 
TIMES. DR WHITE MADE AWARE.

## 2022-07-31 NOTE — NUR
RN NOTE

PT'S /78 HR 82; PT ADMINISTERED HYDRALAZINE 10 MG. PT ALSO COMPLAINS OF 10/10 
ABDOMINAL PAIN; PT ADMINISTERED MORPHINE 2 MG.

## 2022-07-31 NOTE — NUR
LVN/MED RECON



UNABLE TO UPDATE HOME MEDICATION INFORMATION AT THIS TIME. AS PER PATIENT 
REQUESTED, CALLED AND SPOKE WITH SON. PER SON WILL PROVIDE INFORMATION LATER 
DUE TO NOT AT HOME AT THIS TIME.

## 2022-07-31 NOTE — NUR
RN INITIAL NOTE

PT ARRIVED TO UNIT VIA GURNEY. PT WITH ADMITTING DIAGNOSIS OF OF ESRD AND HTN. MEDICAL 
HISTORY OF HF, HTN, ESRD, AND DM. PT AWAKE, A&O X4, CALM, COOPERATIVE. ON RA WITH CURRENT 
O2SAT OF 98%; NO SIGNS OF RESP DISTRESS, NO SOB OR COUGH NOTED, NON-LABORED AND EQUAL 
BREATHING. PT ATTACHED TO EXTERNAL MONITOR, SR WITH CURRENT HR OF 79. SKIN NOTED TO BE 
INTACT, NO OPEN WOUNDS. IV ACCESS ON LFA 18G AND LEFT WRIST 20G, INTACT AND PATENT, FLUSHES 
EASILY WITH NO RESISTANCE. PT NOTED TO HAVE TRUDY FISTULA, BRUIT AUSCULTATED AND THRILL 
PALPATED. BED IN LOWEST POSITION, CALL LIGHT WITHIN REACH, SIDE RAILS UP X3. WILL INITIATE 
PLAN OF CARE.

## 2022-07-31 NOTE — NUR
ALEXIA88 FROM HOME FOR ELEVATED BP (225/90); PT C/O HEADACHE, N/V, AND BLURRED 
VISION SINCE LAST NIGHT. ALSO C/O LEFT LEG NUMBNESS. PT VERBALLT RESPONSIVE. TO 
ER BED 9. ATTACHED TO MONITOR.

## 2022-08-01 VITALS — DIASTOLIC BLOOD PRESSURE: 72 MMHG | SYSTOLIC BLOOD PRESSURE: 132 MMHG

## 2022-08-01 VITALS — DIASTOLIC BLOOD PRESSURE: 66 MMHG | SYSTOLIC BLOOD PRESSURE: 128 MMHG

## 2022-08-01 VITALS — SYSTOLIC BLOOD PRESSURE: 182 MMHG | DIASTOLIC BLOOD PRESSURE: 78 MMHG

## 2022-08-01 VITALS — DIASTOLIC BLOOD PRESSURE: 78 MMHG | SYSTOLIC BLOOD PRESSURE: 182 MMHG

## 2022-08-01 VITALS — DIASTOLIC BLOOD PRESSURE: 75 MMHG | SYSTOLIC BLOOD PRESSURE: 154 MMHG

## 2022-08-01 VITALS — SYSTOLIC BLOOD PRESSURE: 154 MMHG | DIASTOLIC BLOOD PRESSURE: 75 MMHG

## 2022-08-01 LAB
ALBUMIN SERPL BCP-MCNC: 3 G/DL (ref 3.4–5)
ALP SERPL-CCNC: 143 U/L (ref 46–116)
ALT SERPL W P-5'-P-CCNC: 42 U/L (ref 12–78)
AST SERPL W P-5'-P-CCNC: 61 U/L (ref 15–37)
BASOPHILS # BLD AUTO: 0.1 K/UL (ref 0–0.2)
BASOPHILS NFR BLD AUTO: 0.8 % (ref 0–2)
BILIRUB SERPL-MCNC: 0.2 MG/DL (ref 0.2–1)
BUN SERPL-MCNC: 57 MG/DL (ref 7–18)
CALCIUM SERPL-MCNC: 9.2 MG/DL (ref 8.5–10.1)
CHLORIDE SERPL-SCNC: 96 MMOL/L (ref 98–107)
CO2 SERPL-SCNC: 27 MMOL/L (ref 21–32)
CREAT SERPL-MCNC: 2.3 MG/DL (ref 0.6–1.3)
EOSINOPHIL NFR BLD AUTO: 2.2 % (ref 0–6)
GLUCOSE SERPL-MCNC: 263 MG/DL (ref 74–106)
HCT VFR BLD AUTO: 33 % (ref 33–45)
HGB BLD-MCNC: 10.9 G/DL (ref 11.5–14.8)
LYMPHOCYTES NFR BLD AUTO: 1.5 K/UL (ref 0.8–4.8)
LYMPHOCYTES NFR BLD AUTO: 11.3 % (ref 20–44)
MAGNESIUM SERPL-MCNC: 2.4 MG/DL (ref 1.8–2.4)
MCHC RBC AUTO-ENTMCNC: 33 G/DL (ref 31–36)
MCV RBC AUTO: 91 FL (ref 82–100)
MONOCYTES NFR BLD AUTO: 1.6 K/UL (ref 0.1–1.3)
MONOCYTES NFR BLD AUTO: 12.4 % (ref 2–12)
NEUTROPHILS # BLD AUTO: 9.5 K/UL (ref 1.8–8.9)
NEUTROPHILS NFR BLD AUTO: 73.3 % (ref 43–81)
PHOSPHATE SERPL-MCNC: 3.2 MG/DL (ref 2.5–4.9)
PLATELET # BLD AUTO: 455 K/UL (ref 150–450)
POTASSIUM SERPL-SCNC: 3.7 MMOL/L (ref 3.5–5.1)
PROT SERPL-MCNC: 7.7 G/DL (ref 6.4–8.2)
RBC # BLD AUTO: 3.63 MIL/UL (ref 4–5.2)
SODIUM SERPL-SCNC: 134 MMOL/L (ref 136–145)
WBC NRBC COR # BLD AUTO: 12.9 K/UL (ref 4.3–11)

## 2022-08-01 PROCEDURE — 5A1D70Z PERFORMANCE OF URINARY FILTRATION, INTERMITTENT, LESS THAN 6 HOURS PER DAY: ICD-10-PCS

## 2022-08-01 RX ADMIN — ESCITALOPRAM OXALATE SCH MG: 10 TABLET, FILM COATED ORAL at 09:44

## 2022-08-01 RX ADMIN — INSULIN HUMAN PRN UNITS: 100 INJECTION, SOLUTION PARENTERAL at 11:46

## 2022-08-01 RX ADMIN — DEXTROSE MONOHYDRATE PRN MG: 50 INJECTION, SOLUTION INTRAVENOUS at 08:22

## 2022-08-01 RX ADMIN — DOCUSATE SODIUM SCH MG: 250 CAPSULE, LIQUID FILLED ORAL at 16:31

## 2022-08-01 RX ADMIN — BISOPROLOL FUMARATE SCH MG: 5 TABLET ORAL at 10:24

## 2022-08-01 RX ADMIN — TRAZODONE HYDROCHLORIDE SCH MG: 50 TABLET ORAL at 22:05

## 2022-08-01 RX ADMIN — INSULIN HUMAN PRN UNITS: 100 INJECTION, SOLUTION PARENTERAL at 17:38

## 2022-08-01 RX ADMIN — Medication SCH EACH: at 17:39

## 2022-08-01 RX ADMIN — Medication SCH EACH: at 22:14

## 2022-08-01 RX ADMIN — Medication SCH MG: at 10:24

## 2022-08-01 RX ADMIN — GABAPENTIN SCH MG: 100 CAPSULE ORAL at 22:06

## 2022-08-01 RX ADMIN — HEPARIN SODIUM SCH UNITS: 5000 INJECTION INTRAVENOUS; SUBCUTANEOUS at 22:09

## 2022-08-01 RX ADMIN — ATORVASTATIN CALCIUM SCH MG: 40 TABLET, FILM COATED ORAL at 22:06

## 2022-08-01 RX ADMIN — MONTELUKAST SODIUM SCH MG: 10 TABLET, FILM COATED ORAL at 22:05

## 2022-08-01 RX ADMIN — FERROUS SULFATE TAB 325 MG (65 MG ELEMENTAL FE) SCH MG: 325 (65 FE) TAB at 09:44

## 2022-08-01 RX ADMIN — Medication SCH MG: at 09:45

## 2022-08-01 RX ADMIN — MINOXIDIL SCH MG: 2.5 TABLET ORAL at 09:46

## 2022-08-01 RX ADMIN — Medication SCH EACH: at 07:16

## 2022-08-01 RX ADMIN — DEXTROSE MONOHYDRATE PRN MG: 50 INJECTION, SOLUTION INTRAVENOUS at 22:21

## 2022-08-01 RX ADMIN — Medication SCH MG: at 16:23

## 2022-08-01 RX ADMIN — DOCUSATE SODIUM SCH MG: 250 CAPSULE, LIQUID FILLED ORAL at 09:46

## 2022-08-01 RX ADMIN — HUMAN INSULIN PRN UNIT: 100 INJECTION, SOLUTION SUBCUTANEOUS at 07:28

## 2022-08-01 RX ADMIN — Medication PRN MG: at 09:48

## 2022-08-01 RX ADMIN — HUMAN INSULIN PRN UNIT: 100 INJECTION, SOLUTION SUBCUTANEOUS at 22:34

## 2022-08-01 RX ADMIN — HEPARIN SODIUM SCH UNITS: 5000 INJECTION INTRAVENOUS; SUBCUTANEOUS at 08:07

## 2022-08-01 RX ADMIN — CLOPIDOGREL BISULFATE SCH MG: 75 TABLET, FILM COATED ORAL at 09:44

## 2022-08-01 RX ADMIN — FUROSEMIDE SCH MG: 40 TABLET ORAL at 16:23

## 2022-08-01 RX ADMIN — Medication SCH MG: at 12:08

## 2022-08-01 RX ADMIN — HUMAN INSULIN PRN UNIT: 100 INJECTION, SOLUTION SUBCUTANEOUS at 07:36

## 2022-08-01 RX ADMIN — METOLAZONE SCH MG: 2.5 TABLET ORAL at 16:31

## 2022-08-01 RX ADMIN — Medication PRN MG: at 04:54

## 2022-08-01 RX ADMIN — Medication SCH MG: at 09:43

## 2022-08-01 RX ADMIN — Medication SCH MG: at 17:43

## 2022-08-01 RX ADMIN — Medication SCH MG: at 08:08

## 2022-08-01 RX ADMIN — FERROUS SULFATE TAB 325 MG (65 MG ELEMENTAL FE) SCH MG: 325 (65 FE) TAB at 16:23

## 2022-08-01 RX ADMIN — Medication SCH EACH: at 11:44

## 2022-08-01 NOTE — NUR
TELE RN CLOSING NOTES



PT IN BED RESTING AT MODERATE HIGH BACKREST POSITION. A/O X4. ABLE TO VERBALIZED NEEDS.  ON 
ROOM AIR, TOLERATING WELL, BREATHING EVEN AND UNLABORED, NO SOB NOTED DURING SHIFT. EXTERNAL 
CARDIAC MONITOR SHOWS NSR WITH HR 75 AT THIS TIME, NO C/O CARDIAC DISTRESS VOICED.  PT WITH 
PERFECTO AV FISTULA  IN PLACE WITH POSITIVE BRUIT AND SHRILL NOTED. IV ACCESSES ON LEFT HAND 22G 
AND LFA 18G BOTH INTACT AND PATENT. ARMENTA INTACT DRAINING CLEAR YELLOW URINE, ARMENTA CARE 
DONE. ALL NEEDS AND CARE ATTENDED WELL.  SAFETY MEASURES MAINTAINED: BED IN LOWEST LOCKED 
POSITION WITH SR - UP X2. TRAY TABLE AND CALL LIGHT  W/I EASY REACH OF PT. WILL ENDORSE PLAN 
OF CARE NIGHT SHIFT NURSE.

## 2022-08-01 NOTE — NUR
RT



FOUND PT ON ROOM AIR. NO RESP DISTRESS. PLACED BACK ON 2L NC. SPO2 99%. PT APPEARS 
COMFORTABLE ON CURRENT O2 SETTINGS.

-------------------------------------------------------------------------------

Addendum: 08/01/22 at 0248 by Donnie Grayson RT

-------------------------------------------------------------------------------

Amended: Links added.

## 2022-08-01 NOTE — NUR
TELE RN CLOSING NOTE

PT REMAINS IN BED, A&O X4, CALM, COOPERATIVE, SLEPT INTERMITTENTLY THROUGHOUT THE NIGHT. 
REMAINS ON RA WITH O2SAT RANGING FROM 96-98% THROUGHOUT THE NIGHT; PT SHOWS NO S/S OF RESP 
DISTRESS, NO SOB OR COUGH, NON-LABORED AND EQUAL BREATHING. ATTACHED TO EXTERNAL MONITOR, SR 
WITH HR RANGING FROM 79-87 THROUGHOUT THE NIGHT. ARMENTA INTACT AND PATENT WITH NO SIGNS OF 
LEAKING, DRAINING CLEAR AND YELLOW URINE. TRUDY FISTULA INTACT AND PATENT. IV ACCESS ON LEFT 
WRIST 20G AND LFA 18G INTACT AND PATENT, FLUSHES EASILY WITH NO RESISTANCE; NO MEDS/FLUIDS 
RUNNING AT THE MOMENT. BED IN LOWEST POSITION, CALL LIGHT WITHIN REACH, SIDE RAILS UP X3. 
WILL ENDORSE TO DAYSHIFT NURSE TO CONTINUE CARE.

## 2022-08-01 NOTE — NUR
TELE RN OPENING NOTE

RECEIVED PATIENT IN BED, AWAKE A&O X4, CALM, ABLE TO MAKE NEEDS KNOWN, ON RA WITH NO NO S/S 
OF RESP DISTRESS, NO SOB OR COUGH, NON-LABORED AND EQUAL BREATHING. ATTACHED TO EXTERNAL 
MONITOR, SR WITH HR OF AROUND 80-90'S, ARMENTA INTACT AND PATENT WITH NO SIGNS OF LEAKING, 
DRAINING CLEAR AND YELLOW URINE. TRUDY FISTULA INTACT WITH POSITIVE PALPABLE BRUIT. IV ACCESS 
ON LEFT WRIST 22G AND LFA 18G INTACT AND PATENT, FLUSHES EASILY WITH NO RESISTANCE; NO 
MEDS/FLUIDS RUNNING AT THE MOMENT. BED IN LOWEST POSITION, CALL LIGHT AND TRAY TABLE WITHIN 
REACH, SIDE RAILS UP X3. WILL CONTINUE TO MONITOR FOR MY SHIFT

## 2022-08-01 NOTE — NUR
RN NOTES



PATIENT HAD 1 EMESIS ABOUT 10 ML, PATIENT IS NAUSEOUS AS WELL, GIVEN ZOFRAN 4 MG PRN .WILL 
CONTINUE TO MONITOR

## 2022-08-01 NOTE — NUR
MS RN NOTES

REPORTED BY HD NURSE,HD TREATMENT COMPLETED FOR 2 HOURS,TAKEN OUT 800ML,PATIENT TOLERATED 
THE PROCEDURE WELL.

## 2022-08-01 NOTE — NUR
TELE RN NOTES

RECEIVED ON BED SLEEPING,AROUSABLE TO VERBAL STIMULI,BREATHING EASY,NO SOB,SALINE LOCK 
LFA,LEFT HAND INTACT AND PATENT.WITH PERFECTO AV FISTULA FOR HD ACCESS,GOOD BRUIT NOTED,AWAITING 
FOR HD TREATMENT TODAY.WITH ARMENTA CATH IN PLACE DRAINING GOOD OUTPUT.DENIES DISCOMFORTS AT 
THE MOMENT.CALL LIGHT IN REACH,NEEDS ANTICIPATED.

## 2022-08-01 NOTE — NUR
RN NOTE

PT COMPLAINS OF 8/10 ABDOMINAL PAIN. PT ADMINISTERED MORPHINE 2 MG. WILL MONITOR FOR 
EFFECTIVENESS.

## 2022-08-01 NOTE — NUR
PATIENT IS AWAKE AND FOUND WITHOUT O2. O PLACED BACK ON AND APPEAR W/O DISTRESS.


-------------------------------------------------------------------------------

Addendum: 08/01/22 at 0927 by LISA EMERY RT

-------------------------------------------------------------------------------

Amended: Links added.

## 2022-08-01 NOTE — NUR
RN NOTES



PATIENT COMPLAINING OF 10/10 PAIN IN THE ABDOMEN, GIVEN PRN MORPHINE 2ML. WILL CONTINUE TO 
REASSESS PATIENT

## 2022-08-02 VITALS — DIASTOLIC BLOOD PRESSURE: 61 MMHG | SYSTOLIC BLOOD PRESSURE: 128 MMHG

## 2022-08-02 VITALS — SYSTOLIC BLOOD PRESSURE: 112 MMHG | DIASTOLIC BLOOD PRESSURE: 66 MMHG

## 2022-08-02 VITALS — DIASTOLIC BLOOD PRESSURE: 57 MMHG | SYSTOLIC BLOOD PRESSURE: 123 MMHG

## 2022-08-02 VITALS — DIASTOLIC BLOOD PRESSURE: 69 MMHG | SYSTOLIC BLOOD PRESSURE: 143 MMHG

## 2022-08-02 VITALS — SYSTOLIC BLOOD PRESSURE: 102 MMHG | DIASTOLIC BLOOD PRESSURE: 51 MMHG

## 2022-08-02 LAB
BASOPHILS # BLD AUTO: 0.1 K/UL (ref 0–0.2)
BASOPHILS NFR BLD AUTO: 1 % (ref 0–2)
BUN SERPL-MCNC: 44 MG/DL (ref 7–18)
CALCIUM SERPL-MCNC: 9.7 MG/DL (ref 8.5–10.1)
CHLORIDE SERPL-SCNC: 97 MMOL/L (ref 98–107)
CO2 SERPL-SCNC: 28 MMOL/L (ref 21–32)
CREAT SERPL-MCNC: 2.4 MG/DL (ref 0.6–1.3)
EOSINOPHIL NFR BLD AUTO: 5 % (ref 0–6)
GLUCOSE SERPL-MCNC: 168 MG/DL (ref 74–106)
HCT VFR BLD AUTO: 34 % (ref 33–45)
HGB BLD-MCNC: 11.2 G/DL (ref 11.5–14.8)
LYMPHOCYTES NFR BLD AUTO: 1.3 K/UL (ref 0.8–4.8)
LYMPHOCYTES NFR BLD AUTO: 14.7 % (ref 20–44)
MAGNESIUM SERPL-MCNC: 2.4 MG/DL (ref 1.8–2.4)
MCHC RBC AUTO-ENTMCNC: 33 G/DL (ref 31–36)
MCV RBC AUTO: 91 FL (ref 82–100)
MONOCYTES NFR BLD AUTO: 1.1 K/UL (ref 0.1–1.3)
MONOCYTES NFR BLD AUTO: 12.7 % (ref 2–12)
NEUTROPHILS # BLD AUTO: 6 K/UL (ref 1.8–8.9)
NEUTROPHILS NFR BLD AUTO: 66.6 % (ref 43–81)
PHOSPHATE SERPL-MCNC: 4.2 MG/DL (ref 2.5–4.9)
PLATELET # BLD AUTO: 459 K/UL (ref 150–450)
POTASSIUM SERPL-SCNC: 3.9 MMOL/L (ref 3.5–5.1)
RBC # BLD AUTO: 3.72 MIL/UL (ref 4–5.2)
SODIUM SERPL-SCNC: 134 MMOL/L (ref 136–145)
WBC NRBC COR # BLD AUTO: 9 K/UL (ref 4.3–11)

## 2022-08-02 RX ADMIN — Medication SCH EACH: at 05:34

## 2022-08-02 RX ADMIN — Medication SCH MG: at 17:04

## 2022-08-02 RX ADMIN — HEPARIN SODIUM SCH UNITS: 5000 INJECTION INTRAVENOUS; SUBCUTANEOUS at 21:26

## 2022-08-02 RX ADMIN — Medication SCH MG: at 09:10

## 2022-08-02 RX ADMIN — Medication SCH MG: at 08:09

## 2022-08-02 RX ADMIN — MINOXIDIL SCH MG: 2.5 TABLET ORAL at 09:10

## 2022-08-02 RX ADMIN — Medication SCH MG: at 09:09

## 2022-08-02 RX ADMIN — ATORVASTATIN CALCIUM SCH MG: 40 TABLET, FILM COATED ORAL at 21:31

## 2022-08-02 RX ADMIN — DOCUSATE SODIUM SCH MG: 250 CAPSULE, LIQUID FILLED ORAL at 17:04

## 2022-08-02 RX ADMIN — Medication SCH EACH: at 11:55

## 2022-08-02 RX ADMIN — HUMAN INSULIN PRN UNIT: 100 INJECTION, SOLUTION SUBCUTANEOUS at 11:54

## 2022-08-02 RX ADMIN — CLOPIDOGREL BISULFATE SCH MG: 75 TABLET, FILM COATED ORAL at 09:10

## 2022-08-02 RX ADMIN — HUMAN INSULIN PRN UNIT: 100 INJECTION, SOLUTION SUBCUTANEOUS at 22:14

## 2022-08-02 RX ADMIN — GABAPENTIN SCH MG: 100 CAPSULE ORAL at 21:31

## 2022-08-02 RX ADMIN — FERROUS SULFATE TAB 325 MG (65 MG ELEMENTAL FE) SCH MG: 325 (65 FE) TAB at 17:05

## 2022-08-02 RX ADMIN — Medication SCH MG: at 12:21

## 2022-08-02 RX ADMIN — DEXTROSE MONOHYDRATE PRN MG: 50 INJECTION, SOLUTION INTRAVENOUS at 06:50

## 2022-08-02 RX ADMIN — ESCITALOPRAM OXALATE SCH MG: 10 TABLET, FILM COATED ORAL at 09:10

## 2022-08-02 RX ADMIN — FUROSEMIDE SCH MG: 40 TABLET ORAL at 09:22

## 2022-08-02 RX ADMIN — Medication PRN MG: at 23:20

## 2022-08-02 RX ADMIN — DOCUSATE SODIUM SCH MG: 250 CAPSULE, LIQUID FILLED ORAL at 09:08

## 2022-08-02 RX ADMIN — PANTOPRAZOLE SODIUM SCH MG: 40 TABLET, DELAYED RELEASE ORAL at 08:09

## 2022-08-02 RX ADMIN — FUROSEMIDE SCH MG: 40 TABLET ORAL at 17:04

## 2022-08-02 RX ADMIN — METOLAZONE SCH MG: 2.5 TABLET ORAL at 17:06

## 2022-08-02 RX ADMIN — METOLAZONE SCH MG: 2.5 TABLET ORAL at 09:16

## 2022-08-02 RX ADMIN — TRAZODONE HYDROCHLORIDE SCH MG: 50 TABLET ORAL at 21:32

## 2022-08-02 RX ADMIN — BISOPROLOL FUMARATE SCH MG: 5 TABLET ORAL at 09:16

## 2022-08-02 RX ADMIN — HEPARIN SODIUM SCH UNITS: 5000 INJECTION INTRAVENOUS; SUBCUTANEOUS at 09:19

## 2022-08-02 RX ADMIN — Medication SCH EACH: at 16:47

## 2022-08-02 RX ADMIN — Medication SCH EACH: at 22:10

## 2022-08-02 RX ADMIN — HUMAN INSULIN PRN UNIT: 100 INJECTION, SOLUTION SUBCUTANEOUS at 16:47

## 2022-08-02 RX ADMIN — Medication SCH MG: at 17:05

## 2022-08-02 RX ADMIN — MONTELUKAST SODIUM SCH MG: 10 TABLET, FILM COATED ORAL at 21:31

## 2022-08-02 RX ADMIN — FERROUS SULFATE TAB 325 MG (65 MG ELEMENTAL FE) SCH MG: 325 (65 FE) TAB at 09:09

## 2022-08-02 RX ADMIN — INSULIN HUMAN PRN UNITS: 100 INJECTION, SOLUTION PARENTERAL at 05:37

## 2022-08-02 RX ADMIN — MECLIZINE HYDROCLORIDE PRN MG: 25 TABLET ORAL at 12:21

## 2022-08-02 NOTE — NUR
TELE RN OPENING NOTES



RECEIVED PT IN BED ASLEEP, EASILY AROUSED. A/O X4, ABLE TO MAKE NEEDS KNOWN. ON RA, 
TOLERATING WELL. NO SOB NOTED AT THIS TIME. NOT IN ANY SIGN OF RESPIRATORY DISTRESS. ON TELE 
CARDIAC MONITOR WITH CURRENT READING OF SINUS RHYTHM, HR 70. NO C/O CARDIAC DISTRESS VOICED 
AT THIS TIME. IV SITE IN L HAND G #22 AND LFA G #18 INTACT AND PATENT. PERFECTO AV FISTULA 
INTACT. SAFETY MEASURES IN PLACE: BED IN LOWEST AND LOCKED POSITION, SIDE RAILS UP X2, CALL 
LIGHT WITHIN REACH. WILL CONTINUE TO MONITOR PT.

## 2022-08-02 NOTE — NUR
TELE RN OPENING NOTE



RECEIVED PATIENT WITH EYES CLOSED, EASY TO AROUSE. NO S/S OF APPARENT DISTRESS IN ROOM AIR. 
DENIES PAIN AT THIS TIME. TELE MONITOR READING SR WITH 77 BPM AT THIS TIME. NO FLUIDS 
RUNNING AT THIS TIME. RE-ORIENTED AND ENCOURAGED WITH THE USE OF CALL LIGHT. ARMENTA DRAINING 
DARK YELLOW URINE. SAFETY IN PLACE. WILL CONTINUE WITH PATIENT'S PLAN OF CARE.

## 2022-08-02 NOTE — NUR
TELE RN NOTE



BLOOD SUGAR 418, 30 MINUTES AFTER 10 UNITS REGULAR INSULIN. MESSAGED DOCTOR ORIANA ON 
CALL. DOCTOR ORDERED 20 UNITS OF REGULAR INSULIN AND TO RE-CHECK BS AT 6 AM. WILL CARRY OUT 
ORDER. PATIENT IS NON-COMPLIANT WITH DIET, PATIENT TEACHING WAS DONE. PATIENT ACKNOWLEDGED.

## 2022-08-02 NOTE — NUR
TELE RN CLOSING NOTES



PT IN BED ASLEEP, EASILY AROUSED. A/O X4, ABLE TO MAKE NEEDS KNOWN. ON RA, TOLERATING WELL. 
NO SOB NOTED AT THIS TIME. NOT IN ANY SIGN OF RESPIRATORY DISTRESS. ON TELE CARDIAC MONITOR 
WITH CURRENT READING OF SINUS RHYTHM WITH SINUS MONICA, HR 58. NO C/O CARDIAC DISTRESS VOICED 
AT THIS TIME. IV SITE IN L HAND G #22 AND LFA G #18 INTACT AND PATENT. PERFECTO AV FISTULA 
INTACT. ALL NEEDS ATTENDED. KEPT CLEAN AND COMFORTABLE. SAFETY MEASURES IN PLACE: BED IN 
LOWEST AND LOCKED POSITION, SIDE RAILS UP X2, CALL LIGHT WITHIN REACH. WILL ENDORSE TO NIGHT 
SHIFT NURSE FOR FLORENCE.

## 2022-08-03 VITALS — SYSTOLIC BLOOD PRESSURE: 140 MMHG | DIASTOLIC BLOOD PRESSURE: 70 MMHG

## 2022-08-03 VITALS — SYSTOLIC BLOOD PRESSURE: 133 MMHG | DIASTOLIC BLOOD PRESSURE: 78 MMHG

## 2022-08-03 VITALS — SYSTOLIC BLOOD PRESSURE: 153 MMHG | DIASTOLIC BLOOD PRESSURE: 78 MMHG

## 2022-08-03 VITALS — DIASTOLIC BLOOD PRESSURE: 52 MMHG | SYSTOLIC BLOOD PRESSURE: 110 MMHG

## 2022-08-03 VITALS — DIASTOLIC BLOOD PRESSURE: 66 MMHG | SYSTOLIC BLOOD PRESSURE: 135 MMHG

## 2022-08-03 LAB
BASOPHILS # BLD AUTO: 0.1 K/UL (ref 0–0.2)
BASOPHILS NFR BLD AUTO: 1.3 % (ref 0–2)
BASOPHILS NFR BLD MANUAL: 1 % (ref 0–2)
BUN SERPL-MCNC: 53 MG/DL (ref 7–18)
CALCIUM SERPL-MCNC: 9.5 MG/DL (ref 8.5–10.1)
CHLORIDE SERPL-SCNC: 96 MMOL/L (ref 98–107)
CO2 SERPL-SCNC: 28 MMOL/L (ref 21–32)
CREAT SERPL-MCNC: 3 MG/DL (ref 0.6–1.3)
EOSINOPHIL NFR BLD AUTO: 6.4 % (ref 0–6)
EOSINOPHIL NFR BLD MANUAL: 5 % (ref 0–4)
GLUCOSE SERPL-MCNC: 268 MG/DL (ref 74–106)
HCT VFR BLD AUTO: 33 % (ref 33–45)
HGB BLD-MCNC: 10.6 G/DL (ref 11.5–14.8)
LYMPHOCYTES NFR BLD AUTO: 1.7 K/UL (ref 0.8–4.8)
LYMPHOCYTES NFR BLD AUTO: 19.3 % (ref 20–44)
LYMPHOCYTES NFR BLD MANUAL: 22 % (ref 16–48)
MAGNESIUM SERPL-MCNC: 2.2 MG/DL (ref 1.8–2.4)
MCHC RBC AUTO-ENTMCNC: 33 G/DL (ref 31–36)
MCV RBC AUTO: 91 FL (ref 82–100)
MONOCYTES NFR BLD AUTO: 1.4 K/UL (ref 0.1–1.3)
MONOCYTES NFR BLD AUTO: 15.7 % (ref 2–12)
MONOCYTES NFR BLD MANUAL: 12 % (ref 0–11)
NEUTROPHILS # BLD AUTO: 5 K/UL (ref 1.8–8.9)
NEUTROPHILS NFR BLD AUTO: 57.3 % (ref 43–81)
NEUTS SEG NFR BLD MANUAL: 60 % (ref 42–76)
PHOSPHATE SERPL-MCNC: 3.4 MG/DL (ref 2.5–4.9)
PLATELET # BLD AUTO: 468 K/UL (ref 150–450)
POTASSIUM SERPL-SCNC: 3.5 MMOL/L (ref 3.5–5.1)
RBC # BLD AUTO: 3.58 MIL/UL (ref 4–5.2)
SODIUM SERPL-SCNC: 134 MMOL/L (ref 136–145)
WBC NRBC COR # BLD AUTO: 8.7 K/UL (ref 4.3–11)

## 2022-08-03 RX ADMIN — PANTOPRAZOLE SODIUM SCH MG: 40 TABLET, DELAYED RELEASE ORAL at 09:51

## 2022-08-03 RX ADMIN — METOLAZONE SCH MG: 2.5 TABLET ORAL at 17:37

## 2022-08-03 RX ADMIN — DOCUSATE SODIUM SCH MG: 250 CAPSULE, LIQUID FILLED ORAL at 17:36

## 2022-08-03 RX ADMIN — Medication SCH MG: at 17:36

## 2022-08-03 RX ADMIN — DEXTROSE MONOHYDRATE PRN MG: 50 INJECTION, SOLUTION INTRAVENOUS at 13:21

## 2022-08-03 RX ADMIN — METOLAZONE SCH MG: 2.5 TABLET ORAL at 09:50

## 2022-08-03 RX ADMIN — DOCUSATE SODIUM SCH MG: 250 CAPSULE, LIQUID FILLED ORAL at 09:50

## 2022-08-03 RX ADMIN — Medication SCH MG: at 09:50

## 2022-08-03 RX ADMIN — HUMAN INSULIN PRN UNIT: 100 INJECTION, SOLUTION SUBCUTANEOUS at 21:44

## 2022-08-03 RX ADMIN — Medication SCH MG: at 09:52

## 2022-08-03 RX ADMIN — Medication SCH MG: at 17:37

## 2022-08-03 RX ADMIN — Medication SCH EACH: at 21:49

## 2022-08-03 RX ADMIN — HEPARIN SODIUM SCH UNITS: 5000 INJECTION INTRAVENOUS; SUBCUTANEOUS at 21:29

## 2022-08-03 RX ADMIN — MINOXIDIL SCH MG: 2.5 TABLET ORAL at 09:00

## 2022-08-03 RX ADMIN — ESCITALOPRAM OXALATE SCH MG: 10 TABLET, FILM COATED ORAL at 09:50

## 2022-08-03 RX ADMIN — Medication SCH EACH: at 06:38

## 2022-08-03 RX ADMIN — Medication SCH EACH: at 17:49

## 2022-08-03 RX ADMIN — FUROSEMIDE SCH MG: 40 TABLET ORAL at 09:51

## 2022-08-03 RX ADMIN — Medication SCH MG: at 09:51

## 2022-08-03 RX ADMIN — GABAPENTIN SCH MG: 100 CAPSULE ORAL at 21:28

## 2022-08-03 RX ADMIN — Medication SCH MG: at 13:21

## 2022-08-03 RX ADMIN — BISOPROLOL FUMARATE SCH MG: 5 TABLET ORAL at 09:00

## 2022-08-03 RX ADMIN — FERROUS SULFATE TAB 325 MG (65 MG ELEMENTAL FE) SCH MG: 325 (65 FE) TAB at 17:37

## 2022-08-03 RX ADMIN — TRAZODONE HYDROCHLORIDE SCH MG: 50 TABLET ORAL at 21:28

## 2022-08-03 RX ADMIN — HEPARIN SODIUM SCH UNITS: 5000 INJECTION INTRAVENOUS; SUBCUTANEOUS at 09:53

## 2022-08-03 RX ADMIN — INSULIN HUMAN PRN UNITS: 100 INJECTION, SOLUTION PARENTERAL at 18:00

## 2022-08-03 RX ADMIN — HUMAN INSULIN PRN UNIT: 100 INJECTION, SOLUTION SUBCUTANEOUS at 12:24

## 2022-08-03 RX ADMIN — FUROSEMIDE SCH MG: 40 TABLET ORAL at 17:36

## 2022-08-03 RX ADMIN — FERROUS SULFATE TAB 325 MG (65 MG ELEMENTAL FE) SCH MG: 325 (65 FE) TAB at 09:51

## 2022-08-03 RX ADMIN — Medication SCH EACH: at 12:19

## 2022-08-03 RX ADMIN — INSULIN HUMAN PRN UNITS: 100 INJECTION, SOLUTION PARENTERAL at 06:40

## 2022-08-03 RX ADMIN — Medication SCH MG: at 09:00

## 2022-08-03 RX ADMIN — ATORVASTATIN CALCIUM SCH MG: 40 TABLET, FILM COATED ORAL at 21:28

## 2022-08-03 RX ADMIN — CLOPIDOGREL BISULFATE SCH MG: 75 TABLET, FILM COATED ORAL at 09:51

## 2022-08-03 RX ADMIN — MONTELUKAST SODIUM SCH MG: 10 TABLET, FILM COATED ORAL at 21:28

## 2022-08-03 NOTE — NUR
MS RN NOTE



ADAMANTLY REFUSED TO BE CHANGED, PER PATIENT NO BM TODAY. I ASKED THE REASON WHY SHE DOESN'T 
WANT TO BE CHANGES, AND EVEN OFFERED PAIN MEDICATION BEFORE HAND IF IT'S BECAUSE OF PAIN. 
PER PATIENT "NO NOT THAT, IT'S BECAUSE I CANNOT MOVE". I TOLD PATIENT THAT IS WHY THE 
NURSING ASSISTANT, LUIS AND I ARE HERE TO HELP HER, PER PATIENT "NOT RIGHT NOW THANK YOU". 
PATIENT GIVEN CLEAN GOWN FOR NOW WHICH SHE STILL TAKES OFF, PREFER TO BE COVERED WITH SHEETS 
AND OFF THE GOWN.

## 2022-08-03 NOTE — NUR
MS RN CLOSING NOTE:



PT REMAINS IN BED, RESTING. A/O X4, ABLE TO MAKE NEEDS KNOWN. ON RA, TOLERATING WELL. 
BREATHING EVEN AND UNLABORED. NO C/O OF PAIN/DISCOMFORT AT THIS TIME. IV SITE IN L HAND G 
#22 AND L FOREARM #18G INTACT AND PATENT. BOTH IV SITES SALINE LOCKED. NO S/S OF 
INFILTRATION. PT. HAS PERFECTO AV FISTULA FOR HD. REINFORCED EDUCATION TO PT. AND FAMILY MEMBERS 
REGARDING DIETARY COMPLIANCE TO HELP MANAGE BLOOD SUGAR. SAFETY MEASURES IN MAINTAINED: BED 
IN LOWEST AND LOCKED POSITION, SIDE RAILS UP X2, CALL LIGHT WITHIN REACH. WILL ENDORSE 
CONTINUITY OF CARE TO NIGHT SHIFT RN.

## 2022-08-03 NOTE — NUR
MS RN NOTE:



PT.'S BG  MG/DL. RECHECKED AND  MG/DL. PT. WAS HAVING SUBWAY SANDWICH EARLIER 
AND JUICE BROUGHT BY FAMILY 2 HOURS AGO. I EDUCATED HER ON IMPORTANCE OF DIET COMPLIANCE AND 
RISKS AND BENEFITS OF NOT FOLLOWING. PT. REFUSED AT FIRST BUT EVENTUALLY AGREED TO THROW 
AWAY THE SODA AND SANDWICH THAT'S LEFT. WILL ADMINISTER 15 UNITS OF REGULAR INSULIN PER 
SLIDING SCALE. DR. MARTINEZ NOTIFIED. WILL CONTINUE TO MONITOR PT. FOR CHANGES AND WILL 
REINFORCE DIET COMPLIANCE.

## 2022-08-03 NOTE — NUR
MS RN CLOSING NOTE



PATIENT D/C'S IN TELE. IN BED, A/OX4. NO S/S OF APPARENT DISTRESS IN ROOM AIR. DENIES PAIN 
NOR DISCOMFORT AT THIS TIME. ARMENTA DRAINING CLEAR DARK YELLOW URINE WITH 850 ML OUTPUT. NO 
FLUIDS RUNNING AT THIS TIME. R.UA AV FISTULA WITH THRILL AND BRUIT FELT. NEEDS ATTENDED. ALL 
SCHEDULED MEDICATIONS ADMINISTERED. SAFETY KEPT IN THE WHOLE SHIFT. WILL ENDORSE TO MORNING 
SHIFT RN FOR CONTINUITY OF CARE.

## 2022-08-03 NOTE — NUR
MS RN OPENING NOTE:



TOOK OVER CARE OF PT.'S CARE AT 0800. RECEIVED PT IN BED AWAKE. A/O X4, ABLE TO MAKE NEEDS 
KNOWN. ON RA, TOLERATING WELL. NO SOB NOTED AT THIS TIME. NO C/O OF PAIN/DISCOMFORT AT THIS 
TIME. IV SITE IN L HAND G #22 AND L FOREARM #18G INTACT AND PATENT. BOTH IV SITES SALINE 
LOCKED. NO S/S OF INFILTRATION. PT. HAS PERFECTO AV FISTULA FOR HD. SAFETY MEASURES IN PLACE: BED 
IN LOWEST AND LOCKED POSITION, SIDE RAILS UP X2, CALL LIGHT WITHIN REACH. WILL CONTINUE TO 
MONITOR PT. FOR ANY CHANGES

## 2022-08-03 NOTE — NUR
TELE RN OPENING NOTE



RECEIVED PATIENT WITH EYES CLOSED, EASY TO AROUSE.AOX4.BRAD WELL IN ROOM AIR.NO SOB/DISTRESS 
NOTED. DENIES PAIN AT THIS TIME.IV ACCESS ON L HAND 22G AND LFA 18G SL/PERFECTO AVF PATENT AND 
INTACT.ARMENTA DRAINING YELLOW URINE. SAFETY IN PLACE. CALL LIGHT WITHIN REACH.WILL CONTINUE 
TO MONITOR.

## 2022-08-04 VITALS — SYSTOLIC BLOOD PRESSURE: 137 MMHG | DIASTOLIC BLOOD PRESSURE: 75 MMHG

## 2022-08-04 VITALS — DIASTOLIC BLOOD PRESSURE: 78 MMHG | SYSTOLIC BLOOD PRESSURE: 153 MMHG

## 2022-08-04 VITALS — DIASTOLIC BLOOD PRESSURE: 73 MMHG | SYSTOLIC BLOOD PRESSURE: 134 MMHG

## 2022-08-04 LAB
BASOPHILS # BLD AUTO: 0.1 K/UL (ref 0–0.2)
BASOPHILS NFR BLD AUTO: 1.3 % (ref 0–2)
BUN SERPL-MCNC: 60 MG/DL (ref 7–18)
CALCIUM SERPL-MCNC: 9.7 MG/DL (ref 8.5–10.1)
CHLORIDE SERPL-SCNC: 95 MMOL/L (ref 98–107)
CO2 SERPL-SCNC: 29 MMOL/L (ref 21–32)
CREAT SERPL-MCNC: 2.9 MG/DL (ref 0.6–1.3)
EOSINOPHIL NFR BLD AUTO: 5 % (ref 0–6)
GLUCOSE SERPL-MCNC: 363 MG/DL (ref 74–106)
HCT VFR BLD AUTO: 35 % (ref 33–45)
HGB BLD-MCNC: 11.1 G/DL (ref 11.5–14.8)
LYMPHOCYTES NFR BLD AUTO: 1.5 K/UL (ref 0.8–4.8)
LYMPHOCYTES NFR BLD AUTO: 16 % (ref 20–44)
MAGNESIUM SERPL-MCNC: 2.1 MG/DL (ref 1.8–2.4)
MCHC RBC AUTO-ENTMCNC: 32 G/DL (ref 31–36)
MCV RBC AUTO: 92 FL (ref 82–100)
MONOCYTES NFR BLD AUTO: 1 K/UL (ref 0.1–1.3)
MONOCYTES NFR BLD AUTO: 11.2 % (ref 2–12)
NEUTROPHILS # BLD AUTO: 6.1 K/UL (ref 1.8–8.9)
NEUTROPHILS NFR BLD AUTO: 66.5 % (ref 43–81)
PHOSPHATE SERPL-MCNC: 4.1 MG/DL (ref 2.5–4.9)
PLATELET # BLD AUTO: 494 K/UL (ref 150–450)
POTASSIUM SERPL-SCNC: 3.1 MMOL/L (ref 3.5–5.1)
RBC # BLD AUTO: 3.77 MIL/UL (ref 4–5.2)
SODIUM SERPL-SCNC: 137 MMOL/L (ref 136–145)
WBC NRBC COR # BLD AUTO: 9.1 K/UL (ref 4.3–11)

## 2022-08-04 RX ADMIN — Medication SCH MG: at 08:30

## 2022-08-04 RX ADMIN — Medication SCH EACH: at 12:37

## 2022-08-04 RX ADMIN — FERROUS SULFATE TAB 325 MG (65 MG ELEMENTAL FE) SCH MG: 325 (65 FE) TAB at 17:17

## 2022-08-04 RX ADMIN — DOCUSATE SODIUM SCH MG: 250 CAPSULE, LIQUID FILLED ORAL at 08:30

## 2022-08-04 RX ADMIN — FUROSEMIDE SCH MG: 40 TABLET ORAL at 17:17

## 2022-08-04 RX ADMIN — HEPARIN SODIUM SCH UNITS: 5000 INJECTION INTRAVENOUS; SUBCUTANEOUS at 08:34

## 2022-08-04 RX ADMIN — Medication SCH MG: at 08:31

## 2022-08-04 RX ADMIN — Medication SCH MG: at 17:16

## 2022-08-04 RX ADMIN — INSULIN HUMAN PRN UNITS: 100 INJECTION, SOLUTION PARENTERAL at 12:40

## 2022-08-04 RX ADMIN — Medication SCH MG: at 13:35

## 2022-08-04 RX ADMIN — MINOXIDIL SCH MG: 2.5 TABLET ORAL at 08:30

## 2022-08-04 RX ADMIN — FERROUS SULFATE TAB 325 MG (65 MG ELEMENTAL FE) SCH MG: 325 (65 FE) TAB at 08:30

## 2022-08-04 RX ADMIN — INSULIN HUMAN PRN UNITS: 100 INJECTION, SOLUTION PARENTERAL at 16:54

## 2022-08-04 RX ADMIN — METOLAZONE SCH MG: 2.5 TABLET ORAL at 17:20

## 2022-08-04 RX ADMIN — FUROSEMIDE SCH MG: 40 TABLET ORAL at 08:30

## 2022-08-04 RX ADMIN — Medication PRN MG: at 17:43

## 2022-08-04 RX ADMIN — Medication SCH EACH: at 06:57

## 2022-08-04 RX ADMIN — Medication SCH MG: at 08:23

## 2022-08-04 RX ADMIN — DOCUSATE SODIUM SCH MG: 250 CAPSULE, LIQUID FILLED ORAL at 17:16

## 2022-08-04 RX ADMIN — BISOPROLOL FUMARATE SCH MG: 5 TABLET ORAL at 08:32

## 2022-08-04 RX ADMIN — CLOPIDOGREL BISULFATE SCH MG: 75 TABLET, FILM COATED ORAL at 08:29

## 2022-08-04 RX ADMIN — MECLIZINE HYDROCLORIDE PRN MG: 25 TABLET ORAL at 15:05

## 2022-08-04 RX ADMIN — ESCITALOPRAM OXALATE SCH MG: 10 TABLET, FILM COATED ORAL at 08:30

## 2022-08-04 RX ADMIN — Medication SCH EACH: at 16:53

## 2022-08-04 RX ADMIN — HUMAN INSULIN PRN UNIT: 100 INJECTION, SOLUTION SUBCUTANEOUS at 06:06

## 2022-08-04 RX ADMIN — PANTOPRAZOLE SODIUM SCH MG: 40 TABLET, DELAYED RELEASE ORAL at 08:23

## 2022-08-04 RX ADMIN — METOLAZONE SCH MG: 2.5 TABLET ORAL at 08:32

## 2022-08-04 NOTE — NUR
MS RN CLOSING NOTE;



PATIENT IN BED SLEEPING BUT EASY TO AROUSED.ROOM AIR BRAD WELL NO SIGN SOB/DISTRESS NOTED.PERFECTO 
AV FISTULA WITH THRILL  AND BRUIT PRESENT.IV ACCESS LFH 18G AND LHAND 22G.PATENT AND 
INTACT.F/C INTACT DRANING LIZZETTE URINE WITH NO ODOR NOTED.ALL NEEDS ATTENDED.DUE MEDS GIVEN 
AS ORDERED.SAFETY MEASURED IN PLACE.CALL LIGHT WITHIN REACH.WILL ENDORSE TO NEXT SHIFT.

## 2022-08-04 NOTE — NUR
MS RN OPENING NOTES



RECEIVED PT IN BED ASLEEP, EASILY AROUSED. A/O X4, ABLE TO MAKE NEEDS KNOWN. ON RA, 
TOLERATING WELL. NO SOB NOTED AT THIS TIME. NOT IN ANY SIGN OF RESPIRATORY DISTRESS. IV SITE 
IN L HAND G #22 AND LFA G #18 INTACT AND PATENT. PERFECTO AV FISTULA INTACT. ARMENTA CATH IN PLACE 
AND DRAINING WELL. SAFETY MEASURES IN PLACE: BED IN LOWEST AND LOCKED POSITION, SIDE RAILS 
UP X2, CALL LIGHT WITHIN REACH. WILL CONTINUE TO MONITOR PT.

## 2022-08-04 NOTE — NUR
MS RN DISCHARGE NOTE

DISCHARGED PATIENT IN STABLE CONDITION WITH STABLE VITAL SIGNS TAKEN AS FOLLOWS: BP-134/73 
MM HG, CT-82 BPM, RR-18, TEMP-98.7, O2 SATURATION OF 97.%. NO SOB NOTED. NO COMPLAINTS OF 
PAIN. DISCHARGE INSTRUCTIONS DONE AND GIVEN TO PATIENT. IV LINES REMOVED AND COVERED WITH 
DRY DRESSING. NO BLEEDING NOTED. ARMENTA CATHETER IN PLACE.  ALL THE BELONGINGS ACCOUNTED FOR. 
PATIENT WAS PICKED UP BY [facility] IN STABLE CONDITION. CHARGE NURSE 
AND MD AWARE OF THE DISCHARGE.

## 2022-08-04 NOTE — NUR
MS RN OPENING NOTES

RECEIVED PATIENT RESTING IN BED; AWAKE, ALERT AND ORIENTED X4. BREATHING EVEN AND 
NONLABORED. ON ROOM AIR, TOLERATING WELL. NOT IN ANY FORM OF RESPIRATORY DISTRESS. DENIES 
ANY PAIN OR DISCOMFORT AS OF THIS TIME. WITH IV ACCESS ON LEFT HAND G#22 AND LEFT FOREARM 
G#18; BOTH ARE PATENT, INTACT AND SALINE LOCKED. WITH ARMENTA CATHETER IN PLACE ATTACHED TO 
UROBAG DRAINING TO A CLEAR YELLOW URINE. ABLE TO MAKE NEEDS KNOWN. SAFETY MEASURES 
IMPLEMENTED: CALL LIGHT AND TABLE WITHIN EASY REACH, SIDE RAILS UP X2, BED IN LOWEST LOCKED 
POSITION. WILL CONTINUE TO MONITOR

## 2022-08-04 NOTE — NUR
RN NOTE



PT C/O LOWER ABDOMINAL PAIN WITH PAIN SCALE LEVEL OF 10/10. MORPHINE 2MG IVP ADMINISTERED AS 
ORDERED PRN Q4HRS. WILL CONTINUE TO MONITOR AND REASSESS PT.

## 2022-08-04 NOTE — NUR
RN NOTE



RECEIVED AN ORDER FROM DR. MARTINEZ TO GIVE POTASSIUM CHLORIDE 20 MEQ PO ONCE DUE TO LOW 
POTASSIUM LEVEL OF 3.1.

## 2022-08-04 NOTE — NUR
RN NOTE



HD STARTED BY DIALYSIS NURSE VIA PERFECTO AV FISTULA. V/S /69, P 80, TEMP 98.0, R 18, SPO2 
96%. WILL CONTINUE TO MONITOR PT.

## 2022-08-04 NOTE — NUR
MS RN CLOSING NOTE



PT IN BED ASLEEP, EASILY AROUSED. A/O X4, ABLE TO MAKE NEEDS KNOWN. ON RA, TOLERATING WELL. 
NO SOB NOTED AT THIS TIME. NOT IN ANY SIGN OF RESPIRATORY DISTRESS. IV SITE IN L HAND G #22 
AND LFA G #18 INTACT AND PATENT. PERFECTO AV FISTULA INTACT. ARMENTA CATH IN PLACE AND DRAINING 
WELL. PT WILL BE DISCHARGE TO [facility]. AWAITING FOR TRANSPORTATION  
AT 2000. ALL BELONGINGS ACCOUNTED FOR. DISCHARGED INSTRUCTIONS AND HEALTH TEACHINGS GIVEN TO 
PT AND PT VERBALIZED UNDERSTANDING. PT REFUSED TO SIGN ALL DISCHARGED PAPERS AND BELONGINGS. 
PT REFUSED BODY ASSESSMENTS AND PHOTOGRAPHS OF ANY SKIN ISSUES. REPORT GIVEN TO YVAN OSMAN 
OF [facility]. ALL NEEDS ATTENDED. KEPT CLEAN AND COMFORTABLE. SAFETY 
MEASURES IN PLACE: BED IN LOWEST AND LOCKED POSITION, SIDE RAILS UP X2, CALL LIGHT WITHIN 
REACH. WILL ENDORSE TO NIGHT SHIFT NURSE IN REGARDS TO DISCHARGE AND [place].

## 2022-08-04 NOTE — NUR
RN NOTE



RECEIVED A CALL FROM [employer] REPORTING CRITICAL LAB VALUE OF GLUCOSE 363. CALLED 
DR. MARTINEZ AND MADE AWARE OF PT'S CRITICAL LAB VALUE WITH NO NEW ORDERS AT THIS TIME.

## 2022-08-04 NOTE — NUR
RN NOTE



HD COMPLETED BY DIALYSIS NURSE VIA PERFECTO AV FISTULA WITH 1.5L OUT. NO ACTIVE BLEEDING NOTED. 
V/S /73, P 83, TEMP 98.2, R 18, SPO2 97%.

## 2023-12-28 ENCOUNTER — HOSPITAL ENCOUNTER (EMERGENCY)
Dept: HOSPITAL 54 - ER | Age: 48
LOS: 1 days | Discharge: HOME | End: 2023-12-29
Payer: MEDICARE

## 2023-12-28 VITALS — HEIGHT: 67 IN | BODY MASS INDEX: 22.76 KG/M2 | WEIGHT: 145 LBS

## 2023-12-28 VITALS — TEMPERATURE: 98.2 F

## 2023-12-28 DIAGNOSIS — G57.12: Primary | ICD-10-CM

## 2023-12-28 DIAGNOSIS — E11.22: ICD-10-CM

## 2023-12-28 DIAGNOSIS — Z99.2: ICD-10-CM

## 2023-12-28 DIAGNOSIS — Z79.4: ICD-10-CM

## 2023-12-28 DIAGNOSIS — I12.9: ICD-10-CM

## 2023-12-28 DIAGNOSIS — N18.9: ICD-10-CM

## 2023-12-28 PROCEDURE — 99285 EMERGENCY DEPT VISIT HI MDM: CPT

## 2023-12-28 PROCEDURE — 93971 EXTREMITY STUDY: CPT

## 2023-12-28 PROCEDURE — 96372 THER/PROPH/DIAG INJ SC/IM: CPT

## 2023-12-28 RX ADMIN — Medication ONE MG: at 23:37

## 2023-12-29 VITALS — OXYGEN SATURATION: 98 % | DIASTOLIC BLOOD PRESSURE: 77 MMHG | SYSTOLIC BLOOD PRESSURE: 129 MMHG

## 2024-02-08 NOTE — NUR
rn notes/accu check 216:

pt's blood sugar result is 216, pt currently on npo for ct scan of abdomen pelvis today, 
doesnt know specific time, pt unable to eat, per pt she would rather wait until procedure is 
done and will have her meal tray and be mikey to eat before receiving any insulin. education 
provided to pt. pt a/o x4. 4

## 2024-02-27 ENCOUNTER — HOSPITAL ENCOUNTER (EMERGENCY)
Dept: HOSPITAL 54 - ER | Age: 49
Discharge: HOME | End: 2024-02-27
Payer: MEDICARE

## 2024-02-27 VITALS — DIASTOLIC BLOOD PRESSURE: 75 MMHG | OXYGEN SATURATION: 97 % | SYSTOLIC BLOOD PRESSURE: 145 MMHG

## 2024-02-27 VITALS — WEIGHT: 280 LBS | BODY MASS INDEX: 46.65 KG/M2 | HEIGHT: 65 IN

## 2024-02-27 VITALS — TEMPERATURE: 98.2 F

## 2024-02-27 DIAGNOSIS — I12.9: ICD-10-CM

## 2024-02-27 DIAGNOSIS — L03.115: Primary | ICD-10-CM

## 2024-02-27 DIAGNOSIS — Z79.899: ICD-10-CM

## 2024-02-27 DIAGNOSIS — Z98.890: ICD-10-CM

## 2024-02-27 DIAGNOSIS — N18.9: ICD-10-CM

## 2024-02-27 DIAGNOSIS — Z79.82: ICD-10-CM

## 2024-02-27 DIAGNOSIS — Z88.0: ICD-10-CM

## 2024-02-27 DIAGNOSIS — D64.9: ICD-10-CM

## 2024-02-27 LAB
BASOPHILS # BLD AUTO: 0.1 K/UL (ref 0–0.2)
BASOPHILS NFR BLD AUTO: 0.8 % (ref 0–2)
BUN SERPL-MCNC: 71 MG/DL (ref 7–18)
CALCIUM SERPL-MCNC: 8.8 MG/DL (ref 8.5–10.1)
CHLORIDE SERPL-SCNC: 95 MMOL/L (ref 98–107)
CO2 SERPL-SCNC: 28 MMOL/L (ref 21–32)
CREAT SERPL-MCNC: 7 MG/DL (ref 0.6–1.3)
EOSINOPHIL # BLD AUTO: 0.5 K/UL (ref 0–0.7)
EOSINOPHIL NFR BLD AUTO: 4.5 % (ref 0–6)
ERYTHROCYTE [DISTWIDTH] IN BLOOD BY AUTOMATED COUNT: 18.5 % (ref 11.5–15)
GLUCOSE SERPL-MCNC: 268 MG/DL (ref 74–106)
HCT VFR BLD AUTO: 25 % (ref 33–45)
HGB BLD-MCNC: 7.9 G/DL (ref 11.5–14.8)
LYMPHOCYTES NFR BLD AUTO: 0.8 K/UL (ref 0.8–4.8)
LYMPHOCYTES NFR BLD AUTO: 6.6 % (ref 20–44)
MCH RBC QN AUTO: 30 PG (ref 26–33)
MCHC RBC AUTO-ENTMCNC: 32 G/DL (ref 31–36)
MCV RBC AUTO: 94 FL (ref 82–100)
MONOCYTES NFR BLD AUTO: 0.6 K/UL (ref 0.1–1.3)
MONOCYTES NFR BLD AUTO: 5 % (ref 2–12)
NEUTROPHILS # BLD AUTO: 9.8 K/UL (ref 1.8–8.9)
NEUTROPHILS NFR BLD AUTO: 83.1 % (ref 43–81)
PLATELET # BLD AUTO: 307 K/UL (ref 150–450)
POTASSIUM SERPL-SCNC: 5.6 MMOL/L (ref 3.5–5.1)
RBC # BLD AUTO: 2.6 MIL/UL (ref 4–5.2)
SODIUM SERPL-SCNC: 134 MMOL/L (ref 136–145)
WBC NRBC COR # BLD AUTO: 11.8 K/UL (ref 4.3–11)

## 2024-02-27 RX ADMIN — MERCAPTOPURINE ONE MG: 20 SUSPENSION ORAL at 20:42

## 2024-02-27 RX ADMIN — SULFAMETHOXAZOLE AND TRIMETHOPRIM ONE UDTAB: 800; 160 TABLET ORAL at 20:42

## 2024-04-06 ENCOUNTER — HOSPITAL ENCOUNTER (INPATIENT)
Dept: HOSPITAL 54 - ER | Age: 49
LOS: 4 days | Discharge: HOME HEALTH | DRG: 623 | End: 2024-04-10
Attending: NURSE PRACTITIONER | Admitting: INTERNAL MEDICINE
Payer: MEDICARE

## 2024-04-06 VITALS — TEMPERATURE: 98.3 F | DIASTOLIC BLOOD PRESSURE: 85 MMHG | OXYGEN SATURATION: 100 % | SYSTOLIC BLOOD PRESSURE: 179 MMHG

## 2024-04-06 VITALS — HEIGHT: 64 IN | BODY MASS INDEX: 33.29 KG/M2 | WEIGHT: 195 LBS

## 2024-04-06 DIAGNOSIS — I12.0: ICD-10-CM

## 2024-04-06 DIAGNOSIS — E11.51: ICD-10-CM

## 2024-04-06 DIAGNOSIS — Z79.84: ICD-10-CM

## 2024-04-06 DIAGNOSIS — E11.622: Primary | ICD-10-CM

## 2024-04-06 DIAGNOSIS — N18.6: ICD-10-CM

## 2024-04-06 DIAGNOSIS — Z99.2: ICD-10-CM

## 2024-04-06 DIAGNOSIS — I70.238: ICD-10-CM

## 2024-04-06 DIAGNOSIS — E78.5: ICD-10-CM

## 2024-04-06 DIAGNOSIS — M81.0: ICD-10-CM

## 2024-04-06 DIAGNOSIS — Z79.4: ICD-10-CM

## 2024-04-06 DIAGNOSIS — L97.818: ICD-10-CM

## 2024-04-06 DIAGNOSIS — Z86.73: ICD-10-CM

## 2024-04-06 DIAGNOSIS — I25.10: ICD-10-CM

## 2024-04-06 DIAGNOSIS — L03.115: ICD-10-CM

## 2024-04-06 DIAGNOSIS — Z87.891: ICD-10-CM

## 2024-04-06 DIAGNOSIS — E44.1: ICD-10-CM

## 2024-04-06 DIAGNOSIS — D64.9: ICD-10-CM

## 2024-04-06 DIAGNOSIS — Z95.1: ICD-10-CM

## 2024-04-06 DIAGNOSIS — E11.22: ICD-10-CM

## 2024-04-06 LAB
ALBUMIN SERPL BCP-MCNC: 3.3 G/DL (ref 3.4–5)
ALP SERPL-CCNC: 118 U/L (ref 46–116)
ALT SERPL W P-5'-P-CCNC: 14 U/L (ref 12–78)
APTT PPP: 29.3 SEC (ref 24.3–34.3)
AST SERPL W P-5'-P-CCNC: 13 U/L (ref 15–37)
BASOPHILS # BLD AUTO: 0.1 K/UL (ref 0–0.2)
BASOPHILS NFR BLD AUTO: 0.7 % (ref 0–2)
BILIRUB DIRECT SERPL-MCNC: 0.1 MG/DL (ref 0–0.2)
BILIRUB SERPL-MCNC: 0.3 MG/DL (ref 0.2–1)
BUN SERPL-MCNC: 60 MG/DL (ref 7–18)
CALCIUM SERPL-MCNC: 9.1 MG/DL (ref 8.5–10.1)
CHLORIDE SERPL-SCNC: 97 MMOL/L (ref 98–107)
CO2 SERPL-SCNC: 26 MMOL/L (ref 21–32)
CREAT SERPL-MCNC: 5.8 MG/DL (ref 0.6–1.3)
EOSINOPHIL # BLD AUTO: 0.7 K/UL (ref 0–0.7)
EOSINOPHIL NFR BLD AUTO: 5.9 % (ref 0–6)
ERYTHROCYTE [DISTWIDTH] IN BLOOD BY AUTOMATED COUNT: 17.5 % (ref 11.5–15)
GLUCOSE SERPL-MCNC: 81 MG/DL (ref 74–106)
HCT VFR BLD AUTO: 24 % (ref 33–45)
HGB BLD-MCNC: 7.5 G/DL (ref 11.5–14.8)
INR PPP: 1.03 (ref 0.91–1.1)
LACTATE SERPL-SCNC: 0.6 MMOL/L (ref 0.4–2)
LYMPHOCYTES NFR BLD AUTO: 1.1 K/UL (ref 0.8–4.8)
LYMPHOCYTES NFR BLD AUTO: 9 % (ref 20–44)
MCH RBC QN AUTO: 30 PG (ref 26–33)
MCHC RBC AUTO-ENTMCNC: 31 G/DL (ref 31–36)
MCV RBC AUTO: 97 FL (ref 82–100)
MONOCYTES NFR BLD AUTO: 0.9 K/UL (ref 0.1–1.3)
MONOCYTES NFR BLD AUTO: 7.2 % (ref 2–12)
NEUTROPHILS # BLD AUTO: 9.3 K/UL (ref 1.8–8.9)
NEUTROPHILS NFR BLD AUTO: 77.2 % (ref 43–81)
PLATELET # BLD AUTO: 375 K/UL (ref 150–450)
PLATELET BLD QL SMEAR: ADEQUATE
POTASSIUM SERPL-SCNC: 4.3 MMOL/L (ref 3.5–5.1)
PROT SERPL-MCNC: 7.8 G/DL (ref 6.4–8.2)
PROTHROMBIN TIME: 10.9 SECS (ref 9.2–11.1)
RBC # BLD AUTO: 2.48 MIL/UL (ref 4–5.2)
SODIUM SERPL-SCNC: 138 MMOL/L (ref 136–145)
WBC NRBC COR # BLD AUTO: 12.1 K/UL (ref 4.3–11)

## 2024-04-06 PROCEDURE — G0378 HOSPITAL OBSERVATION PER HR: HCPCS

## 2024-04-06 PROCEDURE — A4223 INFUSION SUPPLIES W/O PUMP: HCPCS

## 2024-04-06 PROCEDURE — A4216 STERILE WATER/SALINE, 10 ML: HCPCS

## 2024-04-06 RX ADMIN — LEVOFLOXACIN ONE MLS/HR: 750 INJECTION, SOLUTION INTRAVENOUS at 20:00

## 2024-04-06 RX ADMIN — DEXTROSE MONOHYDRATE ONE MLS/HR: 50 INJECTION, SOLUTION INTRAVENOUS at 20:45

## 2024-04-07 VITALS — TEMPERATURE: 98.2 F | DIASTOLIC BLOOD PRESSURE: 75 MMHG | OXYGEN SATURATION: 100 % | SYSTOLIC BLOOD PRESSURE: 164 MMHG

## 2024-04-07 VITALS — SYSTOLIC BLOOD PRESSURE: 135 MMHG | DIASTOLIC BLOOD PRESSURE: 70 MMHG | TEMPERATURE: 97.9 F | OXYGEN SATURATION: 98 %

## 2024-04-07 VITALS — OXYGEN SATURATION: 100 %

## 2024-04-07 VITALS — OXYGEN SATURATION: 99 %

## 2024-04-07 VITALS — TEMPERATURE: 98.2 F | SYSTOLIC BLOOD PRESSURE: 198 MMHG | DIASTOLIC BLOOD PRESSURE: 89 MMHG | OXYGEN SATURATION: 100 %

## 2024-04-07 PROCEDURE — 5A1D70Z PERFORMANCE OF URINARY FILTRATION, INTERMITTENT, LESS THAN 6 HOURS PER DAY: ICD-10-PCS | Performed by: INTERNAL MEDICINE

## 2024-04-07 RX ADMIN — MONTELUKAST SODIUM SCH MG: 10 TABLET, FILM COATED ORAL at 08:50

## 2024-04-07 RX ADMIN — Medication SCH MG: at 08:43

## 2024-04-07 RX ADMIN — Medication SCH MG: at 08:50

## 2024-04-07 RX ADMIN — ALBUTEROL SULFATE SCH MG: 2.5 SOLUTION RESPIRATORY (INHALATION) at 04:24

## 2024-04-07 RX ADMIN — METOLAZONE SCH MG: 2.5 TABLET ORAL at 08:52

## 2024-04-07 RX ADMIN — Medication SCH EACH: at 21:41

## 2024-04-07 RX ADMIN — BISOPROLOL FUMARATE SCH MG: 5 TABLET ORAL at 08:51

## 2024-04-07 RX ADMIN — CLOPIDOGREL BISULFATE SCH MG: 75 TABLET, FILM COATED ORAL at 08:53

## 2024-04-07 RX ADMIN — DEXTROSE MONOHYDRATE ONE MLS/HR: 50 INJECTION, SOLUTION INTRAVENOUS at 18:54

## 2024-04-07 RX ADMIN — ATORVASTATIN CALCIUM SCH MG: 40 TABLET, FILM COATED ORAL at 21:35

## 2024-04-07 RX ADMIN — DOCUSATE SODIUM SCH MG: 250 CAPSULE, LIQUID FILLED ORAL at 08:46

## 2024-04-07 RX ADMIN — Medication PRN MG: at 02:29

## 2024-04-07 RX ADMIN — Medication SCH MG: at 17:00

## 2024-04-07 RX ADMIN — MINOXIDIL SCH MG: 2.5 TABLET ORAL at 08:49

## 2024-04-07 RX ADMIN — HYDROCODONE BITARTRATE AND ACETAMINOPHEN PRN TAB: 10; 325 TABLET ORAL at 00:10

## 2024-04-07 RX ADMIN — HUMAN INSULIN PRN UNIT: 100 INJECTION, SOLUTION SUBCUTANEOUS at 21:42

## 2024-04-07 RX ADMIN — PANTOPRAZOLE SODIUM SCH MG: 40 TABLET, DELAYED RELEASE ORAL at 08:43

## 2024-04-07 RX ADMIN — Medication SCH MG: at 08:46

## 2024-04-07 RX ADMIN — INSULIN GLARGINE SCH UNIT: 100 INJECTION, SOLUTION SUBCUTANEOUS at 21:42

## 2024-04-07 RX ADMIN — GABAPENTIN SCH MG: 100 CAPSULE ORAL at 21:36

## 2024-04-07 RX ADMIN — Medication SCH MG: at 08:47

## 2024-04-07 RX ADMIN — ACETAMINOPHEN PRN MG: 325 TABLET ORAL at 00:35

## 2024-04-07 RX ADMIN — ESCITALOPRAM OXALATE SCH MG: 10 TABLET, FILM COATED ORAL at 08:48

## 2024-04-07 RX ADMIN — CLONIDINE HYDROCHLORIDE SCH MG: 0.1 TABLET ORAL at 08:56

## 2024-04-07 RX ADMIN — FERROUS SULFATE TAB 325 MG (65 MG ELEMENTAL FE) SCH MG: 325 (65 FE) TAB at 08:47

## 2024-04-07 RX ADMIN — GLIMEPIRIDE SCH MG: 4 TABLET ORAL at 08:54

## 2024-04-07 RX ADMIN — HEPARIN SODIUM SCH UNITS: 5000 INJECTION INTRAVENOUS; SUBCUTANEOUS at 09:00

## 2024-04-07 RX ADMIN — FUROSEMIDE SCH MG: 40 TABLET ORAL at 08:45

## 2024-04-08 VITALS — DIASTOLIC BLOOD PRESSURE: 78 MMHG | SYSTOLIC BLOOD PRESSURE: 134 MMHG | OXYGEN SATURATION: 100 % | TEMPERATURE: 98.8 F

## 2024-04-08 VITALS — DIASTOLIC BLOOD PRESSURE: 57 MMHG | OXYGEN SATURATION: 99 % | TEMPERATURE: 98.2 F | SYSTOLIC BLOOD PRESSURE: 108 MMHG

## 2024-04-08 VITALS — OXYGEN SATURATION: 98 % | SYSTOLIC BLOOD PRESSURE: 129 MMHG | TEMPERATURE: 98.4 F | DIASTOLIC BLOOD PRESSURE: 64 MMHG

## 2024-04-08 VITALS — OXYGEN SATURATION: 100 %

## 2024-04-08 VITALS — OXYGEN SATURATION: 99 %

## 2024-04-08 LAB
ALBUMIN SERPL BCP-MCNC: 3 G/DL (ref 3.4–5)
ALP SERPL-CCNC: 110 U/L (ref 46–116)
ALT SERPL W P-5'-P-CCNC: 17 U/L (ref 12–78)
ANISOCYTOSIS BLD QL: (no result)
AST SERPL W P-5'-P-CCNC: 11 U/L (ref 15–37)
BASOPHILS # BLD AUTO: 0.1 K/UL (ref 0–0.2)
BASOPHILS NFR BLD AUTO: 0.7 % (ref 0–2)
BILIRUB SERPL-MCNC: 0.3 MG/DL (ref 0.2–1)
BUN SERPL-MCNC: 58 MG/DL (ref 7–18)
CALCIUM SERPL-MCNC: 8.9 MG/DL (ref 8.5–10.1)
CHLORIDE SERPL-SCNC: 98 MMOL/L (ref 98–107)
CO2 SERPL-SCNC: 29 MMOL/L (ref 21–32)
CREAT SERPL-MCNC: 6.2 MG/DL (ref 0.6–1.3)
EOSINOPHIL # BLD AUTO: 0.5 K/UL (ref 0–0.7)
EOSINOPHIL NFR BLD AUTO: 4.5 % (ref 0–6)
EOSINOPHIL NFR BLD MANUAL: 2 % (ref 0–4)
ERYTHROCYTE [DISTWIDTH] IN BLOOD BY AUTOMATED COUNT: 17 % (ref 11.5–15)
GLUCOSE SERPL-MCNC: 124 MG/DL (ref 74–106)
HCT VFR BLD AUTO: 23 % (ref 33–45)
HGB BLD-MCNC: 7.3 G/DL (ref 11.5–14.8)
LYMPHOCYTES NFR BLD AUTO: 0.9 K/UL (ref 0.8–4.8)
LYMPHOCYTES NFR BLD AUTO: 8.9 % (ref 20–44)
LYMPHOCYTES NFR BLD MANUAL: 9 % (ref 16–48)
MACROCYTES BLD QL: (no result)
MAGNESIUM SERPL-MCNC: 2.2 MG/DL (ref 1.8–2.4)
MCH RBC QN AUTO: 31 PG (ref 26–33)
MCHC RBC AUTO-ENTMCNC: 32 G/DL (ref 31–36)
MCV RBC AUTO: 95 FL (ref 82–100)
MONOCYTES NFR BLD AUTO: 0.8 K/UL (ref 0.1–1.3)
MONOCYTES NFR BLD AUTO: 7.7 % (ref 2–12)
MONOCYTES NFR BLD MANUAL: 4 % (ref 0–11)
NEUTROPHILS # BLD AUTO: 8 K/UL (ref 1.8–8.9)
NEUTROPHILS NFR BLD AUTO: 78.2 % (ref 43–81)
NEUTS BAND NFR BLD MANUAL: 1 % (ref 0–5)
NEUTS SEG NFR BLD MANUAL: 84 % (ref 42–76)
OVALOCYTES BLD QL SMEAR: (no result)
PHOSPHATE SERPL-MCNC: 7.1 MG/DL (ref 2.5–4.9)
PLATELET # BLD AUTO: 351 K/UL (ref 150–450)
PLATELET BLD QL SMEAR: ADEQUATE
POTASSIUM SERPL-SCNC: 4.7 MMOL/L (ref 3.5–5.1)
PROT SERPL-MCNC: 7.2 G/DL (ref 6.4–8.2)
RBC # BLD AUTO: 2.38 MIL/UL (ref 4–5.2)
SODIUM SERPL-SCNC: 137 MMOL/L (ref 136–145)
STOMATOCYTES BLD QL SMEAR: (no result)
WBC NRBC COR # BLD AUTO: 10.2 K/UL (ref 4.3–11)

## 2024-04-08 PROCEDURE — 0JBN0ZZ EXCISION OF RIGHT LOWER LEG SUBCUTANEOUS TISSUE AND FASCIA, OPEN APPROACH: ICD-10-PCS | Performed by: PODIATRIST

## 2024-04-08 RX ADMIN — Medication SCH MLS/HR: at 21:50

## 2024-04-08 RX ADMIN — LIDOCAINE HYDROCHLORIDE ONE MG: 10 INJECTION, SOLUTION INFILTRATION; PERINEURAL at 12:07

## 2024-04-08 RX ADMIN — INSULIN HUMAN PRN UNITS: 100 INJECTION, SOLUTION PARENTERAL at 06:35

## 2024-04-09 VITALS — OXYGEN SATURATION: 99 %

## 2024-04-09 VITALS — TEMPERATURE: 97.6 F | OXYGEN SATURATION: 98 % | DIASTOLIC BLOOD PRESSURE: 100 MMHG | SYSTOLIC BLOOD PRESSURE: 156 MMHG

## 2024-04-09 VITALS — DIASTOLIC BLOOD PRESSURE: 74 MMHG | SYSTOLIC BLOOD PRESSURE: 145 MMHG

## 2024-04-09 VITALS — SYSTOLIC BLOOD PRESSURE: 145 MMHG | TEMPERATURE: 98.6 F | DIASTOLIC BLOOD PRESSURE: 72 MMHG | OXYGEN SATURATION: 98 %

## 2024-04-09 VITALS — DIASTOLIC BLOOD PRESSURE: 78 MMHG | SYSTOLIC BLOOD PRESSURE: 124 MMHG | OXYGEN SATURATION: 98 %

## 2024-04-09 RX ADMIN — ALBUTEROL SULFATE PRN MG: 2.5 SOLUTION RESPIRATORY (INHALATION) at 12:28

## 2024-04-10 VITALS — OXYGEN SATURATION: 98 %

## 2024-05-16 ENCOUNTER — HOSPITAL ENCOUNTER (EMERGENCY)
Dept: HOSPITAL 54 - ER | Age: 49
Discharge: HOME | End: 2024-05-16
Payer: MEDICARE

## 2024-05-16 VITALS — HEIGHT: 64 IN | BODY MASS INDEX: 42.68 KG/M2 | WEIGHT: 250 LBS

## 2024-05-16 VITALS — OXYGEN SATURATION: 98 % | SYSTOLIC BLOOD PRESSURE: 151 MMHG | DIASTOLIC BLOOD PRESSURE: 82 MMHG | TEMPERATURE: 97.8 F

## 2024-05-16 DIAGNOSIS — Z79.4: ICD-10-CM

## 2024-05-16 DIAGNOSIS — Z79.899: ICD-10-CM

## 2024-05-16 DIAGNOSIS — E11.22: ICD-10-CM

## 2024-05-16 DIAGNOSIS — Z88.0: ICD-10-CM

## 2024-05-16 DIAGNOSIS — I82.401: Primary | ICD-10-CM

## 2024-05-16 DIAGNOSIS — M79.89: ICD-10-CM

## 2024-05-16 DIAGNOSIS — I12.9: ICD-10-CM

## 2024-05-16 DIAGNOSIS — N18.9: ICD-10-CM

## 2024-05-16 LAB
ALBUMIN SERPL BCP-MCNC: 3.6 G/DL (ref 3.4–5)
ALP SERPL-CCNC: 116 U/L (ref 46–116)
ALT SERPL W P-5'-P-CCNC: 30 U/L (ref 12–78)
AST SERPL W P-5'-P-CCNC: 21 U/L (ref 15–37)
BASOPHILS # BLD AUTO: 0.1 K/UL (ref 0–0.2)
BASOPHILS NFR BLD AUTO: 0.7 % (ref 0–2)
BILIRUB SERPL-MCNC: 0.4 MG/DL (ref 0.2–1)
BUN SERPL-MCNC: 57 MG/DL (ref 7–18)
CALCIUM SERPL-MCNC: 9.1 MG/DL (ref 8.5–10.1)
CHLORIDE SERPL-SCNC: 93 MMOL/L (ref 98–107)
CO2 SERPL-SCNC: 29 MMOL/L (ref 21–32)
CREAT SERPL-MCNC: 5.5 MG/DL (ref 0.6–1.3)
EOSINOPHIL # BLD AUTO: 0.4 K/UL (ref 0–0.7)
EOSINOPHIL NFR BLD AUTO: 3.8 % (ref 0–6)
ERYTHROCYTE [DISTWIDTH] IN BLOOD BY AUTOMATED COUNT: 17.6 % (ref 11.5–15)
GLUCOSE SERPL-MCNC: 139 MG/DL (ref 74–106)
HCT VFR BLD AUTO: 26 % (ref 33–45)
HGB BLD-MCNC: 8.2 G/DL (ref 11.5–14.8)
LYMPHOCYTES NFR BLD AUTO: 0.9 K/UL (ref 0.8–4.8)
LYMPHOCYTES NFR BLD AUTO: 8.9 % (ref 20–44)
MCH RBC QN AUTO: 30 PG (ref 26–33)
MCHC RBC AUTO-ENTMCNC: 31 G/DL (ref 31–36)
MCV RBC AUTO: 97 FL (ref 82–100)
MONOCYTES NFR BLD AUTO: 0.6 K/UL (ref 0.1–1.3)
MONOCYTES NFR BLD AUTO: 5.3 % (ref 2–12)
NEUTROPHILS # BLD AUTO: 8.6 K/UL (ref 1.8–8.9)
NEUTROPHILS NFR BLD AUTO: 81.3 % (ref 43–81)
PLATELET # BLD AUTO: 367 K/UL (ref 150–450)
POTASSIUM SERPL-SCNC: 3.9 MMOL/L (ref 3.5–5.1)
PROT SERPL-MCNC: 8.4 G/DL (ref 6.4–8.2)
RBC # BLD AUTO: 2.74 MIL/UL (ref 4–5.2)
SODIUM SERPL-SCNC: 133 MMOL/L (ref 136–145)
WBC NRBC COR # BLD AUTO: 10.6 K/UL (ref 4.3–11)

## 2024-08-22 ENCOUNTER — HOSPITAL ENCOUNTER (INPATIENT)
Dept: HOSPITAL 54 - ER | Age: 49
LOS: 7 days | Discharge: HOME HEALTH | DRG: 871 | End: 2024-08-29
Attending: INTERNAL MEDICINE | Admitting: NURSE PRACTITIONER
Payer: MEDICARE

## 2024-08-22 VITALS — SYSTOLIC BLOOD PRESSURE: 184 MMHG | OXYGEN SATURATION: 97 % | TEMPERATURE: 97.2 F | DIASTOLIC BLOOD PRESSURE: 87 MMHG

## 2024-08-22 VITALS — WEIGHT: 267.06 LBS | BODY MASS INDEX: 45.59 KG/M2 | HEIGHT: 64 IN

## 2024-08-22 DIAGNOSIS — D64.9: ICD-10-CM

## 2024-08-22 DIAGNOSIS — E87.5: ICD-10-CM

## 2024-08-22 DIAGNOSIS — M10.9: ICD-10-CM

## 2024-08-22 DIAGNOSIS — Z79.51: ICD-10-CM

## 2024-08-22 DIAGNOSIS — A41.9: Primary | ICD-10-CM

## 2024-08-22 DIAGNOSIS — Z95.1: ICD-10-CM

## 2024-08-22 DIAGNOSIS — Z86.73: ICD-10-CM

## 2024-08-22 DIAGNOSIS — Z99.2: ICD-10-CM

## 2024-08-22 DIAGNOSIS — N18.6: ICD-10-CM

## 2024-08-22 DIAGNOSIS — I50.9: ICD-10-CM

## 2024-08-22 DIAGNOSIS — R60.1: ICD-10-CM

## 2024-08-22 DIAGNOSIS — Z99.81: ICD-10-CM

## 2024-08-22 DIAGNOSIS — Z79.82: ICD-10-CM

## 2024-08-22 DIAGNOSIS — J44.0: ICD-10-CM

## 2024-08-22 DIAGNOSIS — Z88.0: ICD-10-CM

## 2024-08-22 DIAGNOSIS — J96.10: ICD-10-CM

## 2024-08-22 DIAGNOSIS — I89.0: ICD-10-CM

## 2024-08-22 DIAGNOSIS — L03.115: ICD-10-CM

## 2024-08-22 DIAGNOSIS — M89.8X9: ICD-10-CM

## 2024-08-22 DIAGNOSIS — Z79.52: ICD-10-CM

## 2024-08-22 DIAGNOSIS — E66.01: ICD-10-CM

## 2024-08-22 DIAGNOSIS — Z79.84: ICD-10-CM

## 2024-08-22 DIAGNOSIS — Z87.891: ICD-10-CM

## 2024-08-22 DIAGNOSIS — R74.01: ICD-10-CM

## 2024-08-22 DIAGNOSIS — E87.1: ICD-10-CM

## 2024-08-22 DIAGNOSIS — J15.9: ICD-10-CM

## 2024-08-22 DIAGNOSIS — I87.2: ICD-10-CM

## 2024-08-22 DIAGNOSIS — L97.818: ICD-10-CM

## 2024-08-22 DIAGNOSIS — B96.5: ICD-10-CM

## 2024-08-22 DIAGNOSIS — Z79.4: ICD-10-CM

## 2024-08-22 DIAGNOSIS — I25.10: ICD-10-CM

## 2024-08-22 DIAGNOSIS — E11.22: ICD-10-CM

## 2024-08-22 DIAGNOSIS — I87.311: ICD-10-CM

## 2024-08-22 DIAGNOSIS — I13.2: ICD-10-CM

## 2024-08-22 DIAGNOSIS — L88: ICD-10-CM

## 2024-08-22 DIAGNOSIS — E78.5: ICD-10-CM

## 2024-08-22 DIAGNOSIS — Z53.20: ICD-10-CM

## 2024-08-22 LAB
ALBUMIN SERPL BCP-MCNC: 3.4 G/DL (ref 3.4–5)
ALP SERPL-CCNC: 202 U/L (ref 46–116)
ALT SERPL W P-5'-P-CCNC: 83 U/L (ref 12–78)
APPEARANCE UR: (no result)
APTT PPP: 25.4 SEC (ref 24.3–34.3)
AST SERPL W P-5'-P-CCNC: 76 U/L (ref 15–37)
BACTERIA UR CULT: NO
BASOPHILS # BLD AUTO: 0.1 K/UL (ref 0–0.2)
BASOPHILS NFR BLD AUTO: 0.9 % (ref 0–2)
BILIRUB DIRECT SERPL-MCNC: 0.1 MG/DL (ref 0–0.2)
BILIRUB SERPL-MCNC: 0.4 MG/DL (ref 0.2–1)
BILIRUB UR QL STRIP: NEGATIVE
BUN SERPL-MCNC: 47 MG/DL (ref 7–18)
CALCIUM SERPL-MCNC: 9.3 MG/DL (ref 8.5–10.1)
CHLORIDE SERPL-SCNC: 93 MMOL/L (ref 98–107)
CO2 SERPL-SCNC: 28 MMOL/L (ref 21–32)
COLOR UR: (no result)
CREAT SERPL-MCNC: 5 MG/DL (ref 0.6–1.3)
DEPRECATED SQUAMOUS URNS QL MICRO: (no result) /HPF
EOSINOPHIL # BLD AUTO: 0.3 K/UL (ref 0–0.7)
EOSINOPHIL NFR BLD AUTO: 2.3 % (ref 0–6)
ERYTHROCYTE [DISTWIDTH] IN BLOOD BY AUTOMATED COUNT: 20.9 % (ref 11.5–15)
GLUCOSE SERPL-MCNC: 168 MG/DL (ref 74–106)
GLUCOSE UR STRIP-MCNC: NEGATIVE MG/DL
HCT VFR BLD AUTO: 26 % (ref 33–45)
HGB BLD-MCNC: 7.9 G/DL (ref 11.5–14.8)
HGB UR QL STRIP: NEGATIVE ERY/UL
INR PPP: 1 (ref 0.91–1.1)
KETONES UR STRIP-MCNC: NEGATIVE MG/DL
LACTATE SERPL-SCNC: 1.1 MMOL/L (ref 0.4–2)
LEUKOCYTE ESTERASE UR QL STRIP: NEGATIVE
LYMPHOCYTES NFR BLD AUTO: 0.5 K/UL (ref 0.8–4.8)
LYMPHOCYTES NFR BLD AUTO: 3.9 % (ref 20–44)
MCH RBC QN AUTO: 29 PG (ref 26–33)
MCHC RBC AUTO-ENTMCNC: 31 G/DL (ref 31–36)
MCV RBC AUTO: 95 FL (ref 82–100)
MONOCYTES NFR BLD AUTO: 0.8 K/UL (ref 0.1–1.3)
MONOCYTES NFR BLD AUTO: 6.1 % (ref 2–12)
NEUTROPHILS # BLD AUTO: 12.1 K/UL (ref 1.8–8.9)
NEUTROPHILS NFR BLD AUTO: 86.8 % (ref 43–81)
NITRITE UR QL STRIP: NEGATIVE
PH UR STRIP: 5.5 [PH] (ref 5–8)
PLATELET # BLD AUTO: 375 K/UL (ref 150–450)
POTASSIUM SERPL-SCNC: 5.2 MMOL/L (ref 3.5–5.1)
PROT SERPL-MCNC: 7.9 G/DL (ref 6.4–8.2)
PROT UR QL STRIP: (no result) MG/DL
PROTHROMBIN TIME: 10.3 SECS (ref 9.2–11.1)
RBC # BLD AUTO: 2.73 MIL/UL (ref 4–5.2)
RBC #/AREA URNS HPF: (no result) /HPF (ref 0–2)
SODIUM SERPL-SCNC: 129 MMOL/L (ref 136–145)
SP GR UR STRIP: 1.02 (ref 1–1.03)
UROBILINOGEN UR STRIP-MCNC: 0.2 EU/DL
WBC #/AREA URNS HPF: (no result) /HPF
WBC #/AREA URNS HPF: (no result) /HPF (ref 0–3)
WBC NRBC COR # BLD AUTO: 14 K/UL (ref 4.3–11)

## 2024-08-22 PROCEDURE — P9016 RBC LEUKOCYTES REDUCED: HCPCS

## 2024-08-22 PROCEDURE — A6403 STERILE GAUZE>16 <= 48 SQ IN: HCPCS

## 2024-08-22 PROCEDURE — A4223 INFUSION SUPPLIES W/O PUMP: HCPCS

## 2024-08-22 PROCEDURE — A6253 ABSORPT DRG > 48 SQ IN W/O B: HCPCS

## 2024-08-22 PROCEDURE — G0378 HOSPITAL OBSERVATION PER HR: HCPCS

## 2024-08-22 PROCEDURE — A6207 CONTACT LAYER >16<= 48 SQ IN: HCPCS

## 2024-08-22 RX ADMIN — DEXTROSE MONOHYDRATE ONE MLS/HR: 50 INJECTION, SOLUTION INTRAVENOUS at 21:00

## 2024-08-22 RX ADMIN — SODIUM POLYSTYRENE SULFONATE ONE G: 15 SUSPENSION ORAL; RECTAL at 21:29

## 2024-08-22 RX ADMIN — DEXTROSE MONOHYDRATE ONE MG: 50 INJECTION, SOLUTION INTRAVENOUS at 20:30

## 2024-08-22 RX ADMIN — Medication ONE MG: at 20:30

## 2024-08-22 NOTE — NUR
BIBR 88 C/O CHEST PAIN, R LEG PAIN POST INJURY AND SURG. 6MOS. AGO, PLACED TO 
BED AND HOOKED INTO A MONITOR

## 2024-08-23 VITALS — DIASTOLIC BLOOD PRESSURE: 81 MMHG | TEMPERATURE: 98.3 F | OXYGEN SATURATION: 99 % | SYSTOLIC BLOOD PRESSURE: 149 MMHG

## 2024-08-23 VITALS — OXYGEN SATURATION: 96 %

## 2024-08-23 VITALS — DIASTOLIC BLOOD PRESSURE: 91 MMHG | OXYGEN SATURATION: 92 % | TEMPERATURE: 98.2 F | SYSTOLIC BLOOD PRESSURE: 184 MMHG

## 2024-08-23 VITALS — DIASTOLIC BLOOD PRESSURE: 86 MMHG | SYSTOLIC BLOOD PRESSURE: 172 MMHG

## 2024-08-23 VITALS — OXYGEN SATURATION: 93 % | DIASTOLIC BLOOD PRESSURE: 78 MMHG | SYSTOLIC BLOOD PRESSURE: 176 MMHG | TEMPERATURE: 98.1 F

## 2024-08-23 VITALS — OXYGEN SATURATION: 91 % | DIASTOLIC BLOOD PRESSURE: 85 MMHG | SYSTOLIC BLOOD PRESSURE: 182 MMHG | TEMPERATURE: 98.1 F

## 2024-08-23 VITALS — OXYGEN SATURATION: 97 %

## 2024-08-23 VITALS — OXYGEN SATURATION: 91 % | SYSTOLIC BLOOD PRESSURE: 184 MMHG | DIASTOLIC BLOOD PRESSURE: 91 MMHG | TEMPERATURE: 98.2 F

## 2024-08-23 VITALS — SYSTOLIC BLOOD PRESSURE: 149 MMHG | TEMPERATURE: 98.3 F | DIASTOLIC BLOOD PRESSURE: 81 MMHG

## 2024-08-23 VITALS — DIASTOLIC BLOOD PRESSURE: 81 MMHG | SYSTOLIC BLOOD PRESSURE: 155 MMHG

## 2024-08-23 VITALS — TEMPERATURE: 98.1 F | SYSTOLIC BLOOD PRESSURE: 178 MMHG | OXYGEN SATURATION: 93 % | DIASTOLIC BLOOD PRESSURE: 92 MMHG

## 2024-08-23 VITALS — OXYGEN SATURATION: 94 %

## 2024-08-23 LAB
ALBUMIN SERPL BCP-MCNC: 2.9 G/DL (ref 3.4–5)
ALP SERPL-CCNC: 180 U/L (ref 46–116)
ALT SERPL W P-5'-P-CCNC: 72 U/L (ref 12–78)
AST SERPL W P-5'-P-CCNC: 33 U/L (ref 15–37)
BASOPHILS # BLD AUTO: 0.2 K/UL (ref 0–0.2)
BASOPHILS NFR BLD AUTO: 1.2 % (ref 0–2)
BILIRUB DIRECT SERPL-MCNC: 0.1 MG/DL (ref 0–0.2)
BILIRUB SERPL-MCNC: 0.5 MG/DL (ref 0.2–1)
BUN SERPL-MCNC: 55 MG/DL (ref 7–18)
CALCIUM SERPL-MCNC: 9.3 MG/DL (ref 8.5–10.1)
CHLORIDE SERPL-SCNC: 92 MMOL/L (ref 98–107)
CO2 SERPL-SCNC: 26 MMOL/L (ref 21–32)
CREAT SERPL-MCNC: 4.9 MG/DL (ref 0.6–1.3)
CRP SERPL-MCNC: 4.98 MG/DL (ref 0–0.3)
EOSINOPHIL # BLD AUTO: 0.2 K/UL (ref 0–0.7)
EOSINOPHIL NFR BLD AUTO: 1.7 % (ref 0–6)
ERYTHROCYTE [DISTWIDTH] IN BLOOD BY AUTOMATED COUNT: 21.2 % (ref 11.5–15)
GLUCOSE SERPL-MCNC: 124 MG/DL (ref 74–106)
HCT VFR BLD AUTO: 24 % (ref 33–45)
HGB BLD-MCNC: 7.2 G/DL (ref 11.5–14.8)
LYMPHOCYTES NFR BLD AUTO: 0.4 K/UL (ref 0.8–4.8)
LYMPHOCYTES NFR BLD AUTO: 2.8 % (ref 20–44)
MAGNESIUM SERPL-MCNC: 2.3 MG/DL (ref 1.8–2.4)
MCH RBC QN AUTO: 28 PG (ref 26–33)
MCHC RBC AUTO-ENTMCNC: 31 G/DL (ref 31–36)
MCV RBC AUTO: 92 FL (ref 82–100)
MONOCYTES NFR BLD AUTO: 0.7 K/UL (ref 0.1–1.3)
MONOCYTES NFR BLD AUTO: 5.4 % (ref 2–12)
NEUTROPHILS # BLD AUTO: 12.4 K/UL (ref 1.8–8.9)
NEUTROPHILS NFR BLD AUTO: 88.9 % (ref 43–81)
PHOSPHATE SERPL-MCNC: 7.4 MG/DL (ref 2.5–4.9)
PLATELET # BLD AUTO: 343 K/UL (ref 150–450)
POTASSIUM SERPL-SCNC: 4.2 MMOL/L (ref 3.5–5.1)
PROT SERPL-MCNC: 7.6 G/DL (ref 6.4–8.2)
RBC # BLD AUTO: 2.56 MIL/UL (ref 4–5.2)
SODIUM SERPL-SCNC: 134 MMOL/L (ref 136–145)
TSH SERPL DL<=0.005 MIU/L-ACNC: 2.57 UIU/ML (ref 0.36–3.74)
WBC NRBC COR # BLD AUTO: 14 K/UL (ref 4.3–11)

## 2024-08-23 PROCEDURE — 5A1D70Z PERFORMANCE OF URINARY FILTRATION, INTERMITTENT, LESS THAN 6 HOURS PER DAY: ICD-10-PCS | Performed by: INTERNAL MEDICINE

## 2024-08-23 RX ADMIN — INSULIN HUMAN PRN UNITS: 100 INJECTION, SOLUTION PARENTERAL at 21:58

## 2024-08-23 RX ADMIN — Medication SCH EACH: at 06:46

## 2024-08-23 RX ADMIN — PANTOPRAZOLE SODIUM SCH MG: 40 TABLET, DELAYED RELEASE ORAL at 08:55

## 2024-08-23 RX ADMIN — CLONIDINE HYDROCHLORIDE PRN MG: 0.1 TABLET ORAL at 06:08

## 2024-08-23 RX ADMIN — HYDROMORPHONE HYDROCHLORIDE PRN MG: 1 INJECTION, SOLUTION INTRAMUSCULAR; INTRAVENOUS; SUBCUTANEOUS at 01:14

## 2024-08-23 RX ADMIN — INSULIN HUMAN SCH UNITS: 100 INJECTION, SOLUTION PARENTERAL at 00:30

## 2024-08-23 RX ADMIN — CEFEPIME HYDROCHLORIDE SCH MLS/HR: 1 INJECTION, POWDER, FOR SOLUTION INTRAMUSCULAR; INTRAVENOUS at 23:00

## 2024-08-23 RX ADMIN — DEXTROSE MONOHYDRATE PRN MLS/HR: 50 INJECTION, SOLUTION INTRAVENOUS at 23:58

## 2024-08-23 RX ADMIN — ALBUTEROL SULFATE SCH MG: 2.5 SOLUTION RESPIRATORY (INHALATION) at 07:52

## 2024-08-23 NOTE — NUR
RN NOTE



WASTED MEDICATION DILAUDID

STOCK DOSE :1MG/1ML DILAUDID

WASTED DOSE:0.5MG

ORDERED DOSE:0.5MG 



WITNESSED BY CHANDA CRUZ

## 2024-08-23 NOTE — NUR
KACEY ARMAS NOTE



SEEN BY NEPHROLOGIST DR. AUGUSTE, WITH ORDER FOR HD. CONSENT SECURED AND ATTACHED TO CHART.

## 2024-08-23 NOTE — NUR
TELE RN NOTE



RECEIVED PATIENT IN BED ASLEEP. PATIENT EASILY WOKEN UP BY CALL OR LIGHT TOUCH. SHE IS ALERT 
AND ORIENTED X 4, ABLE TO MAKE NEEDS KNOWN. PATIENT ON OXYGEN AT 4 LPM VIA NASAL CANULA WITH 
NO SIGNS OF DISTRESS. NOTED EDEMA ON BLE. PATIENT REFUSED TO HAVE WOUND ON THE RLE BE 
DRESSED AT THIS TIME. WITH AC FISTULA ON THE RIGHT ARM FOR HD. WITH IV ACCESS ONT HE LEFT 
WRIST G 22 ON SALINE LOCK, PATENT AND INTACT. SAFETY MEASURES ENSURED WITH BED ON LOW LOCKED 
POSITION, CALL LIGHT WITHIN REACH, SIDERAILS RAISED, ALARM ON AND PONCHO LIGHT WITHIN REACH AT 
ALL TIMES. ENCOURAGED TO DO DEEP BREATHING EXERCISES. CONTINUING PLAN OF CARE.

## 2024-08-23 NOTE — NUR
SBP HIGH 

Patient denies headache, no c/o dizziness. SBP still high 170's-200's, dialysis nurse stated 
treatment is in process and is being wash out, per Dialysis nurse will reassess if needed BP 
medication one hour before treatment is finished. Will cont to monitor patient.

## 2024-08-23 NOTE — NUR
TELE RN ADMISSION NOTE



PATIENT ARRIVED IN THE UNIT, FROM ER VIA GURNEY, PATIENT ON OXYGEN 4L, V IA NASAL CANNULA, 
ON CARDIAC MONITOR READING SR, 77 WITH COMPLAINTS OF PAIN, NEEDS ATTENDED TO PATIENT, WAS 
ABLE TO ANSWER QUESTIONS, A/OX4 BUT WISHES TO REST AND AVOID TALKING FOR NOW, SAFETY 
MEASURES MAINTAINED PATIENT ORIENTED TO ROOM, BED SET TO LOW AND LOCKED BED ALARM ON SIDE 
RAILS UPX3 PLACED CALL LIGHT AND BEDSIDE TABLE WITHIN EASY REACH WILL CONTINUE WITH PLAN OF 
CARE

## 2024-08-23 NOTE — NUR
TELE RN CLOSING NOTE



PATIENT IN BED SLEEPING A/OX3 ABLE TO MAKE NEEDS KNOWN, ON OXYGEN 4L, VIA NASAL CANNULA, NO 
SIGNS OF SOB AND DISTRESS AT THIS TIME, WITH AN IV ACCESS ON THE LEFT WRIST #22G PATENT AND 
INTACT ON SL, PATIENT WITH COMPLAINTS OF PAIN PRN PAIN MEDICATION GIVEN,WITH RIGHT LOWER LEG 
WOUND NOTED, PATIENT VERBALIZED SHE DOES NOT WANT TO COVER IT AT THIS TIME ALL DUE MEDS 
GIVEN, KEPT PATIENT CLEAN AND DRY, SAFETY MEASURES MAINTINED BED SET TO LOW AND LOCKED BED 
ALARM ON SIDE RAILS UPX3 PLACED CALL LIGHT BEDSIDE TABLE WITHIN EASY REACH WILL ENDORSE TO 
INCOMING NURSE FOR CONTINUITY OF CARE

## 2024-08-23 NOTE — NUR
RN NOTE



PATIENT REFUSED BLOOD SUGAR CHECK PATIENT REFUSED INSULIN, VERBALIZED ITS THE LEAST OF MY 
CONCERNS RIGHT NOW, EDUCATED PATIENT, EXPLAINED RISK AND BENEFITS VERBALIZED UNDERSTANDING 
BUT STILL FIRMLY REFUSED, WILL CONTINUE TO MONITOR PATIENT

## 2024-08-23 NOTE — NUR
TELE RN NOTE



PATIENT SEEN BY WOUND SPECIALIST KEISHA AND PATIENT COMPLAINED OF PAIN ON BLE RIGHT MORE THAN 
LEFT. DILAUDID GIVEN AS ORDERED FOR PAIN 8/10. PROVIDED WITH CALM AND QUIET ENVIRONMENT.

## 2024-08-23 NOTE — NUR
DIALYSIS TREATMENT

Patient in bed, awake, Alert Oriented x3. On Oxygen support 4L NC, denies sob. SBP high on 
180's, patient denies dizziness, no c/o headache. Started on Dialysis treatment, will call 
lab for blood draw as patient refused am lab test. Will cont to monitor.

## 2024-08-23 NOTE — NUR
RN NOTE



PATIENT WITH MEDICATION MILK OF MAGNESIA MAALOX AND MORPHINE ORDERED, PHARMACY NOTE: NOT 
RECOMMENDED FOR CrCl <30, INFORMED HOSPITALIST ONEYDA MADRID WITH NEW ORRDERS, TO D/C SAID 
MEDICATION, AND A NEW ORDER OF DILAUDID 0.5MG Q4 PRN IV, ORDERS ENTER, AND CARRIED OUT WILL 
CONTINUE TO MONITOR PATIENT

## 2024-08-23 NOTE — NUR
RN NOTE



CHECKED PATIENT VITAL SIGNS NOTED HIGH BLOOD PRESSURE 172//86 INFORMED HOSPITALIST MADRID 
WITH NEW ORDERS OF CLONIDINE 0.1MG PO Q6HRS FOR SBP ABOVE 170, ORDERS ENTERED AND CARRIED 
OUT

## 2024-08-23 NOTE — NUR
VANCOMYCIN LEVEL

Resulted 12. Dialysis treatment in progress, informed Alia/Pharmacy with Vancomycin 
random level 12. Per Alia to give dose 500mg tonight.

## 2024-08-23 NOTE — NUR
TELE RN NOTE



PATIENT STARTED ON HD AS ORDERED. WAS SEEN BY DR. SANTIAGO, WITH ORDER TO START IV ANTIBIOTIC. 
AFTER HD. PATIENT ALSO FOR VANCOMYCIN IV POST HD.

## 2024-08-23 NOTE — NUR
WOUND CARE CONSULT: PT PRESENTS WITH CHRONIC WOUNDS WITH SURROUNDING ERYTHEMA TO RT LATERAL 
ANKLE, PRESENT ON ADMISSION. DR SYED CALLED FOR DPM CONSULT. DISCUSSED SKIN 
PROTECTION WITH NURSING STAFF. MD IN AGREEMENT WITH PLAN OF CARE.

## 2024-08-23 NOTE — NUR
TELE NOTE



NOTED ELEVATED BP. TRIED TO CHECK BP MANUALLY, BUT PATIENT REFUSED TO HAVE BP CHECKED. SHE 
SAID SHE WANTS TO SLEEP. PATIENT ALSO REFUSING TO HAVE BLOOD DRAWN AT THIS TIME.

## 2024-08-23 NOTE — NUR
DIALYSIS TREATMENT DONE

3L Fluid output per RN Greg/Dialysis nurse. /109 HR 91 patient asymptomatic, notified 
NP Jean. Will cont to monitor post dialysis treatment, BP to lower reading.

## 2024-08-23 NOTE — NUR
TELE RN NOTE



PATIENT IN STABLE CONDITION. WITH ONGOING HD AS ORDERED, TOLERATING WEE. STILL FOR MED 
RECON, MD AWARE. NEEDS ATTENDED. ON TELE MONITOR READING SR AT 78BPM. TO START ON IV 
ANTIBIOTICS, DENIES PAN AT THIS TIME. ENDORSED TO NEXT SHIFT FOR CONTINUITY OF CARE.

## 2024-08-24 VITALS — TEMPERATURE: 97.7 F | DIASTOLIC BLOOD PRESSURE: 72 MMHG | OXYGEN SATURATION: 95 % | SYSTOLIC BLOOD PRESSURE: 167 MMHG

## 2024-08-24 VITALS — OXYGEN SATURATION: 98 % | SYSTOLIC BLOOD PRESSURE: 166 MMHG | DIASTOLIC BLOOD PRESSURE: 92 MMHG | TEMPERATURE: 98.4 F

## 2024-08-24 VITALS — DIASTOLIC BLOOD PRESSURE: 72 MMHG | OXYGEN SATURATION: 95 % | TEMPERATURE: 97.7 F | SYSTOLIC BLOOD PRESSURE: 167 MMHG

## 2024-08-24 VITALS — OXYGEN SATURATION: 97 % | SYSTOLIC BLOOD PRESSURE: 164 MMHG | DIASTOLIC BLOOD PRESSURE: 98 MMHG | TEMPERATURE: 97.3 F

## 2024-08-24 VITALS — TEMPERATURE: 98.1 F | OXYGEN SATURATION: 100 % | DIASTOLIC BLOOD PRESSURE: 105 MMHG | SYSTOLIC BLOOD PRESSURE: 219 MMHG

## 2024-08-24 VITALS — SYSTOLIC BLOOD PRESSURE: 164 MMHG | TEMPERATURE: 97.3 F | DIASTOLIC BLOOD PRESSURE: 98 MMHG | OXYGEN SATURATION: 97 %

## 2024-08-24 VITALS — SYSTOLIC BLOOD PRESSURE: 185 MMHG | OXYGEN SATURATION: 99 % | DIASTOLIC BLOOD PRESSURE: 82 MMHG | TEMPERATURE: 98.1 F

## 2024-08-24 VITALS — TEMPERATURE: 98.4 F | DIASTOLIC BLOOD PRESSURE: 99 MMHG | SYSTOLIC BLOOD PRESSURE: 125 MMHG | OXYGEN SATURATION: 97 %

## 2024-08-24 VITALS — OXYGEN SATURATION: 98 %

## 2024-08-24 RX ADMIN — SODIUM HYPOCHLORITE SCH ML: 5 SOLUTION TOPICAL at 08:57

## 2024-08-24 NOTE — NUR
TELE RN NOTES

SR-82 ON TELE MONITOR. C/O PAIN ON THE RIGHT LOWER LEG,8/10 ON PAIN SCALE./98 DUE TO 
PAIN.MEDICATED WITH DILAUDID 0.5MG IV FOR SEVERE PAIN,0.5 MG WASTED WITNESSED BY ANOTHER RN 
SALMA.

## 2024-08-24 NOTE — NUR
ELEVATED BP

Still with elevated /82 pulse85 Patient with right leg pain 8/10. Denies headache, no 
c/o dizziness. PRN PO Clonidine given, will cont to monitor.

## 2024-08-24 NOTE — NUR
NARCOTIC WASTE/LEG PAIN 

Patient in bed, c/o right lower leg pain 8/10 described as sharp and throbbing, limited leg 
movement d/t pain. [facility], wasted 0.5mg witnessed by CHANDA Parham. 
Given PRN IV Dilaudid, will reassess pain level.

## 2024-08-24 NOTE — NUR
RN NOTE

PATIENT COMPLAINED OF LOWER LEG PAIN RATED AS 9/10. DILAUDID PULLED OUT FROM [facility]MN[facility], 
WASTED 0.5MG, WITNESSED BY RN ESTELITA. GAVE PRN DILAUDID 0.5MG, WILL RE-ASSESS PAIN LEVEL.

## 2024-08-24 NOTE — NUR
TELE RN OPENING NOTE

RECEIVED PATIENT AWAKE IN BED, A/O X4, ABLE TO MAKE NEEDS KNOWN, ON 5L O2, VIA NASAL 
CANNULA, NO SIGNS OF SOB, NO DISTRESS NOTED AT THIS TIME. PATIENT HAS IV ACCESS ON THE LEFT 
HAND #22G, INTACT, PATENT AND ON SL. SAFETY MEASURES IN PLACE: BED ON LOW AND LOCKED 
POSITION, BED ALARM ON, SIDE RAILS UP X2, CALL LIGHT AND BEDSIDE TABLE WITHIN EASY REACH. 
PLAN OF CARE ONGOING.

## 2024-08-24 NOTE — NUR
END OF SHIFT SUMMARY REPORT

Patient in bed, Alert Oriented x 4. Oxygen sat low 90's on 5L NC, dyspnea on exertion, 
patient denies chest pain. BP slowly improved after PO Clonidine. Afebrile during the night. 
Restless at times, uncooperative with scheduled lab draw, wanted on her own time. Declined 
despite education. Cont on abx. Wound care done. Pending US BLE, ECHO, Right ankle XR. Care 
endorsed to CHANDA Rogers.

## 2024-08-24 NOTE — NUR
TELE RN NOTES

RECEIVED ON BED,ON HIGH FOWLERS POSITION,FAMILY MEMBERS AT BEDSIDE.A/O X4,BREATHING EVEN AND 
UNLABORED,O2 IN USED AT 2L/NC TO KEEP O2 SAT ABOVE 90%.ANURIC,ON DIALYSIS,HAD ONE YESTERDAY 
PULLED OUT 3 LITERS.WITH PERFECTO AVF FOR HD ACCESS,LEFT HAND SALINE LOCK FOR MEDS.RIGHT LOWER 
LEG WITH DRESSING INTACT AND DRY.DENIES DISCOMFORTS AT THE MOMENT,CALL LIGHT IN REACH,NEEDS 
ANTICIPATED.

## 2024-08-24 NOTE — NUR
rn notes:



stock dose dilaudid iv 1mg/ml, desired dose dilaudid iv 0.5mg (0.5ml) given, wasted dilaudid 
0.5mg (1/2 ml ) as witness with rn rosyie

## 2024-08-24 NOTE — NUR
TELE RN CLOSING NOTE

PATIENT IS AWAKE IN BED WITH FAMILY AT BEDSIDE, A/O X4, ABLE TO MAKE NEEDS KNOWN, ON 5L O2, 
VIA NASAL CANNULA, NO SIGNS OF SOB, NO DISTRESS NOTED AT THIS TIME. PATIENT HAS IV ACCESS ON 
THE LEFT HAND #22G, INTACT, PATENT AND ON SL. PATIENT CONTINUES ON TELE MONITOR. DUE MEDS 
GIVEN. CARE RENDERED. NEEDS ATTENDED. SAFETY MEASURES MAINTAINED: BED ON LOW AND LOCKED 
POSITION, BED ALARM ON, SIDE RAILS UP X2, CALL LIGHT AND BEDSIDE TABLE WITHIN EASY REACH. 
ENDORSED TO NIGHT SHIFT NURSE FOR CONTINUITY OF CARE.

## 2024-08-25 VITALS — TEMPERATURE: 97.5 F | SYSTOLIC BLOOD PRESSURE: 203 MMHG | OXYGEN SATURATION: 99 % | DIASTOLIC BLOOD PRESSURE: 88 MMHG

## 2024-08-25 VITALS — OXYGEN SATURATION: 98 % | TEMPERATURE: 98.2 F | DIASTOLIC BLOOD PRESSURE: 80 MMHG | SYSTOLIC BLOOD PRESSURE: 173 MMHG

## 2024-08-25 VITALS — TEMPERATURE: 98.2 F | OXYGEN SATURATION: 95 % | SYSTOLIC BLOOD PRESSURE: 193 MMHG | DIASTOLIC BLOOD PRESSURE: 92 MMHG

## 2024-08-25 VITALS — TEMPERATURE: 98.1 F | SYSTOLIC BLOOD PRESSURE: 213 MMHG | DIASTOLIC BLOOD PRESSURE: 103 MMHG | OXYGEN SATURATION: 98 %

## 2024-08-25 VITALS — DIASTOLIC BLOOD PRESSURE: 102 MMHG | TEMPERATURE: 98.8 F | SYSTOLIC BLOOD PRESSURE: 208 MMHG | OXYGEN SATURATION: 96 %

## 2024-08-25 VITALS — SYSTOLIC BLOOD PRESSURE: 208 MMHG | DIASTOLIC BLOOD PRESSURE: 100 MMHG | OXYGEN SATURATION: 96 % | TEMPERATURE: 98.8 F

## 2024-08-25 VITALS — SYSTOLIC BLOOD PRESSURE: 178 MMHG | DIASTOLIC BLOOD PRESSURE: 79 MMHG | TEMPERATURE: 98.6 F | OXYGEN SATURATION: 95 %

## 2024-08-25 LAB
ALBUMIN SERPL BCP-MCNC: 3 G/DL (ref 3.4–5)
ALP SERPL-CCNC: 189 U/L (ref 46–116)
ALT SERPL W P-5'-P-CCNC: 48 U/L (ref 12–78)
AST SERPL W P-5'-P-CCNC: 23 U/L (ref 15–37)
BASOPHILS # BLD AUTO: 0.1 K/UL (ref 0–0.2)
BASOPHILS NFR BLD AUTO: 1.1 % (ref 0–2)
BILIRUB SERPL-MCNC: 0.6 MG/DL (ref 0.2–1)
BUN SERPL-MCNC: 55 MG/DL (ref 7–18)
CALCIUM SERPL-MCNC: 10 MG/DL (ref 8.5–10.1)
CHLORIDE SERPL-SCNC: 92 MMOL/L (ref 98–107)
CO2 SERPL-SCNC: 30 MMOL/L (ref 21–32)
CREAT SERPL-MCNC: 4.8 MG/DL (ref 0.6–1.3)
EOSINOPHIL # BLD AUTO: 0.2 K/UL (ref 0–0.7)
EOSINOPHIL NFR BLD AUTO: 1.5 % (ref 0–6)
ERYTHROCYTE [DISTWIDTH] IN BLOOD BY AUTOMATED COUNT: 20.5 % (ref 11.5–15)
GLUCOSE SERPL-MCNC: 167 MG/DL (ref 74–106)
HCT VFR BLD AUTO: 23 % (ref 33–45)
HGB BLD-MCNC: 7.2 G/DL (ref 11.5–14.8)
LYMPHOCYTES NFR BLD AUTO: 0.4 K/UL (ref 0.8–4.8)
LYMPHOCYTES NFR BLD AUTO: 3.2 % (ref 20–44)
MAGNESIUM SERPL-MCNC: 2.4 MG/DL (ref 1.8–2.4)
MCH RBC QN AUTO: 29 PG (ref 26–33)
MCHC RBC AUTO-ENTMCNC: 32 G/DL (ref 31–36)
MCV RBC AUTO: 91 FL (ref 82–100)
MONOCYTES NFR BLD AUTO: 0.7 K/UL (ref 0.1–1.3)
MONOCYTES NFR BLD AUTO: 6.4 % (ref 2–12)
NEUTROPHILS # BLD AUTO: 9.9 K/UL (ref 1.8–8.9)
NEUTROPHILS NFR BLD AUTO: 87.8 % (ref 43–81)
PHOSPHATE SERPL-MCNC: 7.5 MG/DL (ref 2.5–4.9)
PLATELET # BLD AUTO: 320 K/UL (ref 150–450)
POTASSIUM SERPL-SCNC: 4.4 MMOL/L (ref 3.5–5.1)
PROT SERPL-MCNC: 7.8 G/DL (ref 6.4–8.2)
RBC # BLD AUTO: 2.5 MIL/UL (ref 4–5.2)
SODIUM SERPL-SCNC: 133 MMOL/L (ref 136–145)
WBC NRBC COR # BLD AUTO: 11.3 K/UL (ref 4.3–11)

## 2024-08-25 RX ADMIN — GABAPENTIN SCH MG: 300 CAPSULE ORAL at 22:00

## 2024-08-25 RX ADMIN — MONTELUKAST SODIUM SCH MG: 10 TABLET, FILM COATED ORAL at 18:32

## 2024-08-25 RX ADMIN — INSULIN GLARGINE SCH UNIT: 100 INJECTION, SOLUTION SUBCUTANEOUS at 20:59

## 2024-08-25 RX ADMIN — DOXYCYCLINE HYCLATE SCH MG: 100 TABLET, COATED ORAL at 20:59

## 2024-08-25 RX ADMIN — CLONIDINE HYDROCHLORIDE SCH MG: 0.1 TABLET ORAL at 13:19

## 2024-08-25 RX ADMIN — ALLOPURINOL SCH MG: 100 TABLET ORAL at 13:18

## 2024-08-25 RX ADMIN — DOCUSATE SODIUM SCH MG: 250 CAPSULE, LIQUID FILLED ORAL at 18:31

## 2024-08-25 RX ADMIN — SERTRALINE HYDROCHLORIDE SCH MG: 25 TABLET, FILM COATED ORAL at 13:19

## 2024-08-25 RX ADMIN — FERROUS SULFATE TAB 325 MG (65 MG ELEMENTAL FE) SCH MG: 325 (65 FE) TAB at 18:32

## 2024-08-25 RX ADMIN — GLIMEPIRIDE SCH MG: 4 TABLET ORAL at 18:32

## 2024-08-25 RX ADMIN — LABETALOL HYDROCHLORIDE PRN MG: 5 INJECTION, SOLUTION INTRAVENOUS at 06:29

## 2024-08-25 RX ADMIN — Medication SCH MG: at 18:31

## 2024-08-25 RX ADMIN — HYDRALAZINE HYDROCHLORIDE PRN MG: 20 INJECTION INTRAMUSCULAR; INTRAVENOUS at 02:42

## 2024-08-25 RX ADMIN — FUROSEMIDE SCH MG: 40 TABLET ORAL at 18:32

## 2024-08-25 RX ADMIN — Medication SCH MG: at 13:17

## 2024-08-25 NOTE — NUR
RN NOTE



INSPITE OF CATAPRES AND DILAUDID GIVEN, PATIENT BP /103. DR. MONTERO AND DR AUGUSTE INFORMED.

## 2024-08-25 NOTE — NUR
TELE RN NOTES

BLOOD PRESSURE STILL HIGH /100,DENIES HEADACHE,SLEEPING,EASILY AROUSE TO VERBAL 
STIMULI.HOSPITALIST ONEYDA MADE AWARE, AWAITING FOR ORDERS.

## 2024-08-25 NOTE — NUR
RN NOTE



PATIENT BP /90, DR. SHORT HAS NEW ORDERS FOR BLOOD PRESSURE, CARRIED OUT AND 
GIVEN TO PATIENT. DR. AUGUSTE ORDERED HEMODIALYSIS SESSION FOR TODAY.

## 2024-08-25 NOTE — NUR
RN NOTE



LABETALOL 20MG IVP GIVEN SLOWLY AS ORDERED DUE TO CONSISTENT HIGH BP. WILL CONTINUE TO 
MONITOR

## 2024-08-25 NOTE — NUR
TELE RN NOTES

MEDICATED WITH DILAUDID 0.5MG IV AS ORDERED,OTHER HALF 0.5MG WASTED AND DUMPED ON RX 
DESTROYER WITNESSED BY CHANDA MARSH.

-------------------------------------------------------------------------------

Addendum: 24 at 0508 by RODRIGUE LOYD RN

-------------------------------------------------------------------------------

rn notes:



stock dose Dilaudid 1mg iv, desired dose: dilaudid 0.5m/2 ml  iv q4h prn for pain,  
given, wasted: 0.5mg (1/2 ml) Dilaudid witness for rn ofie.

## 2024-08-25 NOTE — NUR
TELE RN NOTES

/88,PULSE-85,HYDRALAZINE MG/0,25ML GIVEN IVP AS ORDERED FOR SBP ABOVE 160.WILL 
CONTINUE TO MONITOR

-------------------------------------------------------------------------------

Addendum: 08/25/24 at 0252 by ARIANA BREWER RN

-------------------------------------------------------------------------------

HYDRALAZINE 5MG/0.25ML

## 2024-08-25 NOTE — NUR
RN NARCOTIC WASTE 



Witnessed Flavia RN take Dilaudid 1mg from [facility], 0.5 given. Witnessed waste of 0.5mg to 
medication disposal jug.

## 2024-08-25 NOTE — NUR
TELE RN OPENING NOTE



RECEIVED PATIENT AWAKE IN BED, RESTING. A&O X 4, ABLE TO MAKE NEEDS KNOWN. ON 6L OF O2 VIA 
NC. BREATHING EVEN AND NON LABORED. WITH EXTERNAL CARDIAC MONITOR WITH CURRENT READING OF SR 
WITH HEART RATE 80. PATIENT WITH IV ON LEFT HAND G22, PATENT INTACT AND FLUSHING WELL. IV 
SALINE LOCKED. RIGHT LOWER LEG DRESSING, C/D/I. GENERALIZED EDEMA NOTED. ON DIAPER. ABLE TO 
AMBULATE WITH STEADY GAIT AND STAND BY ASSIST. FALL AND SAFETY MEASURES ARE IN PLACE: BED ON 
LOW LOCKED POSITION, SIDERAILS RAISED, ALARM ON AND CALL LIGHT WITHIN EASY REACH AT ALL 
TIMES. PLAN OF CARE OF ONGOING.

## 2024-08-25 NOTE — NUR
TELE RN NOTES

BP-203/88, CATAPRES 0.1MG PO GIVEN AS ORDERED FOR SBP > 170.WILL CONTINUE TO MONITOR.

## 2024-08-25 NOTE — NUR
TELE RN NOTES 

HOSPITALIST ONEYDA MADE AWARE OF HIGH BLOOD PRESSURE, WITH NEW ORDERS NOTED AND CARRIED OUT.

## 2024-08-25 NOTE — NUR
RN OPENING NOTE



PATIENT AWAKE IN BED, RESTING. A/OX4, ABLE TO MAKE NEEDS KNOWN. ON 2L OF O2 VIA NC. 
BREATHING EVEN AND NON LABORED. WITH EXTERNAL CARDIAC MONITOR WITH CURRENT READING OF SR HR 
70'S. PATIENT WITH IV ON LEFT HAND G22, PATENT INTACT AND FLUSHING WELL. IV SALINE LOCKED. 
FALL AND SAFETY MEASURES IN PLACE. BED ALARM ON. BED IN LOW AND LOCKED POSITION. CALL LIGHT 
AND SIDE TABLE WITHIN EASY REACH. SIDE RAILS UP X3. WILL CONTINUE TO MONITOR. 

-------------------------------------------------------------------------------

Addendum: 08/25/24 at 1107 by RADHA MISHRA RN

-------------------------------------------------------------------------------

ADDENDUM - PATIENT IS CURRENTLY ON 6L OF O2 VIA NC, NOT 2L PREVIOUSLY MENTIONED.

## 2024-08-25 NOTE — NUR
NARCOTIC WASTE NOTE



WITNESSED CHANDA BROWNE REMOVE DILAUDID 1 MG IV FROM [facility] AND WASTE 0.5 MG IN RX DESTROYER

## 2024-08-25 NOTE — NUR
RN NOTES



PT REFUSED ACCU CHECK, INSULIN COVERAGE AND PO DUE MEDS. WANTED TO SLEEP WITH NO 
INTERRUPTIONS. EDUCATION GIVEN, EXPLAINED RISK AND BENEFITS VERBALIZED UNDERSTANDING BUT 
STILL REFUSED. PLAN OF CARE ONGOING.

## 2024-08-25 NOTE — NUR
RN NOTE



PATIENT SCHEDULED MEDICATIONS FOR 1700 GIVEN LATE AS PATIENT WAS UNDERGOING HEMODIALYSIS.

## 2024-08-25 NOTE — NUR
TELE RN NOTES

HEART RATE NOW SR-79,BP-156/95,SITTING ON EDGE OF BED THIS TIME,PAIN TOLERABLE,IN NO ACUTE 
DISTRESS.ENDORSED TO CHANDA GARCIA FOR [place].

## 2024-08-25 NOTE — NUR
RN CLOSING NOTE



PATIENT AWAKE IN BED, RESTING. A/OX4, ABLE TO MAKE NEEDS KNOWN. ON 6L OF O2 VIA NC. 
BREATHING EVEN AND NON LABORED. WITH EXTERNAL CARDIAC MONITOR WITH CURRENT READING OF SR HR 
70'S. PATIENT WITH LEFT UPPER ARM MIDLINE, PATENT INTACT AND FLUSHING WELL. IV SALINE 
LOCKED. FALL AND SAFETY MEASURES IN PLACE. BED ALARM ON. BED IN LOW AND LOCKED POSITION. 
CALL LIGHT AND SIDE TABLE WITHIN EASY REACH. SIDE RAILS UP X3. JUST FINISHED HEMODIALYISIS, 
1682ML TAKEN OUT. SCHEDULED MEDICATIONS ADMINISTERED. WOUND CARE IMPLEMENTED. PATIENT TURNED 
AND REPOSITIONED AS PER PROTOCOL. ALL NEEDS ATTENDED AND ANTICIPATED. WILL ENDORSE TO NIGHT 
SHIFT RN.

## 2024-08-25 NOTE — NUR
RN NARCOTIC WASTE 



Witnessed Alida RN take Dilaudid 1mg from [facility], 0.5 given and witnessed wasted 0.5mg to 
medication disposal jug.

## 2024-08-25 NOTE — NUR
TELE RN NOTES

BP RECHECK ONE HOUR AFTER GIVEN CATAPRES 0.1MG PO /93,PATIENT IN SO MUCH PAIN ON 
RIGHT LOWER LEG,9/10 ON PAIN SCALE. WILL MEDICATE FOR PAIN.

## 2024-08-25 NOTE — NUR
RN NARCOTIC WASTE 



Patient awake in bed, c/o right lower leg pain 8/10 described as sharp and throbbing, 
limited leg movement d/t pain. asked for pain medication. Dilaudid 1mg taken from [facility], 
0.5 given and wasted 0.5mg witnessed by CHANDA Mendoza. will reassess pain level. plan of care 
ongoing.

## 2024-08-25 NOTE — NUR
RN NARCOTIC WASTE 



WITNESSED BY CHANDA ONEIL TOOK DILAUDID 1MG FROM [facility] 0.5MG GIVEN AND WITNESSED WASTED 
0.5MG TO MEDICATION DISPOSAL JUG.

## 2024-08-26 VITALS — SYSTOLIC BLOOD PRESSURE: 188 MMHG | TEMPERATURE: 98.4 F | DIASTOLIC BLOOD PRESSURE: 104 MMHG

## 2024-08-26 VITALS — OXYGEN SATURATION: 95 % | TEMPERATURE: 97.5 F | DIASTOLIC BLOOD PRESSURE: 98 MMHG | SYSTOLIC BLOOD PRESSURE: 183 MMHG

## 2024-08-26 VITALS — TEMPERATURE: 98.4 F | DIASTOLIC BLOOD PRESSURE: 91 MMHG | SYSTOLIC BLOOD PRESSURE: 175 MMHG | OXYGEN SATURATION: 98 %

## 2024-08-26 VITALS — DIASTOLIC BLOOD PRESSURE: 72 MMHG | OXYGEN SATURATION: 99 % | TEMPERATURE: 97.7 F | SYSTOLIC BLOOD PRESSURE: 166 MMHG

## 2024-08-26 VITALS — OXYGEN SATURATION: 99 % | SYSTOLIC BLOOD PRESSURE: 159 MMHG | TEMPERATURE: 97.4 F | DIASTOLIC BLOOD PRESSURE: 97 MMHG

## 2024-08-26 VITALS — DIASTOLIC BLOOD PRESSURE: 90 MMHG | TEMPERATURE: 98.6 F | SYSTOLIC BLOOD PRESSURE: 194 MMHG

## 2024-08-26 VITALS — SYSTOLIC BLOOD PRESSURE: 148 MMHG | DIASTOLIC BLOOD PRESSURE: 80 MMHG | OXYGEN SATURATION: 99 % | TEMPERATURE: 97.7 F

## 2024-08-26 LAB
ALBUMIN SERPL BCP-MCNC: 3 G/DL (ref 3.4–5)
ALP SERPL-CCNC: 174 U/L (ref 46–116)
ALT SERPL W P-5'-P-CCNC: 48 U/L (ref 12–78)
ANISOCYTOSIS BLD QL: (no result)
AST SERPL W P-5'-P-CCNC: 26 U/L (ref 15–37)
BASOPHILS # BLD AUTO: 0.1 K/UL (ref 0–0.2)
BASOPHILS NFR BLD AUTO: 0.6 % (ref 0–2)
BASOPHILS NFR BLD MANUAL: 0 % (ref 0–2)
BILIRUB SERPL-MCNC: 0.5 MG/DL (ref 0.2–1)
BUN SERPL-MCNC: 40 MG/DL (ref 7–18)
CALCIUM SERPL-MCNC: 10.1 MG/DL (ref 8.5–10.1)
CHLORIDE SERPL-SCNC: 95 MMOL/L (ref 98–107)
CO2 SERPL-SCNC: 29 MMOL/L (ref 21–32)
CREAT SERPL-MCNC: 3.7 MG/DL (ref 0.6–1.3)
EOSINOPHIL # BLD AUTO: 0.2 K/UL (ref 0–0.7)
EOSINOPHIL NFR BLD AUTO: 2.5 % (ref 0–6)
EOSINOPHIL NFR BLD MANUAL: 4 % (ref 0–4)
ERYTHROCYTE [DISTWIDTH] IN BLOOD BY AUTOMATED COUNT: 20.2 % (ref 11.5–15)
GLUCOSE SERPL-MCNC: 184 MG/DL (ref 74–106)
HCT VFR BLD AUTO: 23 % (ref 33–45)
HCT VFR BLD AUTO: 26 % (ref 33–45)
HGB BLD-MCNC: 6.9 G/DL (ref 11.5–14.8)
HGB BLD-MCNC: 8.2 G/DL (ref 11.5–14.8)
LYMPHOCYTES NFR BLD AUTO: 0.4 K/UL (ref 0.8–4.8)
LYMPHOCYTES NFR BLD AUTO: 4.3 % (ref 20–44)
LYMPHOCYTES NFR BLD MANUAL: 6 % (ref 16–48)
MAGNESIUM SERPL-MCNC: 2.3 MG/DL (ref 1.8–2.4)
MCH RBC QN AUTO: 28 PG (ref 26–33)
MCHC RBC AUTO-ENTMCNC: 31 G/DL (ref 31–36)
MCV RBC AUTO: 91 FL (ref 82–100)
MONOCYTES NFR BLD AUTO: 0.9 K/UL (ref 0.1–1.3)
MONOCYTES NFR BLD AUTO: 9 % (ref 2–12)
MONOCYTES NFR BLD MANUAL: 6 % (ref 0–11)
NEUTROPHILS # BLD AUTO: 8.1 K/UL (ref 1.8–8.9)
NEUTROPHILS NFR BLD AUTO: 83.6 % (ref 43–81)
NEUTS SEG NFR BLD MANUAL: 84 % (ref 42–76)
PHOSPHATE SERPL-MCNC: 5.6 MG/DL (ref 2.5–4.9)
PLATELET # BLD AUTO: 324 K/UL (ref 150–450)
PLATELET BLD QL SMEAR: ADEQUATE
POTASSIUM SERPL-SCNC: 3.9 MMOL/L (ref 3.5–5.1)
PROT SERPL-MCNC: 7.3 G/DL (ref 6.4–8.2)
RBC # BLD AUTO: 2.46 MIL/UL (ref 4–5.2)
SODIUM SERPL-SCNC: 133 MMOL/L (ref 136–145)
WBC NRBC COR # BLD AUTO: 9.7 K/UL (ref 4.3–11)

## 2024-08-26 PROCEDURE — 30233N1 TRANSFUSION OF NONAUTOLOGOUS RED BLOOD CELLS INTO PERIPHERAL VEIN, PERCUTANEOUS APPROACH: ICD-10-PCS | Performed by: INTERNAL MEDICINE

## 2024-08-26 RX ADMIN — Medication SCH MG: at 09:05

## 2024-08-26 RX ADMIN — FLUTICASONE FUROATE AND VILANTEROL TRIFENATATE SCH EACH: 100; 25 POWDER RESPIRATORY (INHALATION) at 09:03

## 2024-08-26 RX ADMIN — ACETAMINOPHEN PRN MG: 325 TABLET ORAL at 06:49

## 2024-08-26 RX ADMIN — NIFEDIPINE SCH MG: 60 TABLET, EXTENDED RELEASE ORAL at 09:05

## 2024-08-26 RX ADMIN — PANTOPRAZOLE SODIUM SCH MG: 40 TABLET, DELAYED RELEASE ORAL at 07:44

## 2024-08-26 NOTE — NUR
RN NOTES



PT REFUSED INSULIN COVERAGE. EDUCATION GIVEN, EXPLAINED RISK AND BENEFITS VERBALIZED 
UNDERSTANDING BUT STILL REFUSED. PLAN OF CARE ONGOING.

-------------------------------------------------------------------------------

Addendum: 08/26/24 at 0927 by ESTELITA DIEGO RN

-------------------------------------------------------------------------------

ADDENDUM: INSULIN LANTHUS 50 UNITS. NOT INSULIN COVERAGE. MD AND CN INFORMED.

## 2024-08-26 NOTE — NUR
TELE RN OPENING NOTE



RECEIVED PATIENT IN BED, AWAKE,  A/O X 4, VERBALLY RESPONSIVE AND ABLE TO MAKE NEEDS KNOWN. 
ON O2 VIA NASAL CANNULA AT 6LPM. TOLERATING WELL, BREATHING EVEN AND NON LABORED. WITH 
EXTERNAL CARDIAC MONITOR WITH CURRENT READING OF SR [id number]. PATIENT WITH IV ACCESS ON EBER 
ML G18SL, PATENT INTACT AND FLUSHING WELL. WITH HD ACCESS PERFECTO AVF C/D/I, NO S/SX OF BLEEDING 
NOTED. GENERALIZED EDEMA NOTED. KEPT PATIENT CLEAN,DRY AND COMFORTABLE, FALL AND SAFETY 
MEASURES MAINTAINED: BED ON LOWEST AND LOCKED POSITION, SIDERAILS UP X2, BED ALARM ON, HOB 
SLIGHTLY ELEVATED,  CALL LIGHT WITHIN EASY REACH .PLAN OF CARE ONGOING.

## 2024-08-26 NOTE — NUR
RN NOTES



PATIENT COMPLAINED OF 10/10 LEG PAIN. PT HAS PRN DILAUDID 0.5 MGS FOR SEVERE PAIN Q4H. 
PATIENTS LEG REPOSITIONED WITH 2 PILLOWS. MEDS GIVEN AND RN ARACELI WITNESS THE WASTE. WILL 
MONITOR CLOSELY.

## 2024-08-26 NOTE — NUR
RN NOTES



PATIENT C/O LEG 10/10 PAIN SCALE. PT REQUESTED PRN PAINT MEDS, PRN DILAUDID 0.5 MEDS GIVEN 
AS ORDERED  AND 0.5 WASTED WITNESSED BY NANCY ARMAS. VITAL SIGN STABLE; BP;145/80, NJ;83, RR; 
19 TEMP; 97.7, O2 SAT; 99%. CONTINUE TO MONITOR.

## 2024-08-26 NOTE — NUR
RN NOTES



PATIENT COMPLAINED OF 10/10 BILATERAL LEG PAIN. PT HAS PRN DILAUDID 0.5 MGS FOR SEVERE PAIN 
Q4H. PATIENTS LEG REPOSITIONED WITH 2 PILLOWS. MEDS GIVEN AND CHANDA ALEJANDRE WITNESS THE WASTE. 
WILL MONITOR CLOSELY.

## 2024-08-26 NOTE — NUR
TELE RN CLOSING NOTE



PATIENT AWAKE IN BED, RESTING. A&O X 4, ABLE TO MAKE NEEDS KNOWN. ON 6L OF O2 VIA NC. 
BREATHING EVEN AND NON LABORED. WITH EXTERNAL CARDIAC MONITOR WITH CURRENT READING OF SR 
WITH HEART RATE 86. PATIENT WITH IV ON LEFT UA ML G18, PATENT INTACT AND FLUSHING WELL. IV 
SALINE LOCKED. WITH R UPPER AV  HEMODIALYSIS LINE; C/D/I. HD DONE TOPDAY WITH 2,00 L OUTPUT. 
PRBC TRANSFUSED TODAY. TOLERATED WELL. LOWER LEG WOUND DRESSING CHANGED. GENERALIZED EDEMA 
NOTED. ON DIAPER. ABLE TO AMBULATE WITH STEADY GAIT AND STAND BY ASSIST. ALL NEEDS ATTENDED 
AND ANTICIPATED. FALL AND SAFETY MEASURES ARE IN PLACE: BED ON LOW LOCKED POSITION, 
SIDERAILS RAISED, ALARM ON AND CALL LIGHT WITHIN EASY REACH AT ALL TIMES. ENDORSED TO PM RN 
FOR [place].

## 2024-08-26 NOTE — NUR
RN NARCOTIC WASTE 



WITNESSED BY CHANDA ALEJANDRE TOOK DILAUDID 1MG FROM [facility] 0.5MG GIVEN AND WITNESSED WASTED 0.5MG 
TO MEDICATION DISPOSAL JUG.

## 2024-08-26 NOTE — NUR
RN NOTES



PT REFUSED INSULIN COVERAGE. EDUCATION GIVEN, EXPLAINED RISK AND BENEFITS VERBALIZED 
UNDERSTANDING BUT STILL REFUSED. PLAN OF CARE ONGOING.

## 2024-08-26 NOTE — NUR
RN NOTES



PTS HGB IS LOW6.9. COLCHICINE 0.6 MGS PUT ON HOLD. SIDE EFFECT MAY DECREASE HGB LEVELS MORE.

## 2024-08-26 NOTE — NUR
RN NOTES



TRANSFUSION OF PRBC DONE THRU HD WITH TWO HD TECH AT BEDSIDE. PATIENT TOLERATED WELL VITALS 
ARE WITHIN NORMAL LIMITS; /90, RR 18, CT 74, TEMP IS 98.6, PULSE OX IS 97%.

## 2024-08-26 NOTE — NUR
TELE RN NOTE



PATIENT COMPLAINED OF PAIN 10/10. DILAUDID GIVEN AS ORDERED FOR PAIN 10/10. 0.5MG WASTED 
WITH NURSE MICHELLE AS WITNESS. [SOB on Exertion] : sob on exertion [Edema] : edema [Back Pain] : back pain [Diabetes] : diabetes [Negative] : Psychiatric [Thyroid Problem] : no thyroid problem

## 2024-08-26 NOTE — NUR
TELE RN OPENING NOTE



RECEIVED PATIENT AWAKE IN BED, RESTING. A&O X 4, ABLE TO MAKE NEEDS KNOWN. ON 6L OF O2 VIA 
NC. BREATHING IS SLIGHTLY UNEVEN AND  LABORED. WITH EXTERNAL CARDIAC MONITOR WITH CURRENT 
READING OF SR WITH HEART RATE 85. PATIENT WITH IV ON LEFT HAND G22, PATENT INTACT AND 
FLUSHING WELL. IV SALINE LOCKED. WITH R AC HEMODIALYSIS LINE; C/D/I. LOWER LEG WOUND C/D/I 
WITH NO PURULENT DISCHARGE. GENERALIZED EDEMA NOTED. ON DIAPER. ABLE TO AMBULATE WITH STEADY 
GAIT AND STAND BY ASSIST. FALL AND SAFETY MEASURES ARE IN PLACE: BED ON LOW LOCKED POSITION, 
SIDE RAILS RAISED, ALARM ON AND CALL LIGHT WITHIN EASY REACH AT ALL TIMES. POC CONTINUED 
TODAY.

-------------------------------------------------------------------------------

Addendum: 08/26/24 at 1445 by ESTELITA DIEGO RN

-------------------------------------------------------------------------------

ADDENDUM: IV IS 18G NOT 22.

## 2024-08-26 NOTE — NUR
RN NOTES



PATIENT C/O LEG 10/10 PAIN SCALE. PT REQUESTED PRN PAINT MEDS, PRN DILAUDID 0.5 MEDS GIVEN 
AS ORDERED  AND 0.5 WASTED WITNESSED BY NANCY ARMAS. VITAL SIGN STABLE; BP;145/80, FL;83, RR; 
19 TEMP; 97.7, O2 SAT; 99%. CONTINUE TO MONITOR.

-------------------------------------------------------------------------------

Addendum: 08/26/24 at 2316 by DOUGLAS DELEON RN

-------------------------------------------------------------------------------

WRONG ENTRY

## 2024-08-26 NOTE — NUR
TELE RN OPENING NOTE



PATIENT AWAKE IN BED, RESTING. A&O X 4, ABLE TO MAKE NEEDS KNOWN. ON 6L OF O2 VIA NC. 
BREATHING EVEN AND NON LABORED. WITH EXTERNAL CARDIAC MONITOR WITH CURRENT READING OF SR 
WITH HEART RATE 80. PATIENT WITH IV ON LEFT HAND G22, PATENT INTACT AND FLUSHING WELL. IV 
SALINE LOCKED. LOWER LEG WOUND DRESSING CHANGED. GENERALIZED EDEMA NOTED. ON DIAPER. ABLE TO 
AMBULATE WITH STEADY GAIT AND STAND BY ASSIST. ALL NEEDS ATTENDED AND ANTICIPATED. FALL AND 
SAFETY MEASURES ARE IN PLACE: BED ON LOW LOCKED POSITION, SIDERAILS RAISED, ALARM ON AND 
CALL LIGHT WITHIN EASY REACH AT ALL TIMES. ENDORSED TO DAY SHIFT NURSE FOR [place].


-------------------------------------------------------------------------------

Addendum: 08/26/24 at 0759 by HOLLIE BASURTO RN

-------------------------------------------------------------------------------

TELE RN CLOSING NOTES

## 2024-08-26 NOTE — NUR
RN NOTES



RECEIVED A CALL FROM [facility] REPORTING FOR CRITICAL LAB RESULT; HGB IS 6.9 AND HCT IS 
22.5. DR CAMPBELL AND CHARGE NURSE ARE INFORMED IMMEDIATELY. WILL MONITOR PT CLOSELY.

## 2024-08-26 NOTE — NUR
RN NOTES



PATIENT PRBC AND DIALYSIS WAS DONE TOGETHER AS PER PATIETNS REQUEST. PRBC DONE WITH 2 RN 
IDENTIFIER WITH CHANDA CHARLES. PATIENT WAS DIALYSIS TOGETHER. TWO [facility] D[facility] TECH AT [facility]. 
PRE VITALS DONE; BP: 199/104, RR 18, PULSE 78, TEMP AT 98.4. WILL MONITOR CLOSELY.

## 2024-08-27 VITALS — OXYGEN SATURATION: 97 %

## 2024-08-27 VITALS — TEMPERATURE: 97.9 F | DIASTOLIC BLOOD PRESSURE: 93 MMHG | SYSTOLIC BLOOD PRESSURE: 173 MMHG | OXYGEN SATURATION: 98 %

## 2024-08-27 VITALS — OXYGEN SATURATION: 100 % | SYSTOLIC BLOOD PRESSURE: 154 MMHG | DIASTOLIC BLOOD PRESSURE: 82 MMHG | TEMPERATURE: 98.4 F

## 2024-08-27 VITALS — TEMPERATURE: 97.7 F | SYSTOLIC BLOOD PRESSURE: 153 MMHG | DIASTOLIC BLOOD PRESSURE: 77 MMHG | OXYGEN SATURATION: 100 %

## 2024-08-27 VITALS — SYSTOLIC BLOOD PRESSURE: 153 MMHG | OXYGEN SATURATION: 100 % | DIASTOLIC BLOOD PRESSURE: 77 MMHG | TEMPERATURE: 97.7 F

## 2024-08-27 VITALS — OXYGEN SATURATION: 96 % | TEMPERATURE: 97.4 F | SYSTOLIC BLOOD PRESSURE: 190 MMHG | DIASTOLIC BLOOD PRESSURE: 90 MMHG

## 2024-08-27 LAB
ALBUMIN SERPL BCP-MCNC: 3.1 G/DL (ref 3.4–5)
ALP SERPL-CCNC: 169 U/L (ref 46–116)
ALT SERPL W P-5'-P-CCNC: 42 U/L (ref 12–78)
AST SERPL W P-5'-P-CCNC: 20 U/L (ref 15–37)
BASOPHILS # BLD AUTO: 0.1 K/UL (ref 0–0.2)
BASOPHILS NFR BLD AUTO: 0.9 % (ref 0–2)
BILIRUB SERPL-MCNC: 0.6 MG/DL (ref 0.2–1)
BUN SERPL-MCNC: 34 MG/DL (ref 7–18)
CALCIUM SERPL-MCNC: 10.3 MG/DL (ref 8.5–10.1)
CHLORIDE SERPL-SCNC: 95 MMOL/L (ref 98–107)
CO2 SERPL-SCNC: 28 MMOL/L (ref 21–32)
CREAT SERPL-MCNC: 3.6 MG/DL (ref 0.6–1.3)
EOSINOPHIL # BLD AUTO: 0.5 K/UL (ref 0–0.7)
EOSINOPHIL NFR BLD AUTO: 4.7 % (ref 0–6)
ERYTHROCYTE [DISTWIDTH] IN BLOOD BY AUTOMATED COUNT: 19.3 % (ref 11.5–15)
GLUCOSE SERPL-MCNC: 161 MG/DL (ref 74–106)
HCT VFR BLD AUTO: 26 % (ref 33–45)
HGB BLD-MCNC: 8.2 G/DL (ref 11.5–14.8)
LYMPHOCYTES NFR BLD AUTO: 0.4 K/UL (ref 0.8–4.8)
LYMPHOCYTES NFR BLD AUTO: 4.3 % (ref 20–44)
MAGNESIUM SERPL-MCNC: 2.4 MG/DL (ref 1.8–2.4)
MCH RBC QN AUTO: 29 PG (ref 26–33)
MCHC RBC AUTO-ENTMCNC: 32 G/DL (ref 31–36)
MCV RBC AUTO: 91 FL (ref 82–100)
MONOCYTES NFR BLD AUTO: 0.9 K/UL (ref 0.1–1.3)
MONOCYTES NFR BLD AUTO: 9.2 % (ref 2–12)
NEUTROPHILS # BLD AUTO: 8 K/UL (ref 1.8–8.9)
NEUTROPHILS NFR BLD AUTO: 80.9 % (ref 43–81)
PHOSPHATE SERPL-MCNC: 5.3 MG/DL (ref 2.5–4.9)
PLATELET # BLD AUTO: 354 K/UL (ref 150–450)
POTASSIUM SERPL-SCNC: 4.3 MMOL/L (ref 3.5–5.1)
PROT SERPL-MCNC: 7.7 G/DL (ref 6.4–8.2)
RBC # BLD AUTO: 2.84 MIL/UL (ref 4–5.2)
SODIUM SERPL-SCNC: 134 MMOL/L (ref 136–145)
WBC NRBC COR # BLD AUTO: 9.9 K/UL (ref 4.3–11)

## 2024-08-27 NOTE — NUR
TELE RN NOTES:



RECEIVED PATIENT AT 20:46H AND COMPLAINED OF GENERALIZED BODY PAIN 9/10, SCREAMING, AND 
AGITATED. VITALS CHECKED AND DOCUMENTED. DILAUDID 0.5MG IV WAS PULLED OUT FROM OMNICELL AT 
20:46H AND CESAR LUX RN WITNESSED WASTING OF 0.5MG DILAUDID OUT OF 1MG IV. DILAUDID 
0.5MG IV Q4H PRN WAS GIVEN AT 20:50H. WILL REASSESS PATIENT AFTER 30MINS.

## 2024-08-27 NOTE — NUR
-CT A/P 1/19 in the ED with Questionable retraction of the percutaneous gastrostomy tube into the abdominal wall versus tenting of the stomach. Developing buried bumper syndrome is not excluded.  -Repeat CT A/P with contrast ordered by ED not showing retraction   -restart tube feeds, increasing rate daily per dietician recs TELE RN CLOSING NOTE



PATIENT IN BED, AWAKE,  A/O X 4, VERBALLY RESPONSIVE AND ABLE TO MAKE NEEDS KNOWN. ON O2 VIA 
NASAL CANNULA AT 6LPM. TOLERATING WELL, BREATHING EVEN AND NON LABORED. WITH EXTERNAL 
CARDIAC MONITOR WITH CURRENT READING OF SR WITH PVC'S HR 80BPM. PATIENT WITH IV ACCESS ON 
EBER ML G18SL, PATENT INTACT AND FLUSHING WELL. WITH HD ACCESS PERFECTO  AVF C/D/I, NO S/SX OF 
BLEEDING NOTED. GENERALIZED EDEMA NOTED. KEPT PATIENT CLEAN,DRY AND COMFORTABLE, ALL DUE 
MEDS GIVEN AS ORDERED AND NURSING CARE/NEEDS ATTENDED WELL. FALL AND SAFETY MEASURES 
MAINTAINED: BED ON LOWEST AND LOCKED POSITION, SIDERAILS UP X2, BED ALARM ON, HOB SLIGHTLY 
ELEVATED,  CALL LIGHT WITHIN EASY REACH .WILL ENDORSED TO AM NURSE FOR CONTINUITY OF CARE.

## 2024-08-27 NOTE — NUR
RN NOTES



PATIENT REFUSED VITAL SIGN TAKEN, DESPITE EXPLAINED AND EDUCATION DONE ABOUT THE BENEFITS. 
CONTINUE MONITORING

## 2024-08-27 NOTE — NUR
RN NOTES



PATIENT C/O LEG 10/10 PAIN SCALE. PT REQUESTED PRN PAINT MEDS, PRN DILAUDID 0.5 MEDS GIVEN 
AS ORDERED  AND 0.5 WASTED WITNESSED BY CESAR ARMAS. CONTINUE TO MONITORING.

-------------------------------------------------------------------------------

Addendum: 08/27/24 at 0400 by DOUGLAS DELEON RN

-------------------------------------------------------------------------------

VITAL SIGN STABLE;/79, MO; 79, TEMP; 98.2, RR; 20, O2SAT; 98%

## 2024-08-27 NOTE — NUR
RN NOTE



Pt verbalized generalized pain with scale 10/10, Dilaudid 0.5mg/0.5ml given and wasted, 
witnessed by Flavia ARMAS, will continue to monitor.

## 2024-08-27 NOTE — NUR
TELE RN OPENING NOTES:



RECEIVED PATIENT AT 20:45H, LYING ON BED, AWAKE, AND VERBALLY RESPONSIVE, A/OX4. RESPIRATION 
EVEN AND NON-LABORED. NO SOB NOTED AT THIS TIME. ON O2 SUPPORT AT 3LPM VIA NASAL CANNULA, 
TOLERATING WELL. WITH GENERALIZED BODY PAIN 9/10. WITH IV ACCESS ON LEFT UPPER ARM MIDLINE 
G#18, SALINE LOCKED, PATENT, INTACT. WITH DIAPER ON. SKIN WARM TO TOUCH, WITH RIGHT LOWER 
LEG ULCERATION. ON RENAL DIET. ON TELEMETRY MONITORING WITH RECENT READING OF SINUS RHYTHM 
WITH PVC'S RANGING AT 80S BPM.  FALL AND SAFETY MEASURES IN PLACE. BED ALARM ON. BED IN LOW 
AND LOCKED POSITION. CALL LIGHT AND TABLE WITHIN  REACH. SIDE RAILS UP X3. PLAN OF CARE 
ONGOING.

## 2024-08-27 NOTE — NUR
RN NOTE



Pt verbalized generalized pain with scale 10/10, Dilaudid 0.5mg/0.5ml given and wasted, 
witnessed by Blanca RN, will continue to monitor.

## 2024-08-27 NOTE — NUR
RN CLOSING NOTE



Pt resting in bed. A/O x 4, no c/o pain/discomfort at this time. On O2 via nc at 3lpm, 
tolerating well. IV access in RFA #20g, sl. On tele monitor with current reading of SR-81. 
Needs attended. Safety measures maintained. Will endorse nolan to night shift.

## 2024-08-27 NOTE — NUR
RN OPENING NOTE



Received pt in bed, awake. A/O x 4, able to make needs known, no c/o pain/discomfort at this 
time. On O2 via nc at 6lpm, tolerating well. IV access in RFA #20g, sl. On tele monitor with 
current reading of SR-80 with pvc's. Safety measures maintained. Will continue with plan of 
care.

## 2024-08-27 NOTE — NUR
NARCOTIC WASTE



WITNESSED WASTING OF 0.5 MG OUT OF 1 MG DILAUDID.  WASTE DONE BY CEFERINO DEVINE RN.

## 2024-08-28 VITALS — OXYGEN SATURATION: 97 %

## 2024-08-28 VITALS — TEMPERATURE: 98.6 F | OXYGEN SATURATION: 96 % | DIASTOLIC BLOOD PRESSURE: 98 MMHG | SYSTOLIC BLOOD PRESSURE: 173 MMHG

## 2024-08-28 VITALS — TEMPERATURE: 98.2 F | OXYGEN SATURATION: 95 % | SYSTOLIC BLOOD PRESSURE: 176 MMHG | DIASTOLIC BLOOD PRESSURE: 99 MMHG

## 2024-08-28 VITALS — DIASTOLIC BLOOD PRESSURE: 102 MMHG | OXYGEN SATURATION: 95 % | TEMPERATURE: 98.2 F | SYSTOLIC BLOOD PRESSURE: 171 MMHG

## 2024-08-28 VITALS — TEMPERATURE: 98.2 F | SYSTOLIC BLOOD PRESSURE: 171 MMHG | DIASTOLIC BLOOD PRESSURE: 102 MMHG | OXYGEN SATURATION: 95 %

## 2024-08-28 VITALS — OXYGEN SATURATION: 95 % | SYSTOLIC BLOOD PRESSURE: 155 MMHG | DIASTOLIC BLOOD PRESSURE: 80 MMHG

## 2024-08-28 VITALS — OXYGEN SATURATION: 98 %

## 2024-08-28 VITALS — OXYGEN SATURATION: 97 % | DIASTOLIC BLOOD PRESSURE: 68 MMHG | SYSTOLIC BLOOD PRESSURE: 146 MMHG | TEMPERATURE: 98.6 F

## 2024-08-28 VITALS — SYSTOLIC BLOOD PRESSURE: 144 MMHG | TEMPERATURE: 98.1 F | OXYGEN SATURATION: 96 % | DIASTOLIC BLOOD PRESSURE: 72 MMHG

## 2024-08-28 VITALS — OXYGEN SATURATION: 98 % | DIASTOLIC BLOOD PRESSURE: 85 MMHG | SYSTOLIC BLOOD PRESSURE: 133 MMHG | TEMPERATURE: 98.6 F

## 2024-08-28 VITALS — OXYGEN SATURATION: 95 % | DIASTOLIC BLOOD PRESSURE: 99 MMHG | SYSTOLIC BLOOD PRESSURE: 176 MMHG | TEMPERATURE: 98.2 F

## 2024-08-28 VITALS — DIASTOLIC BLOOD PRESSURE: 70 MMHG | OXYGEN SATURATION: 98 % | SYSTOLIC BLOOD PRESSURE: 150 MMHG

## 2024-08-28 VITALS — DIASTOLIC BLOOD PRESSURE: 87 MMHG | SYSTOLIC BLOOD PRESSURE: 156 MMHG

## 2024-08-28 VITALS — OXYGEN SATURATION: 94 %

## 2024-08-28 LAB
ALBUMIN SERPL BCP-MCNC: 3 G/DL (ref 3.4–5)
ALP SERPL-CCNC: 153 U/L (ref 46–116)
ALT SERPL W P-5'-P-CCNC: 31 U/L (ref 12–78)
AST SERPL W P-5'-P-CCNC: 17 U/L (ref 15–37)
BASOPHILS # BLD AUTO: 0.1 K/UL (ref 0–0.2)
BASOPHILS NFR BLD AUTO: 1.3 % (ref 0–2)
BILIRUB SERPL-MCNC: 0.4 MG/DL (ref 0.2–1)
BUN SERPL-MCNC: 45 MG/DL (ref 7–18)
CALCIUM SERPL-MCNC: 9.6 MG/DL (ref 8.5–10.1)
CHLORIDE SERPL-SCNC: 96 MMOL/L (ref 98–107)
CO2 SERPL-SCNC: 28 MMOL/L (ref 21–32)
CREAT SERPL-MCNC: 4.3 MG/DL (ref 0.6–1.3)
EOSINOPHIL # BLD AUTO: 0.6 K/UL (ref 0–0.7)
EOSINOPHIL NFR BLD AUTO: 6.3 % (ref 0–6)
ERYTHROCYTE [DISTWIDTH] IN BLOOD BY AUTOMATED COUNT: 19.4 % (ref 11.5–15)
GLUCOSE SERPL-MCNC: 126 MG/DL (ref 74–106)
HCT VFR BLD AUTO: 26 % (ref 33–45)
HGB BLD-MCNC: 7.8 G/DL (ref 11.5–14.8)
LYMPHOCYTES NFR BLD AUTO: 0.5 K/UL (ref 0.8–4.8)
LYMPHOCYTES NFR BLD AUTO: 4.9 % (ref 20–44)
MCH RBC QN AUTO: 28 PG (ref 26–33)
MCHC RBC AUTO-ENTMCNC: 31 G/DL (ref 31–36)
MCV RBC AUTO: 93 FL (ref 82–100)
MONOCYTES NFR BLD AUTO: 0.8 K/UL (ref 0.1–1.3)
MONOCYTES NFR BLD AUTO: 8.8 % (ref 2–12)
NEUTROPHILS # BLD AUTO: 7.3 K/UL (ref 1.8–8.9)
NEUTROPHILS NFR BLD AUTO: 78.7 % (ref 43–81)
PLATELET # BLD AUTO: 347 K/UL (ref 150–450)
POTASSIUM SERPL-SCNC: 4.5 MMOL/L (ref 3.5–5.1)
PROT SERPL-MCNC: 7.2 G/DL (ref 6.4–8.2)
RBC # BLD AUTO: 2.77 MIL/UL (ref 4–5.2)
SODIUM SERPL-SCNC: 133 MMOL/L (ref 136–145)
WBC NRBC COR # BLD AUTO: 9.3 K/UL (ref 4.3–11)

## 2024-08-28 RX ADMIN — Medication PRN OZ: at 08:21

## 2024-08-28 NOTE — NUR
TELE RN NOTES

PULLED OUT DILAUDID 1MG, 0.5MG/0.5ml ADMINISTERED AS ORDERED,0.5ML WASTED DUMPED ON RX 
DESTROYER WITNESSED BY RN OPAL.

## 2024-08-28 NOTE — NUR
RN NOTES



PATIENT STARTED HEMODIALYSIS WITH HD NURSE BIRD 1700H. HELD SCHEDULED 1700H AND 1800H 
MEDICATIONS DUE TO HEMODIALYSIS. NO SIGNS OF ACUTE DISTRESS NOTED. WILL CONTINUE WITH PLAN 
OF CARE.

## 2024-08-28 NOTE — NUR
TELE RN CLOSING NOTES:



PATIENT LYING ON BED, AWAKE, AND VERBALLY RESPONSIVE, A/OX4. RESPIRATION EVEN AND 
NON-LABORED. NO SOB NOTED AT THIS TIME. ON O2 SUPPORT AT 3LPM VIA NASAL CANNULA, TOLERATING 
WELL. WITH GENERALIZED BODY PAIN 9/10. WITH IV ACCESS ON LEFT UPPER ARM MIDLINE G#18, SALINE 
LOCKED, PATENT, INTACT. WITH DIAPER ON. SKIN WARM TO TOUCH, WITH RIGHT LOWER LEG ULCERATION, 
NO WORSENING OF CURRENT SKIN CONDITION NOTED AT THIS TIME. ON RENAL DIET. ON TELEMETRY 
MONITORING WITH RECENT READING OF SINUS RHYTHM RANGING AT 80S BPM.  CHECKED BLOOD SUGAR 
LEVELS AND FOLLOWED INSULIN SLIDING SCALE AS ORDERED. PAIN AND BP CONTROLLED WITH IV 
MEDICATION. DUE MEDS GIVEN. KEPT CLEAN AND COMFORTABLE. ENCOURAGED VERBALIZATION OF NEEDS 
AND ATTENDED PROMPTLY.FALL AND SAFETY MEASURES IN PLACE. BED ALARM ON. BED IN LOW AND LOCKED 
POSITION. CALL LIGHT AND TABLE WITHIN  REACH. SIDE RAILS UP X3. WILL ENDORSE TO THE NEXT 
NURSE ON DUTY FOR CONTINUITY OF CARE.

## 2024-08-28 NOTE — NUR
RN NOTES



PATIENT COMPLAINED OF PAIN IN HER RIGHT LOWER LEG. DILAUDID 0.5MG(0.5ml) GIVEN AS PRN 
MEDICATION FOR PAIN. RN NII WITNESSED WASTAGE OF 0.5MG(0.5ML) OUT OF 1MG(1ML) OF 
DILAUDID IN RX DESTROYER.

## 2024-08-28 NOTE — NUR
JOANN RN NOTES:



PATIENT COMPLAINED OF GENERALIZED BODY PAIN 9/10. VITALS CHECKED AND DOCUMENTED. DILAUDID 
0.5MG IV WAS PULLED OUT FROM OMNICELL AT 01:04AM AND MS. LUCÍA RN WITNESSED WASTING OF 
0.5MG DILAUDID OUT OF 1MG IV. DILAUDID 0.5MG IV Q4H PRN WAS GIVEN AT AT 1:06AM. WILL 
REASSESS PATIENT AFTER 30MINS. 

-------------------------------------------------------------------------------

Addendum: 08/28/24 at 0116 by CEFERINO DEVINE RN

-------------------------------------------------------------------------------

PAIN SCORE 8-9/10 AS STATED BY THE PATIENT

## 2024-08-28 NOTE — NUR
Patient refused nebulizer treatment. States it makes her nauseous. Patient is on 3L nasal 
cannula, SpO2 94% and tolerating. Notified RN.

## 2024-08-28 NOTE — NUR
Please advise.      TELE RN NOTES:



PATIENT COMPLAINED OF RIGHT LOWER LEG PAIN 9/10. VITALS CHECKED AND DOCUMENTED. DILAUDID 
0.5MG IV WAS PULLED OUT FROM OMNICELL AT 05:05AM AND MR. DELON RN WITNESSED WASTING OF 
0.5MG DILAUDID OUT OF 1MG IV. DILAUDID 0.5MG IV Q4H PRN WAS GIVEN AT AT 5:07AM. WILL 
REASSESS PATIENT AFTER 30MINS.

## 2024-08-28 NOTE — NUR
TELE RN NOTES

RECEIVED LAYING ON BED,A/O C4,S/P HEMODIALYSIS TODAY,2 LITERS OUT.NO SOB NOTED,RIGHT LOWER 
LEG WOUND WITH DRESSING INTACT AND DRY.WITH LEFT UPPER ARM MIDLINE FOR MEDS.WITH RIGHT AV 
SHUNT FOR HD ACCESS.PAIN TOLERABLE AT THE MOMENT.WILL CONTINUE TO MONITOR STATUS.

## 2024-08-28 NOTE — NUR
TELE RN NOTES  PAIN MANAGEMENT

C/O PAIN ON THE RIGHT LOWER LEG 8/10 ON PAIN SCALE,MEDICATED WITH DILAUDID 0.5MG IV AS 
ORDERED.VITAL SIGNS STABLE.

## 2024-08-28 NOTE — NUR
TELE RN NOTES:



CLONIDINE HCL 0.1MG PO Q6H PRN WAS GIVEN FOR HIGH BLOOD PRESSURE AT THIS TIME: 171/102MMHG. 
WILL REASSESS AFTER 1 HOUR.

## 2024-08-28 NOTE — NUR
PATIENT REFUSED.

-------------------------------------------------------------------------------

Addendum: 08/28/24 at 1504 by LISA EMERY RT

-------------------------------------------------------------------------------

Amended: Links added.

## 2024-08-28 NOTE — NUR
TELE RN OPENING NOTES



RECEIVED PATIENT IN BED, ASLEEP BUT EASY TO AWAKEN. A/O X4. ABLE TO MAKE NEEDS KNOWN. NO 
SIGNS OF ACUTE DISTRESS NOTED. ON O2 INHALATION VIA NASAL CANNULA AT 3LPM, TOLERATING WELL. 
NO SOB, BREATHING EVEN AND UNLABORED.ON TELEMETRY MONITORING WITH CURRENT READING OF SINUS 
RHYTHM WITH HEART RATE OF 77BPM. WITH LEFT UPPER ARM MIDLINE #18G-SALINE LOCKED; INTACT, 
PATENT AND FLUSHES WELL. SAFETY MEASURES IN PLACE. BED IN LOW AND LOCKED POSITION. CALL 
LIGHT AND TABLE WITHIN REACH. SIDE RAILS UP X2. HOB ELEVATED. WILL CONTINUE WITH PLAN OF 
CARE.

## 2024-08-28 NOTE — NUR
RN NOTES



PATIENT COMPLAINED OF PAIN IN HER RIGHT LOWER LEG. DILAUDID 0.5MG90.5ml) GIVEN AS PRN 
MEDICATION FOR PAIN. CHANDA MIRELES WITNESSED WASTAGE OF 0.5MG(0.5ML) OUT OF 1MG(1ML) OF DILAUDID 
IN RX DESTROYER.

## 2024-08-28 NOTE — NUR
TELE RN NOTES  ACCU-CHECK

BLOOD SUGAR CHECK 180MG/DL,REFUSED 50UNITS OF LANTUS,GIVEN 3 UNITS OF HUMULIN R SQ ON LEFT 
LOWER ABDOMEN PER SLIDING SCALE AND PER PATIENT REQUEST.

## 2024-08-28 NOTE — NUR
RN NOTES



PATIENT REFUSED SCHEDULED INSULIN LANTUS(PATIENT SAID THAT HER BLOOD SUGAR IS ONLY 115 AND 
SHE DOESN'T NEED IT) AND BREO ELLIPTA INHALER(PATIENT STATED THAT THIS MEDICATION MAKE HER 
THROW UP). EXPLAINED RISKS OF NOT TAKING THIS MEDICATIONS BUT PATIENT STILL REFUSING. WILL 
CONTINUE WITH PLAN OF CARE.

## 2024-08-28 NOTE — NUR
TELE RN NOTES:



PATIENT COMPLAINED OF GENERALIZED BODY PAIN 3/10. VITALS CHECKED AND DOCUMENTED. TYLENOL 
650MG PO Q6H PRN WAS GIVEN AT THIS TIME. WILL REASSESS AFTER 1 HOUR.

## 2024-08-28 NOTE — NUR
RN NOTES



WITNESSED CHANDA CAMACHO WASTAGE OF 0.5MG(0.5ML) OUT OF 1MG(1ML) DILAUDID IN RX DESTROYER.

## 2024-08-28 NOTE — NUR
PATIENT FOUND ON 3L O2 VIA N.C. PATIENT REFUSED TREATMENT. NO DISTRESS NOTED.

-------------------------------------------------------------------------------

Addendum: 08/28/24 at 0749 by LISA EMERY RT

-------------------------------------------------------------------------------

Amended: Links added.

## 2024-08-28 NOTE — NUR
TELE RN CLOSING NOTES



PATIENT IN BED, ASLEEP BUT EASY TO AWAKEN. A/O X4. WAS ABLE TO MAKE NEEDS KNOWN. NO SIGNS OF 
ACUTE DISTRESS NOTED. ON O2 INHALATION VIA NASAL CANNULA AT 3LPM, TOLERATING WELL. NO SOB, 
BREATHING EVEN AND UNLABORED.ON TELEMETRY MONITORING WITH CURRENT READING OF SINUS RHYTHM 
WITH HEART RATE OF 77BPM. WITH LEFT UPPER ARM MIDLINE #18G-SALINE LOCKED; INTACT, PATENT AND 
FLUSHES WELL. HEMODIALYSIS ENDED AT 1900H WITH OUTPUT OF 2000CC-PATIENT TOLERATED WELL. 
PATIENT ASKED FOR TYLENOL DUE TO HER PAIN IN HER RIGHT LEG. DUE MEDS GIVEN. NURSING CARE AND 
WOUND CARE RENDERED. SAFETY MEASURES IMPLEMENTED. BED IN LOW AND LOCKED POSITION. CALL LIGHT 
AND TABLE WITHIN REACH. SIDE RAILS UP X2. HOB ELEVATED. ENDORSED TO NIGHT SHIFT NURSE FOR 
CONTINUITY OF CARE.

## 2024-08-28 NOTE — NUR
TELE RN NOTES:



RECHECKED PATIENT'S BLOOD PRESSURE MANUALLY AFTER PAIN MEDICATIONS. LATEST: 150/70MMHG.

## 2024-08-29 VITALS — SYSTOLIC BLOOD PRESSURE: 182 MMHG | OXYGEN SATURATION: 100 % | TEMPERATURE: 97.7 F | DIASTOLIC BLOOD PRESSURE: 97 MMHG

## 2024-08-29 VITALS — TEMPERATURE: 98.4 F | OXYGEN SATURATION: 98 % | DIASTOLIC BLOOD PRESSURE: 96 MMHG | SYSTOLIC BLOOD PRESSURE: 169 MMHG

## 2024-08-29 VITALS — DIASTOLIC BLOOD PRESSURE: 76 MMHG | SYSTOLIC BLOOD PRESSURE: 138 MMHG

## 2024-08-29 VITALS — SYSTOLIC BLOOD PRESSURE: 180 MMHG | TEMPERATURE: 98.1 F | OXYGEN SATURATION: 98 % | DIASTOLIC BLOOD PRESSURE: 83 MMHG

## 2024-08-29 VITALS — DIASTOLIC BLOOD PRESSURE: 84 MMHG | SYSTOLIC BLOOD PRESSURE: 147 MMHG

## 2024-08-29 VITALS — OXYGEN SATURATION: 100 % | TEMPERATURE: 98.1 F | DIASTOLIC BLOOD PRESSURE: 83 MMHG | SYSTOLIC BLOOD PRESSURE: 193 MMHG

## 2024-08-29 VITALS — OXYGEN SATURATION: 96 %

## 2024-08-29 VITALS — OXYGEN SATURATION: 98 %

## 2024-08-29 LAB
ALBUMIN SERPL BCP-MCNC: 3.2 G/DL (ref 3.4–5)
ALP SERPL-CCNC: 145 U/L (ref 46–116)
ALT SERPL W P-5'-P-CCNC: 29 U/L (ref 12–78)
AST SERPL W P-5'-P-CCNC: 15 U/L (ref 15–37)
BASOPHILS # BLD AUTO: 0.1 K/UL (ref 0–0.2)
BASOPHILS NFR BLD AUTO: 0.9 % (ref 0–2)
BILIRUB SERPL-MCNC: 0.4 MG/DL (ref 0.2–1)
BUN SERPL-MCNC: 52 MG/DL (ref 7–18)
CALCIUM SERPL-MCNC: 9.7 MG/DL (ref 8.5–10.1)
CHLORIDE SERPL-SCNC: 97 MMOL/L (ref 98–107)
CO2 SERPL-SCNC: 29 MMOL/L (ref 21–32)
CREAT SERPL-MCNC: 4.4 MG/DL (ref 0.6–1.3)
EOSINOPHIL # BLD AUTO: 0.4 K/UL (ref 0–0.7)
EOSINOPHIL NFR BLD AUTO: 4 % (ref 0–6)
ERYTHROCYTE [DISTWIDTH] IN BLOOD BY AUTOMATED COUNT: 19.4 % (ref 11.5–15)
GLUCOSE SERPL-MCNC: 150 MG/DL (ref 74–106)
HCT VFR BLD AUTO: 25 % (ref 33–45)
HGB BLD-MCNC: 8 G/DL (ref 11.5–14.8)
LYMPHOCYTES NFR BLD AUTO: 0.4 K/UL (ref 0.8–4.8)
LYMPHOCYTES NFR BLD AUTO: 3.8 % (ref 20–44)
MCH RBC QN AUTO: 29 PG (ref 26–33)
MCHC RBC AUTO-ENTMCNC: 32 G/DL (ref 31–36)
MCV RBC AUTO: 91 FL (ref 82–100)
MONOCYTES NFR BLD AUTO: 0.7 K/UL (ref 0.1–1.3)
MONOCYTES NFR BLD AUTO: 6.9 % (ref 2–12)
NEUTROPHILS # BLD AUTO: 8.4 K/UL (ref 1.8–8.9)
NEUTROPHILS NFR BLD AUTO: 84.4 % (ref 43–81)
PLATELET # BLD AUTO: 353 K/UL (ref 150–450)
POTASSIUM SERPL-SCNC: 4.9 MMOL/L (ref 3.5–5.1)
PROT SERPL-MCNC: 7.5 G/DL (ref 6.4–8.2)
RBC # BLD AUTO: 2.79 MIL/UL (ref 4–5.2)
SODIUM SERPL-SCNC: 134 MMOL/L (ref 136–145)
WBC NRBC COR # BLD AUTO: 9.9 K/UL (ref 4.3–11)

## 2024-08-29 NOTE — NUR
TELE RN NOTES

SR-86 ON TELE MONITOR.SLEEP WELL AT NIGHT NO SOB NOTED,REMAINS EDEMATOUS,REFUSE BLOOD DRAW 
THIS MORNING. WILL COME BACK LATER IN THE DAY.IN NO ACUTE DISTRESS.

## 2024-08-29 NOTE — NUR
TELE RN OPENING NOTES



RECEIVED PATIENT IN BED, AWAKE. A/O X4. ABLE TO MAKE NEEDS KNOWN. NO SIGNS OF ACUTE DISTRESS 
NOTED. ON O2 INHALATION VIA NASAL CANNULA AT 3LPM, TOLERATING WELL. NO SOB, BREATHING EVEN 
AND UNLABORED.ON TELEMETRY MONITORING WITH CURRENT READING OF SINUS RHYTHM WITH PVC'S HEART 
RATE OF 87BPM. WITH LEFT UPPER ARM MIDLINE #18G-SALINE LOCKED; INTACT, PATENT AND FLUSHES 
WELL. WITH RIGHT AV FISTULA-C/D WITH (+) BRUIT). SAFETY MEASURES IN PLACE. BED IN LOW AND 
LOCKED POSITION. CALL LIGHT AND TABLE WITHIN REACH. SIDE RAILS UP X2. HOB ELEVATED. WILL 
CONTINUE WITH PLAN OF CARE.

## 2024-08-29 NOTE — NUR
RN DISCHARGE NOTES



PATIENT WAS SEEN AND ORDERED FOR DISCHARGE BY DR. SINA GASTELUM. PATIENT WILL BE 
DISCHARGE TO HOME. PATIENT IS A/O X4, ON O2 INHALATION AT 3LPM. PATIENT HAS A PORTABLE O2 
MACHINE WHICH HER SON LAYO BROUGHT IN. PATIENT SIGNED THE DISCHARGE FORMS-BELONGINGS WERE 
ACCOUNTED FOR, FORM SIGNED BY PATIENT AND FILED TO CHART. MIDLINE ACCESS AND NAME WRISTBAND 
REMOVED. WOUND DRESSING CHANGE PRIOR TO DISCHARGE. INSTRUCTED PATIENT TO CONTINUE TAKING HER 
HOME MEDICATIONS AND FOLLOW THE HEMODIALYSIS SCHEDULE. PEDRO VASQUES PICKED UP THE PATIENT IN 
STABLE CONDITION. PATIENT LEFT THE UNIT VIA WHEELCHAIR WITH [facility]NA PAGESEF IN STABLE CONDITION. 
CHARGE NURSE AND MD MADE AWARE OF THE DISCHARGE.

## 2024-08-29 NOTE — NUR
TELE RN NOTES  ACCU-CHECK

BLOOD SUGAR CHECK 138MG/DL,COVERED WITH HUMULIN R 2 UNITS PER SLIDING SCALE.

## 2024-08-29 NOTE — NUR
RN NOTES



PATIENT COMPLAINED OF PAIN IN HER RIGHT LOWER LEG. DILAUDID 0.5MG90.5ml) GIVEN AS PRN 
MEDICATION FOR PAIN. CHANDA DAVIES WITNESSED WASTAGE OF 0.5MG(0.5ML) OUT OF 1MG(1ML) OF 
DILAUDID IN RX DESTROYER.
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
## 2024-08-29 NOTE — NUR
TELE RN NOTES

BP-193/83,MEDICATED WITH CLONIDINE 0.1MGPO AS ORDERED FOR SBP ABOVE 170.WILL CONTINUE TO 
MONITOR.

## 2024-09-05 ENCOUNTER — HOSPITAL ENCOUNTER (OUTPATIENT)
Dept: HOSPITAL 54 - WOU | Age: 49
Discharge: HOME HEALTH | End: 2024-09-05
Attending: STUDENT IN AN ORGANIZED HEALTH CARE EDUCATION/TRAINING PROGRAM
Payer: MEDICARE

## 2024-09-05 DIAGNOSIS — L08.9: ICD-10-CM

## 2024-09-05 DIAGNOSIS — N18.6: ICD-10-CM

## 2024-09-05 DIAGNOSIS — I70.232: Primary | ICD-10-CM

## 2024-09-05 DIAGNOSIS — I12.0: ICD-10-CM

## 2024-09-05 DIAGNOSIS — L97.213: ICD-10-CM

## 2024-09-05 DIAGNOSIS — E11.51: ICD-10-CM

## 2024-09-05 DIAGNOSIS — Z99.2: ICD-10-CM

## 2024-09-05 DIAGNOSIS — E11.22: ICD-10-CM

## 2024-09-05 DIAGNOSIS — R60.0: ICD-10-CM

## 2024-09-05 DIAGNOSIS — L88: ICD-10-CM

## 2024-09-05 DIAGNOSIS — Z79.4: ICD-10-CM

## 2024-09-05 PROCEDURE — 11043 DBRDMT MUSC&/FSCA 1ST 20/<: CPT

## 2024-09-12 ENCOUNTER — HOSPITAL ENCOUNTER (EMERGENCY)
Dept: HOSPITAL 54 - ER | Age: 49
Discharge: HOME | End: 2024-09-12
Payer: MEDICARE

## 2024-09-12 VITALS — BODY MASS INDEX: 46.1 KG/M2 | WEIGHT: 270 LBS | HEIGHT: 64 IN

## 2024-09-12 VITALS — SYSTOLIC BLOOD PRESSURE: 158 MMHG | OXYGEN SATURATION: 94 % | DIASTOLIC BLOOD PRESSURE: 107 MMHG

## 2024-09-12 VITALS — TEMPERATURE: 97.9 F

## 2024-09-12 DIAGNOSIS — G89.29: Primary | ICD-10-CM

## 2024-09-12 DIAGNOSIS — Z88.0: ICD-10-CM

## 2024-09-12 DIAGNOSIS — Z86.73: ICD-10-CM

## 2024-09-12 DIAGNOSIS — N18.9: ICD-10-CM

## 2024-09-12 DIAGNOSIS — Z87.09: ICD-10-CM

## 2024-09-12 DIAGNOSIS — Z99.2: ICD-10-CM

## 2024-09-12 DIAGNOSIS — E11.22: ICD-10-CM

## 2024-09-12 DIAGNOSIS — I12.9: ICD-10-CM

## 2024-09-12 DIAGNOSIS — M54.59: ICD-10-CM

## 2024-12-03 NOTE — NUR
Nurse returned [facility] call  Spoke to Faustino the pharmacist.  Pt verified by name and .  Pharmacy calling for clarification on pt's ferrous sulfate rx.  He is aware per Jelly Jewell rx should be BID.     RN NOTES



PATIENT COMPLAINED OF PAIN IN HER RIGHT LOWER LEG AND PATIENT ASKED FOR PAIN MEDICATION 
PRIOR TO WOUND DRESSING CHANGE. DILAUDID 0.5MG(0.5ml) GIVEN AS PRN MEDICATION FOR PAIN. CHANDA BALES WITNESSED WASTAGE OF 0.5MG(0.5ML) OUT OF 1MG(1ML) OF DILAUDID IN RX DESTROYER.

## 2025-02-20 ENCOUNTER — HOSPITAL ENCOUNTER (INPATIENT)
Dept: HOSPITAL 54 - ER | Age: 50
LOS: 6 days | Discharge: HOME HEALTH | DRG: 264 | End: 2025-02-26
Attending: INTERNAL MEDICINE | Admitting: INTERNAL MEDICINE
Payer: MEDICARE

## 2025-02-20 VITALS — SYSTOLIC BLOOD PRESSURE: 133 MMHG | DIASTOLIC BLOOD PRESSURE: 53 MMHG | OXYGEN SATURATION: 97 % | TEMPERATURE: 97.7 F

## 2025-02-20 VITALS — WEIGHT: 291 LBS | BODY MASS INDEX: 49.68 KG/M2 | HEIGHT: 64 IN

## 2025-02-20 DIAGNOSIS — I50.23: ICD-10-CM

## 2025-02-20 DIAGNOSIS — E87.1: ICD-10-CM

## 2025-02-20 DIAGNOSIS — Z86.73: ICD-10-CM

## 2025-02-20 DIAGNOSIS — E66.01: ICD-10-CM

## 2025-02-20 DIAGNOSIS — Z86.718: ICD-10-CM

## 2025-02-20 DIAGNOSIS — L03.115: ICD-10-CM

## 2025-02-20 DIAGNOSIS — L03.116: ICD-10-CM

## 2025-02-20 DIAGNOSIS — L89.210: ICD-10-CM

## 2025-02-20 DIAGNOSIS — J96.01: ICD-10-CM

## 2025-02-20 DIAGNOSIS — Z99.2: ICD-10-CM

## 2025-02-20 DIAGNOSIS — D63.8: ICD-10-CM

## 2025-02-20 DIAGNOSIS — E11.22: ICD-10-CM

## 2025-02-20 DIAGNOSIS — T24.012A: ICD-10-CM

## 2025-02-20 DIAGNOSIS — Z79.4: ICD-10-CM
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
## 2025-02-20 DIAGNOSIS — I87.313: ICD-10-CM

## 2025-02-20 DIAGNOSIS — R53.1: ICD-10-CM

## 2025-02-20 DIAGNOSIS — L98.9: ICD-10-CM

## 2025-02-20 DIAGNOSIS — L97.923: ICD-10-CM

## 2025-02-20 DIAGNOSIS — L97.913: ICD-10-CM

## 2025-02-20 DIAGNOSIS — Z88.0: ICD-10-CM

## 2025-02-20 DIAGNOSIS — L89.890: ICD-10-CM

## 2025-02-20 DIAGNOSIS — Z95.1: ICD-10-CM
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
## 2025-02-20 DIAGNOSIS — M89.8X9: ICD-10-CM

## 2025-02-20 DIAGNOSIS — E44.0: ICD-10-CM

## 2025-02-20 DIAGNOSIS — T31.0: ICD-10-CM

## 2025-02-20 DIAGNOSIS — Z79.84: ICD-10-CM

## 2025-02-20 DIAGNOSIS — Z82.49: ICD-10-CM

## 2025-02-20 DIAGNOSIS — I13.2: Primary | ICD-10-CM

## 2025-02-20 DIAGNOSIS — Z20.822: ICD-10-CM

## 2025-02-20 DIAGNOSIS — E88.09: ICD-10-CM

## 2025-02-20 DIAGNOSIS — E78.5: ICD-10-CM

## 2025-02-20 DIAGNOSIS — S30.820A: ICD-10-CM

## 2025-02-20 DIAGNOSIS — Z87.891: ICD-10-CM

## 2025-02-20 DIAGNOSIS — Z98.890: ICD-10-CM
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
## 2025-02-20 DIAGNOSIS — E86.1: ICD-10-CM

## 2025-02-20 DIAGNOSIS — I25.10: ICD-10-CM

## 2025-02-20 DIAGNOSIS — S70.222A: ICD-10-CM

## 2025-02-20 DIAGNOSIS — N18.6: ICD-10-CM

## 2025-02-20 DIAGNOSIS — Z79.899: ICD-10-CM

## 2025-02-20 LAB
ALBUMIN SERPL BCP-MCNC: 2.3 G/DL (ref 3.4–5)
ALP SERPL-CCNC: 425 U/L (ref 46–116)
ALT SERPL W P-5'-P-CCNC: 30 U/L (ref 12–78)
AST SERPL W P-5'-P-CCNC: 34 U/L (ref 15–37)
BASE EXCESS BLDA CALC-SCNC: 2.7 MMOL/L (ref -2–3)
BASOPHILS # BLD AUTO: 0.1 K/UL (ref 0–0.2)
BASOPHILS NFR BLD AUTO: 1.1 % (ref 0–2)
BILIRUB DIRECT SERPL-MCNC: 0.3 MG/DL (ref 0–0.2)
BILIRUB SERPL-MCNC: 0.6 MG/DL (ref 0.2–1)
BUN SERPL-MCNC: 34 MG/DL (ref 7–18)
CALCIUM SERPL-MCNC: 9 MG/DL (ref 8.5–10.1)
CHLORIDE SERPL-SCNC: 92 MMOL/L (ref 98–107)
CO2 SERPL-SCNC: 29 MMOL/L (ref 21–32)
CREAT SERPL-MCNC: 3.9 MG/DL (ref 0.6–1.3)
EOSINOPHIL # BLD AUTO: 0.1 K/UL (ref 0–0.7)
EOSINOPHIL NFR BLD AUTO: 0.9 % (ref 0–6)
ERYTHROCYTE [DISTWIDTH] IN BLOOD BY AUTOMATED COUNT: 19 % (ref 11.5–15)
GAS PNL BLDA: 7.8 G/DL (ref 12–16)
GLUCOSE SERPL-MCNC: 213 MG/DL (ref 74–106)
HCT VFR BLD AUTO: 23 % (ref 33–45)
HGB BLD-MCNC: 7.1 G/DL (ref 11.5–14.8)
INHALED O2 CONCENTRATION: 44 %
INHALED O2 FLOW RATE: 6 L/MIN (ref 0–30)
LACTATE SERPL-SCNC: 0.9 MMOL/L (ref 0.4–2)
LYMPHOCYTES NFR BLD AUTO: 0.5 K/UL (ref 0.8–4.8)
LYMPHOCYTES NFR BLD AUTO: 4.4 % (ref 20–44)
MCH RBC QN AUTO: 30 PG (ref 26–33)
MCHC RBC AUTO-ENTMCNC: 31 G/DL (ref 31–36)
MCV RBC AUTO: 95 FL (ref 82–100)
MONOCYTES NFR BLD AUTO: 1.2 K/UL (ref 0.1–1.3)
MONOCYTES NFR BLD AUTO: 10.9 % (ref 2–12)
NEUTROPHILS # BLD AUTO: 9.3 K/UL (ref 1.8–8.9)
NEUTROPHILS NFR BLD AUTO: 82.7 % (ref 43–81)
PCO2 TEMP ADJ BLDA: 38.2 MMHG (ref 32–45)
PH TEMP ADJ BLDA: 7.46 [PH] (ref 7.35–7.45)
PLATELET # BLD AUTO: 416 K/UL (ref 150–450)
PO2 TEMP ADJ BLDA: 85 MMHG (ref 83–108)
POTASSIUM SERPL-SCNC: 5 MMOL/L (ref 3.5–5.1)
PROT SERPL-MCNC: 6.5 G/DL (ref 6.4–8.2)
RBC # BLD AUTO: 2.41 MIL/UL (ref 4–5.2)
SAO2 % BLDA: 95.8 % (ref 94–98)
SODIUM SERPL-SCNC: 128 MMOL/L (ref 136–145)
SPECIMEN DRAWN FROM PATIENT: (no result)
WBC NRBC COR # BLD AUTO: 11.3 K/UL (ref 4.3–11)

## 2025-02-20 PROCEDURE — G0378 HOSPITAL OBSERVATION PER HR: HCPCS

## 2025-02-20 PROCEDURE — A4223 INFUSION SUPPLIES W/O PUMP: HCPCS

## 2025-02-20 PROCEDURE — A6403 STERILE GAUZE>16 <= 48 SQ IN: HCPCS

## 2025-02-20 PROCEDURE — A6253 ABSORPT DRG > 48 SQ IN W/O B: HCPCS

## 2025-02-20 RX ADMIN — MONTELUKAST SODIUM SCH MG: 10 TABLET, FILM COATED ORAL at 22:44

## 2025-02-20 RX ADMIN — GABAPENTIN SCH MG: 300 CAPSULE ORAL at 22:44

## 2025-02-20 RX ADMIN — INSULIN HUMAN PRN UNITS: 100 INJECTION, SOLUTION PARENTERAL at 22:50

## 2025-02-20 RX ADMIN — Medication ONE MG: at 17:41

## 2025-02-20 RX ADMIN — DEXTROSE MONOHYDRATE ONE MG: 50 INJECTION, SOLUTION INTRAVENOUS at 17:41

## 2025-02-20 RX ADMIN — Medication SCH EACH: at 22:50

## 2025-02-20 RX ADMIN — INSULIN GLARGINE SCH UNIT: 100 INJECTION, SOLUTION SUBCUTANEOUS at 21:00

## 2025-02-20 RX ADMIN — Medication PRN MG: at 21:59

## 2025-02-21 VITALS — OXYGEN SATURATION: 98 % | SYSTOLIC BLOOD PRESSURE: 110 MMHG | DIASTOLIC BLOOD PRESSURE: 88 MMHG | TEMPERATURE: 98.1 F

## 2025-02-21 VITALS — TEMPERATURE: 98.2 F | OXYGEN SATURATION: 99 % | SYSTOLIC BLOOD PRESSURE: 144 MMHG | DIASTOLIC BLOOD PRESSURE: 64 MMHG

## 2025-02-21 VITALS — OXYGEN SATURATION: 99 % | DIASTOLIC BLOOD PRESSURE: 72 MMHG | TEMPERATURE: 98.1 F | SYSTOLIC BLOOD PRESSURE: 157 MMHG

## 2025-02-21 VITALS — TEMPERATURE: 98.2 F | DIASTOLIC BLOOD PRESSURE: 64 MMHG | OXYGEN SATURATION: 99 % | SYSTOLIC BLOOD PRESSURE: 144 MMHG

## 2025-02-21 VITALS — DIASTOLIC BLOOD PRESSURE: 64 MMHG | TEMPERATURE: 98.6 F | SYSTOLIC BLOOD PRESSURE: 106 MMHG | OXYGEN SATURATION: 98 %

## 2025-02-21 VITALS — TEMPERATURE: 98.2 F | DIASTOLIC BLOOD PRESSURE: 46 MMHG | OXYGEN SATURATION: 97 % | SYSTOLIC BLOOD PRESSURE: 137 MMHG

## 2025-02-21 VITALS — OXYGEN SATURATION: 100 % | TEMPERATURE: 97.9 F | DIASTOLIC BLOOD PRESSURE: 80 MMHG | SYSTOLIC BLOOD PRESSURE: 149 MMHG

## 2025-02-21 VITALS — TEMPERATURE: 97.9 F | OXYGEN SATURATION: 100 % | DIASTOLIC BLOOD PRESSURE: 80 MMHG | SYSTOLIC BLOOD PRESSURE: 144 MMHG

## 2025-02-21 LAB
BASOPHILS # BLD AUTO: 0.1 K/UL (ref 0–0.2)
BASOPHILS NFR BLD AUTO: 0.8 % (ref 0–2)
BUN SERPL-MCNC: 37 MG/DL (ref 7–18)
CALCIUM SERPL-MCNC: 9.1 MG/DL (ref 8.5–10.1)
CHLORIDE SERPL-SCNC: 92 MMOL/L (ref 98–107)
CO2 SERPL-SCNC: 29 MMOL/L (ref 21–32)
CREAT SERPL-MCNC: 4.4 MG/DL (ref 0.6–1.3)
EOSINOPHIL # BLD AUTO: 0.3 K/UL (ref 0–0.7)
EOSINOPHIL NFR BLD AUTO: 3.1 % (ref 0–6)
ERYTHROCYTE [DISTWIDTH] IN BLOOD BY AUTOMATED COUNT: 18.7 % (ref 11.5–15)
GLUCOSE SERPL-MCNC: 198 MG/DL (ref 74–106)
HCT VFR BLD AUTO: 22 % (ref 33–45)
HGB BLD-MCNC: 7 G/DL (ref 11.5–14.8)
LYMPHOCYTES NFR BLD AUTO: 0.5 K/UL (ref 0.8–4.8)
LYMPHOCYTES NFR BLD AUTO: 5 % (ref 20–44)
MAGNESIUM SERPL-MCNC: 1.9 MG/DL (ref 1.8–2.4)
MCH RBC QN AUTO: 30 PG (ref 26–33)
MCHC RBC AUTO-ENTMCNC: 32 G/DL (ref 31–36)
MCV RBC AUTO: 95 FL (ref 82–100)
MONOCYTES NFR BLD AUTO: 1.3 K/UL (ref 0.1–1.3)
MONOCYTES NFR BLD AUTO: 12.5 % (ref 2–12)
NEUTROPHILS # BLD AUTO: 8.2 K/UL (ref 1.8–8.9)
NEUTROPHILS NFR BLD AUTO: 78.6 % (ref 43–81)
PHOSPHATE SERPL-MCNC: 5.2 MG/DL (ref 2.5–4.9)
PLATELET # BLD AUTO: 440 K/UL (ref 150–450)
POTASSIUM SERPL-SCNC: 5.1 MMOL/L (ref 3.5–5.1)
RBC # BLD AUTO: 2.32 MIL/UL (ref 4–5.2)
SODIUM SERPL-SCNC: 129 MMOL/L (ref 136–145)
WBC NRBC COR # BLD AUTO: 10.4 K/UL (ref 4.3–11)

## 2025-02-21 PROCEDURE — 5A1D70Z PERFORMANCE OF URINARY FILTRATION, INTERMITTENT, LESS THAN 6 HOURS PER DAY: ICD-10-PCS | Performed by: INTERNAL MEDICINE

## 2025-02-21 RX ADMIN — METOLAZONE SCH MG: 2.5 TABLET ORAL at 08:33

## 2025-02-21 RX ADMIN — Medication SCH MG: at 08:43

## 2025-02-21 RX ADMIN — HYDROCODONE BITARTRATE AND ACETAMINOPHEN PRN TAB: 10; 325 TABLET ORAL at 07:58

## 2025-02-21 RX ADMIN — LIDOCAINE SCH APPLIC: 50 OINTMENT TOPICAL at 13:41

## 2025-02-21 RX ADMIN — Medication PRN OZ: at 11:02

## 2025-02-21 RX ADMIN — RENAGEL SCH MG: 800 TABLET ORAL at 08:33

## 2025-02-21 RX ADMIN — Medication SCH APPLIC: at 11:02

## 2025-02-21 RX ADMIN — CLONIDINE HYDROCHLORIDE SCH MG: 0.1 TABLET ORAL at 08:41

## 2025-02-21 RX ADMIN — LABETALOL HCL SCH MG: 100 TABLET, FILM COATED ORAL at 08:43

## 2025-02-21 RX ADMIN — FUROSEMIDE SCH MG: 10 INJECTION, SOLUTION INTRAMUSCULAR; INTRAVENOUS at 08:33

## 2025-02-21 RX ADMIN — SERTRALINE HYDROCHLORIDE SCH MG: 25 TABLET, FILM COATED ORAL at 08:33

## 2025-02-22 VITALS — TEMPERATURE: 98.1 F | OXYGEN SATURATION: 98 % | DIASTOLIC BLOOD PRESSURE: 91 MMHG | SYSTOLIC BLOOD PRESSURE: 162 MMHG

## 2025-02-22 VITALS — DIASTOLIC BLOOD PRESSURE: 89 MMHG | TEMPERATURE: 97.5 F | OXYGEN SATURATION: 99 % | SYSTOLIC BLOOD PRESSURE: 161 MMHG

## 2025-02-22 VITALS — TEMPERATURE: 98.6 F | SYSTOLIC BLOOD PRESSURE: 129 MMHG | OXYGEN SATURATION: 97 % | DIASTOLIC BLOOD PRESSURE: 66 MMHG

## 2025-02-22 RX ADMIN — NYSTATIN AND TRIAMCINOLONE ACETONIDE SCH GM: 100000; 1 CREAM TOPICAL at 09:01

## 2025-02-22 RX ADMIN — ASPIRIN 81 MG SCH MG: 81 TABLET ORAL at 10:20

## 2025-02-22 RX ADMIN — HUMAN INSULIN PRN UNIT: 100 INJECTION, SOLUTION SUBCUTANEOUS at 21:18

## 2025-02-23 VITALS — TEMPERATURE: 97.5 F | DIASTOLIC BLOOD PRESSURE: 70 MMHG | OXYGEN SATURATION: 99 % | SYSTOLIC BLOOD PRESSURE: 126 MMHG

## 2025-02-23 VITALS — SYSTOLIC BLOOD PRESSURE: 118 MMHG | DIASTOLIC BLOOD PRESSURE: 66 MMHG | TEMPERATURE: 97.9 F | OXYGEN SATURATION: 98 %

## 2025-02-23 VITALS — DIASTOLIC BLOOD PRESSURE: 82 MMHG | OXYGEN SATURATION: 100 % | TEMPERATURE: 97.9 F | SYSTOLIC BLOOD PRESSURE: 126 MMHG

## 2025-02-23 VITALS — TEMPERATURE: 99.3 F | DIASTOLIC BLOOD PRESSURE: 90 MMHG | OXYGEN SATURATION: 95 % | SYSTOLIC BLOOD PRESSURE: 128 MMHG

## 2025-02-23 VITALS — OXYGEN SATURATION: 95 % | TEMPERATURE: 98 F | SYSTOLIC BLOOD PRESSURE: 123 MMHG | DIASTOLIC BLOOD PRESSURE: 66 MMHG

## 2025-02-23 VITALS — SYSTOLIC BLOOD PRESSURE: 113 MMHG | OXYGEN SATURATION: 94 % | TEMPERATURE: 97.7 F | DIASTOLIC BLOOD PRESSURE: 80 MMHG

## 2025-02-23 LAB
BASOPHILS # BLD AUTO: 0.1 K/UL (ref 0–0.2)
BASOPHILS NFR BLD AUTO: 0.8 % (ref 0–2)
BUN SERPL-MCNC: 37 MG/DL (ref 7–18)
CALCIUM SERPL-MCNC: 9 MG/DL (ref 8.5–10.1)
CHLORIDE SERPL-SCNC: 91 MMOL/L (ref 98–107)
CO2 SERPL-SCNC: 26 MMOL/L (ref 21–32)
CREAT SERPL-MCNC: 5.1 MG/DL (ref 0.6–1.3)
EOSINOPHIL # BLD AUTO: 0.3 K/UL (ref 0–0.7)
EOSINOPHIL NFR BLD AUTO: 3.1 % (ref 0–6)
ERYTHROCYTE [DISTWIDTH] IN BLOOD BY AUTOMATED COUNT: 19.3 % (ref 11.5–15)
GLUCOSE SERPL-MCNC: 188 MG/DL (ref 74–106)
HCT VFR BLD AUTO: 24 % (ref 33–45)
HGB BLD-MCNC: 7.1 G/DL (ref 11.5–14.8)
LYMPHOCYTES NFR BLD AUTO: 0.6 K/UL (ref 0.8–4.8)
LYMPHOCYTES NFR BLD AUTO: 6.6 % (ref 20–44)
MCH RBC QN AUTO: 31 PG (ref 26–33)
MCHC RBC AUTO-ENTMCNC: 30 G/DL (ref 31–36)
MCV RBC AUTO: 104 FL (ref 82–100)
MONOCYTES NFR BLD AUTO: 1.2 K/UL (ref 0.1–1.3)
MONOCYTES NFR BLD AUTO: 12.2 % (ref 2–12)
NEUTROPHILS # BLD AUTO: 7.4 K/UL (ref 1.8–8.9)
NEUTROPHILS NFR BLD AUTO: 77.3 % (ref 43–81)
PLATELET # BLD AUTO: 468 K/UL (ref 150–450)
POTASSIUM SERPL-SCNC: 5.1 MMOL/L (ref 3.5–5.1)
RBC # BLD AUTO: 2.33 MIL/UL (ref 4–5.2)
SODIUM SERPL-SCNC: 128 MMOL/L (ref 136–145)
WBC NRBC COR # BLD AUTO: 9.6 K/UL (ref 4.3–11)

## 2025-02-23 RX ADMIN — MAGNESIUM HYDROXIDE PRN ML: 400 SUSPENSION ORAL at 17:41

## 2025-02-23 RX ADMIN — SODIUM HYPOCHLORITE SCH ML: 2.5 SOLUTION TOPICAL at 15:30

## 2025-02-24 VITALS — DIASTOLIC BLOOD PRESSURE: 67 MMHG | OXYGEN SATURATION: 97 % | TEMPERATURE: 97.7 F | SYSTOLIC BLOOD PRESSURE: 125 MMHG

## 2025-02-24 VITALS — DIASTOLIC BLOOD PRESSURE: 64 MMHG | TEMPERATURE: 98.6 F | OXYGEN SATURATION: 95 % | SYSTOLIC BLOOD PRESSURE: 134 MMHG

## 2025-02-24 VITALS — DIASTOLIC BLOOD PRESSURE: 56 MMHG | SYSTOLIC BLOOD PRESSURE: 115 MMHG | TEMPERATURE: 97.7 F | OXYGEN SATURATION: 95 %

## 2025-02-24 VITALS — DIASTOLIC BLOOD PRESSURE: 64 MMHG | OXYGEN SATURATION: 95 % | TEMPERATURE: 98.4 F | SYSTOLIC BLOOD PRESSURE: 147 MMHG

## 2025-02-24 VITALS — TEMPERATURE: 98 F | OXYGEN SATURATION: 97 % | DIASTOLIC BLOOD PRESSURE: 63 MMHG | SYSTOLIC BLOOD PRESSURE: 137 MMHG

## 2025-02-24 VITALS — DIASTOLIC BLOOD PRESSURE: 63 MMHG | TEMPERATURE: 97.7 F | OXYGEN SATURATION: 100 % | SYSTOLIC BLOOD PRESSURE: 113 MMHG

## 2025-02-24 VITALS — OXYGEN SATURATION: 97 % | SYSTOLIC BLOOD PRESSURE: 125 MMHG | TEMPERATURE: 97.6 F | DIASTOLIC BLOOD PRESSURE: 67 MMHG

## 2025-02-24 VITALS — SYSTOLIC BLOOD PRESSURE: 147 MMHG | OXYGEN SATURATION: 95 % | DIASTOLIC BLOOD PRESSURE: 64 MMHG | TEMPERATURE: 98.4 F

## 2025-02-24 VITALS — SYSTOLIC BLOOD PRESSURE: 137 MMHG | DIASTOLIC BLOOD PRESSURE: 63 MMHG | TEMPERATURE: 98 F | OXYGEN SATURATION: 97 %

## 2025-02-24 PROCEDURE — 0JBP0ZZ EXCISION OF LEFT LOWER LEG SUBCUTANEOUS TISSUE AND FASCIA, OPEN APPROACH: ICD-10-PCS | Performed by: STUDENT IN AN ORGANIZED HEALTH CARE EDUCATION/TRAINING PROGRAM

## 2025-02-24 PROCEDURE — 0JBN0ZZ EXCISION OF RIGHT LOWER LEG SUBCUTANEOUS TISSUE AND FASCIA, OPEN APPROACH: ICD-10-PCS | Performed by: STUDENT IN AN ORGANIZED HEALTH CARE EDUCATION/TRAINING PROGRAM

## 2025-02-24 RX ADMIN — BUPIVACAINE HYDROCHLORIDE ONE MG: 2.5 INJECTION, SOLUTION EPIDURAL; INFILTRATION; INTRACAUDAL; PERINEURAL at 09:00

## 2025-02-25 VITALS — DIASTOLIC BLOOD PRESSURE: 62 MMHG | SYSTOLIC BLOOD PRESSURE: 135 MMHG | TEMPERATURE: 98.1 F | OXYGEN SATURATION: 99 %

## 2025-02-25 VITALS — TEMPERATURE: 98.1 F | SYSTOLIC BLOOD PRESSURE: 117 MMHG | OXYGEN SATURATION: 100 % | DIASTOLIC BLOOD PRESSURE: 61 MMHG

## 2025-02-25 VITALS — DIASTOLIC BLOOD PRESSURE: 45 MMHG | OXYGEN SATURATION: 98 % | SYSTOLIC BLOOD PRESSURE: 128 MMHG | TEMPERATURE: 97.7 F

## 2025-02-25 VITALS — TEMPERATURE: 98.6 F | OXYGEN SATURATION: 94 % | SYSTOLIC BLOOD PRESSURE: 124 MMHG | DIASTOLIC BLOOD PRESSURE: 73 MMHG

## 2025-02-25 VITALS — SYSTOLIC BLOOD PRESSURE: 122 MMHG | OXYGEN SATURATION: 99 % | DIASTOLIC BLOOD PRESSURE: 97 MMHG | TEMPERATURE: 98.2 F

## 2025-02-26 VITALS — TEMPERATURE: 97.9 F | OXYGEN SATURATION: 99 % | DIASTOLIC BLOOD PRESSURE: 71 MMHG | SYSTOLIC BLOOD PRESSURE: 170 MMHG

## 2025-02-26 VITALS — TEMPERATURE: 97.9 F | DIASTOLIC BLOOD PRESSURE: 90 MMHG | OXYGEN SATURATION: 99 % | SYSTOLIC BLOOD PRESSURE: 163 MMHG

## 2025-02-26 VITALS — TEMPERATURE: 98.4 F | OXYGEN SATURATION: 97 % | SYSTOLIC BLOOD PRESSURE: 165 MMHG | DIASTOLIC BLOOD PRESSURE: 65 MMHG

## 2025-02-26 VITALS — TEMPERATURE: 98.6 F | SYSTOLIC BLOOD PRESSURE: 165 MMHG | OXYGEN SATURATION: 99 % | DIASTOLIC BLOOD PRESSURE: 70 MMHG

## 2025-02-26 VITALS — SYSTOLIC BLOOD PRESSURE: 126 MMHG | DIASTOLIC BLOOD PRESSURE: 57 MMHG

## 2025-02-26 VITALS — SYSTOLIC BLOOD PRESSURE: 137 MMHG | OXYGEN SATURATION: 98 % | TEMPERATURE: 97.8 F | DIASTOLIC BLOOD PRESSURE: 85 MMHG

## 2025-02-26 VITALS — OXYGEN SATURATION: 98 %

## 2025-02-26 RX ADMIN — MEROPENEM ONE MLS/HR: 1 INJECTION INTRAVENOUS at 07:54

## 2025-03-06 ENCOUNTER — HOSPITAL ENCOUNTER (INPATIENT)
Dept: HOSPITAL 54 - ER | Age: 50
LOS: 7 days | Discharge: HOME | DRG: 264 | End: 2025-03-13
Attending: SURGERY | Admitting: INTERNAL MEDICINE
Payer: MEDICARE

## 2025-03-06 VITALS — SYSTOLIC BLOOD PRESSURE: 151 MMHG | TEMPERATURE: 98.2 F | OXYGEN SATURATION: 99 % | DIASTOLIC BLOOD PRESSURE: 75 MMHG

## 2025-03-06 VITALS — OXYGEN SATURATION: 100 % | TEMPERATURE: 97.7 F | SYSTOLIC BLOOD PRESSURE: 148 MMHG | DIASTOLIC BLOOD PRESSURE: 85 MMHG

## 2025-03-06 VITALS — DIASTOLIC BLOOD PRESSURE: 85 MMHG | TEMPERATURE: 97.9 F | SYSTOLIC BLOOD PRESSURE: 180 MMHG | OXYGEN SATURATION: 100 %

## 2025-03-06 VITALS — WEIGHT: 291 LBS | BODY MASS INDEX: 49.68 KG/M2 | HEIGHT: 64 IN

## 2025-03-06 VITALS — SYSTOLIC BLOOD PRESSURE: 160 MMHG | TEMPERATURE: 98.2 F | DIASTOLIC BLOOD PRESSURE: 88 MMHG | OXYGEN SATURATION: 100 %

## 2025-03-06 VITALS — OXYGEN SATURATION: 100 %

## 2025-03-06 DIAGNOSIS — D63.8: ICD-10-CM

## 2025-03-06 DIAGNOSIS — E66.01: ICD-10-CM

## 2025-03-06 DIAGNOSIS — Z99.2: ICD-10-CM

## 2025-03-06 DIAGNOSIS — Z87.891: ICD-10-CM

## 2025-03-06 DIAGNOSIS — Z82.49: ICD-10-CM

## 2025-03-06 DIAGNOSIS — Z79.84: ICD-10-CM

## 2025-03-06 DIAGNOSIS — E87.1: ICD-10-CM

## 2025-03-06 DIAGNOSIS — Z86.718: ICD-10-CM

## 2025-03-06 DIAGNOSIS — R53.1: ICD-10-CM

## 2025-03-06 DIAGNOSIS — L97.828: ICD-10-CM

## 2025-03-06 DIAGNOSIS — N18.6: ICD-10-CM

## 2025-03-06 DIAGNOSIS — Z88.0: ICD-10-CM

## 2025-03-06 DIAGNOSIS — Z79.899: ICD-10-CM

## 2025-03-06 DIAGNOSIS — J96.21: ICD-10-CM

## 2025-03-06 DIAGNOSIS — I25.10: ICD-10-CM

## 2025-03-06 DIAGNOSIS — Z86.73: ICD-10-CM

## 2025-03-06 DIAGNOSIS — I87.313: ICD-10-CM

## 2025-03-06 DIAGNOSIS — Z79.4: ICD-10-CM

## 2025-03-06 DIAGNOSIS — L97.829: ICD-10-CM

## 2025-03-06 DIAGNOSIS — E11.649: ICD-10-CM

## 2025-03-06 DIAGNOSIS — L03.115: ICD-10-CM

## 2025-03-06 DIAGNOSIS — I50.33: ICD-10-CM

## 2025-03-06 DIAGNOSIS — Z95.1: ICD-10-CM

## 2025-03-06 DIAGNOSIS — L89.892: ICD-10-CM

## 2025-03-06 DIAGNOSIS — E83.9: ICD-10-CM

## 2025-03-06 DIAGNOSIS — E87.5: ICD-10-CM

## 2025-03-06 DIAGNOSIS — L97.818: ICD-10-CM

## 2025-03-06 DIAGNOSIS — I13.2: Primary | ICD-10-CM

## 2025-03-06 DIAGNOSIS — E78.5: ICD-10-CM

## 2025-03-06 DIAGNOSIS — Z20.822: ICD-10-CM

## 2025-03-06 DIAGNOSIS — E88.09: ICD-10-CM

## 2025-03-06 DIAGNOSIS — E11.22: ICD-10-CM

## 2025-03-06 DIAGNOSIS — E44.0: ICD-10-CM

## 2025-03-06 DIAGNOSIS — L03.116: ICD-10-CM

## 2025-03-06 DIAGNOSIS — I87.2: ICD-10-CM

## 2025-03-06 LAB
ALBUMIN SERPL BCP-MCNC: 2.5 G/DL (ref 3.4–5)
ALP SERPL-CCNC: 285 U/L (ref 46–116)
ALT SERPL W P-5'-P-CCNC: 10 U/L (ref 12–78)
AST SERPL W P-5'-P-CCNC: 16 U/L (ref 15–37)
BASOPHILS # BLD AUTO: 0.1 K/UL (ref 0–0.2)
BASOPHILS NFR BLD AUTO: 0.3 % (ref 0–2)
BILIRUB DIRECT SERPL-MCNC: 0.2 MG/DL (ref 0–0.2)
BILIRUB SERPL-MCNC: 0.4 MG/DL (ref 0.2–1)
BUN SERPL-MCNC: 34 MG/DL (ref 7–18)
CALCIUM SERPL-MCNC: 8.8 MG/DL (ref 8.5–10.1)
CHLORIDE SERPL-SCNC: 96 MMOL/L (ref 98–107)
CO2 SERPL-SCNC: 28 MMOL/L (ref 21–32)
CREAT SERPL-MCNC: 4.9 MG/DL (ref 0.6–1.3)
EOSINOPHIL # BLD AUTO: 1 K/UL (ref 0–0.7)
EOSINOPHIL NFR BLD AUTO: 5.9 % (ref 0–6)
ERYTHROCYTE [DISTWIDTH] IN BLOOD BY AUTOMATED COUNT: 18.8 % (ref 11.5–15)
GLUCOSE SERPL-MCNC: 171 MG/DL (ref 74–106)
HCT VFR BLD AUTO: 29 % (ref 33–45)
HGB BLD-MCNC: 8.9 G/DL (ref 11.5–14.8)
LYMPHOCYTES NFR BLD AUTO: 0.7 K/UL (ref 0.8–4.8)
LYMPHOCYTES NFR BLD AUTO: 4.4 % (ref 20–44)
MAGNESIUM SERPL-MCNC: 2.1 MG/DL (ref 1.8–2.4)
MCH RBC QN AUTO: 29 PG (ref 26–33)
MCHC RBC AUTO-ENTMCNC: 31 G/DL (ref 31–36)
MCV RBC AUTO: 96 FL (ref 82–100)
MONOCYTES NFR BLD AUTO: 1.4 K/UL (ref 0.1–1.3)
MONOCYTES NFR BLD AUTO: 8.5 % (ref 2–12)
NEUTROPHILS # BLD AUTO: 13 K/UL (ref 1.8–8.9)
NEUTROPHILS NFR BLD AUTO: 80.9 % (ref 43–81)
PHOSPHATE SERPL-MCNC: 3.9 MG/DL (ref 2.5–4.9)
PLATELET # BLD AUTO: 651 K/UL (ref 150–450)
POTASSIUM SERPL-SCNC: 3.5 MMOL/L (ref 3.5–5.1)
PROT SERPL-MCNC: 7 G/DL (ref 6.4–8.2)
RBC # BLD AUTO: 3.02 MIL/UL (ref 4–5.2)
SODIUM SERPL-SCNC: 133 MMOL/L (ref 136–145)
WBC NRBC COR # BLD AUTO: 16.1 K/UL (ref 4.3–11)

## 2025-03-06 PROCEDURE — 5A1D70Z PERFORMANCE OF URINARY FILTRATION, INTERMITTENT, LESS THAN 6 HOURS PER DAY: ICD-10-PCS | Performed by: INTERNAL MEDICINE

## 2025-03-06 PROCEDURE — A6253 ABSORPT DRG > 48 SQ IN W/O B: HCPCS

## 2025-03-06 PROCEDURE — G0378 HOSPITAL OBSERVATION PER HR: HCPCS

## 2025-03-06 PROCEDURE — A4223 INFUSION SUPPLIES W/O PUMP: HCPCS

## 2025-03-06 PROCEDURE — A6403 STERILE GAUZE>16 <= 48 SQ IN: HCPCS

## 2025-03-06 RX ADMIN — GLIMEPIRIDE SCH MG: 1 TABLET ORAL at 17:11

## 2025-03-06 RX ADMIN — HYDRALAZINE HYDROCHLORIDE ONE MG: 20 INJECTION INTRAMUSCULAR; INTRAVENOUS at 03:14

## 2025-03-06 RX ADMIN — Medication ONE MG: at 02:51

## 2025-03-06 RX ADMIN — Medication PRN MG: at 18:16

## 2025-03-06 RX ADMIN — RENAGEL SCH MG: 800 TABLET ORAL at 12:51

## 2025-03-06 RX ADMIN — GABAPENTIN SCH MG: 300 CAPSULE ORAL at 21:56

## 2025-03-06 RX ADMIN — Medication SCH MG: at 19:46

## 2025-03-06 RX ADMIN — LABETALOL HCL SCH MG: 100 TABLET, FILM COATED ORAL at 17:11

## 2025-03-06 RX ADMIN — FUROSEMIDE ONE MG: 10 INJECTION, SOLUTION INTRAMUSCULAR; INTRAVENOUS at 01:12

## 2025-03-06 RX ADMIN — ALBUTEROL SULFATE SCH MG: 1.25 SOLUTION RESPIRATORY (INHALATION) at 19:46

## 2025-03-06 RX ADMIN — NIFEDIPINE SCH MG: 60 TABLET, EXTENDED RELEASE ORAL at 17:10

## 2025-03-06 RX ADMIN — INSULIN GLARGINE SCH UNIT: 100 INJECTION, SOLUTION SUBCUTANEOUS at 21:55

## 2025-03-06 RX ADMIN — Medication PRN MG: at 11:01

## 2025-03-06 RX ADMIN — FERROUS SULFATE TAB 325 MG (65 MG ELEMENTAL FE) SCH MG: 325 (65 FE) TAB at 17:11

## 2025-03-06 RX ADMIN — HEPARIN SODIUM SCH UNITS: 5000 INJECTION INTRAVENOUS; SUBCUTANEOUS at 09:40

## 2025-03-06 RX ADMIN — Medication SCH EACH: at 07:30

## 2025-03-06 RX ADMIN — Medication SCH MG: at 17:11

## 2025-03-06 RX ADMIN — CLONIDINE HYDROCHLORIDE SCH MG: 0.1 TABLET ORAL at 11:01

## 2025-03-06 RX ADMIN — MONTELUKAST SODIUM SCH MG: 10 TABLET, FILM COATED ORAL at 21:57

## 2025-03-06 RX ADMIN — Medication ONE MG: at 01:12

## 2025-03-06 RX ADMIN — ASPIRIN ONE MG: 325 TABLET, FILM COATED ORAL at 01:12

## 2025-03-06 RX ADMIN — FUROSEMIDE SCH MG: 10 INJECTION, SOLUTION INTRAMUSCULAR; INTRAVENOUS at 09:39

## 2025-03-06 RX ADMIN — ACETAMINOPHEN PRN MG: 325 TABLET ORAL at 12:32

## 2025-03-07 VITALS — TEMPERATURE: 98 F | SYSTOLIC BLOOD PRESSURE: 133 MMHG | DIASTOLIC BLOOD PRESSURE: 62 MMHG | OXYGEN SATURATION: 100 %

## 2025-03-07 VITALS — OXYGEN SATURATION: 100 %

## 2025-03-07 VITALS — DIASTOLIC BLOOD PRESSURE: 83 MMHG | TEMPERATURE: 98.1 F | OXYGEN SATURATION: 99 % | SYSTOLIC BLOOD PRESSURE: 134 MMHG

## 2025-03-07 VITALS — SYSTOLIC BLOOD PRESSURE: 131 MMHG | DIASTOLIC BLOOD PRESSURE: 68 MMHG | OXYGEN SATURATION: 100 % | TEMPERATURE: 97.7 F

## 2025-03-07 VITALS — TEMPERATURE: 97.9 F | DIASTOLIC BLOOD PRESSURE: 74 MMHG | OXYGEN SATURATION: 95 % | SYSTOLIC BLOOD PRESSURE: 121 MMHG

## 2025-03-07 VITALS — OXYGEN SATURATION: 100 % | TEMPERATURE: 98.2 F | DIASTOLIC BLOOD PRESSURE: 73 MMHG | SYSTOLIC BLOOD PRESSURE: 130 MMHG

## 2025-03-07 VITALS — SYSTOLIC BLOOD PRESSURE: 154 MMHG | TEMPERATURE: 97.7 F | DIASTOLIC BLOOD PRESSURE: 55 MMHG | OXYGEN SATURATION: 100 %

## 2025-03-07 VITALS — OXYGEN SATURATION: 99 %

## 2025-03-07 LAB
BASOPHILS # BLD AUTO: 0.2 K/UL (ref 0–0.2)
BASOPHILS NFR BLD AUTO: 1.3 % (ref 0–2)
BUN SERPL-MCNC: 13 MG/DL (ref 7–18)
CALCIUM SERPL-MCNC: 8.7 MG/DL (ref 8.5–10.1)
CHLORIDE SERPL-SCNC: 98 MMOL/L (ref 98–107)
CO2 SERPL-SCNC: 29 MMOL/L (ref 21–32)
CREAT SERPL-MCNC: 2.2 MG/DL (ref 0.6–1.3)
EOSINOPHIL # BLD AUTO: 0.8 K/UL (ref 0–0.7)
EOSINOPHIL NFR BLD AUTO: 5.4 % (ref 0–6)
ERYTHROCYTE [DISTWIDTH] IN BLOOD BY AUTOMATED COUNT: 19.1 % (ref 11.5–15)
GLUCOSE SERPL-MCNC: 91 MG/DL (ref 74–106)
HCT VFR BLD AUTO: 28 % (ref 33–45)
HGB BLD-MCNC: 8.7 G/DL (ref 11.5–14.8)
INR PPP: 1.07 (ref 0.91–1.1)
LYMPHOCYTES NFR BLD AUTO: 0.9 K/UL (ref 0.8–4.8)
LYMPHOCYTES NFR BLD AUTO: 6 % (ref 20–44)
MAGNESIUM SERPL-MCNC: 2 MG/DL (ref 1.8–2.4)
MCH RBC QN AUTO: 30 PG (ref 26–33)
MCHC RBC AUTO-ENTMCNC: 31 G/DL (ref 31–36)
MCV RBC AUTO: 97 FL (ref 82–100)
MONOCYTES NFR BLD AUTO: 1.4 K/UL (ref 0.1–1.3)
MONOCYTES NFR BLD AUTO: 9.5 % (ref 2–12)
NEUTROPHILS # BLD AUTO: 11.3 K/UL (ref 1.8–8.9)
NEUTROPHILS NFR BLD AUTO: 77.8 % (ref 43–81)
PHOSPHATE SERPL-MCNC: 1.8 MG/DL (ref 2.5–4.9)
PLATELET # BLD AUTO: 578 K/UL (ref 150–450)
POTASSIUM SERPL-SCNC: 4.2 MMOL/L (ref 3.5–5.1)
PROTHROMBIN TIME: 11.3 SECS (ref 9.2–11.1)
RBC # BLD AUTO: 2.89 MIL/UL (ref 4–5.2)
SODIUM SERPL-SCNC: 132 MMOL/L (ref 136–145)
WBC NRBC COR # BLD AUTO: 14.5 K/UL (ref 4.3–11)

## 2025-03-07 RX ADMIN — SERTRALINE HYDROCHLORIDE SCH MG: 25 TABLET, FILM COATED ORAL at 08:18

## 2025-03-07 RX ADMIN — HYDROCODONE BITARTRATE AND ACETAMINOPHEN PRN TAB: 10; 325 TABLET ORAL at 00:34

## 2025-03-07 RX ADMIN — DEXTROSE MONOHYDRATE PRN ML: 500 INJECTION PARENTERAL at 17:00

## 2025-03-07 RX ADMIN — LORATADINE SCH MG: 10 TABLET ORAL at 22:39

## 2025-03-07 RX ADMIN — SUMATRIPTAN SUCCINATE SCH MG: 25 TABLET, FILM COATED ORAL at 08:19

## 2025-03-07 RX ADMIN — INSULIN HUMAN PRN UNITS: 100 INJECTION, SOLUTION PARENTERAL at 21:39

## 2025-03-08 VITALS — OXYGEN SATURATION: 99 % | DIASTOLIC BLOOD PRESSURE: 91 MMHG | TEMPERATURE: 97.7 F | SYSTOLIC BLOOD PRESSURE: 166 MMHG

## 2025-03-08 VITALS — SYSTOLIC BLOOD PRESSURE: 138 MMHG | OXYGEN SATURATION: 100 % | DIASTOLIC BLOOD PRESSURE: 56 MMHG | TEMPERATURE: 98.1 F

## 2025-03-08 VITALS — SYSTOLIC BLOOD PRESSURE: 136 MMHG | DIASTOLIC BLOOD PRESSURE: 80 MMHG | OXYGEN SATURATION: 100 % | TEMPERATURE: 97.9 F

## 2025-03-08 VITALS — OXYGEN SATURATION: 100 %

## 2025-03-08 VITALS — SYSTOLIC BLOOD PRESSURE: 143 MMHG | OXYGEN SATURATION: 100 % | TEMPERATURE: 98.4 F | DIASTOLIC BLOOD PRESSURE: 67 MMHG

## 2025-03-08 VITALS — OXYGEN SATURATION: 100 % | DIASTOLIC BLOOD PRESSURE: 80 MMHG | TEMPERATURE: 97.5 F | SYSTOLIC BLOOD PRESSURE: 141 MMHG

## 2025-03-08 VITALS — SYSTOLIC BLOOD PRESSURE: 126 MMHG | OXYGEN SATURATION: 99 % | TEMPERATURE: 98.1 F | DIASTOLIC BLOOD PRESSURE: 69 MMHG

## 2025-03-08 LAB
BASOPHILS # BLD AUTO: 0.2 K/UL (ref 0–0.2)
BASOPHILS NFR BLD AUTO: 1.3 % (ref 0–2)
BUN SERPL-MCNC: 28 MG/DL (ref 7–18)
CALCIUM SERPL-MCNC: 9.1 MG/DL (ref 8.5–10.1)
CHLORIDE SERPL-SCNC: 98 MMOL/L (ref 98–107)
CO2 SERPL-SCNC: 26 MMOL/L (ref 21–32)
CREAT SERPL-MCNC: 3.9 MG/DL (ref 0.6–1.3)
EOSINOPHIL # BLD AUTO: 0.9 K/UL (ref 0–0.7)
EOSINOPHIL NFR BLD AUTO: 6.8 % (ref 0–6)
ERYTHROCYTE [DISTWIDTH] IN BLOOD BY AUTOMATED COUNT: 19.2 % (ref 11.5–15)
GLUCOSE SERPL-MCNC: 136 MG/DL (ref 74–106)
HCT VFR BLD AUTO: 28 % (ref 33–45)
HGB BLD-MCNC: 8.5 G/DL (ref 11.5–14.8)
LYMPHOCYTES NFR BLD AUTO: 1 K/UL (ref 0.8–4.8)
LYMPHOCYTES NFR BLD AUTO: 6.8 % (ref 20–44)
MAGNESIUM SERPL-MCNC: 2.2 MG/DL (ref 1.8–2.4)
MCH RBC QN AUTO: 30 PG (ref 26–33)
MCHC RBC AUTO-ENTMCNC: 31 G/DL (ref 31–36)
MCV RBC AUTO: 97 FL (ref 82–100)
MONOCYTES NFR BLD AUTO: 1.7 K/UL (ref 0.1–1.3)
MONOCYTES NFR BLD AUTO: 12.2 % (ref 2–12)
NEUTROPHILS # BLD AUTO: 10.1 K/UL (ref 1.8–8.9)
NEUTROPHILS NFR BLD AUTO: 72.9 % (ref 43–81)
PHOSPHATE SERPL-MCNC: 3.6 MG/DL (ref 2.5–4.9)
PLATELET # BLD AUTO: 522 K/UL (ref 150–450)
POTASSIUM SERPL-SCNC: 5.2 MMOL/L (ref 3.5–5.1)
RBC # BLD AUTO: 2.87 MIL/UL (ref 4–5.2)
SODIUM SERPL-SCNC: 131 MMOL/L (ref 136–145)
WBC NRBC COR # BLD AUTO: 13.9 K/UL (ref 4.3–11)

## 2025-03-08 RX ADMIN — LIDOCAINE SCH GM: 50 OINTMENT TOPICAL at 09:02

## 2025-03-09 VITALS — OXYGEN SATURATION: 100 %

## 2025-03-09 VITALS — SYSTOLIC BLOOD PRESSURE: 140 MMHG | DIASTOLIC BLOOD PRESSURE: 74 MMHG | OXYGEN SATURATION: 100 % | TEMPERATURE: 98.1 F

## 2025-03-09 VITALS — SYSTOLIC BLOOD PRESSURE: 160 MMHG | OXYGEN SATURATION: 100 % | DIASTOLIC BLOOD PRESSURE: 80 MMHG | TEMPERATURE: 96.8 F

## 2025-03-09 VITALS — SYSTOLIC BLOOD PRESSURE: 147 MMHG | OXYGEN SATURATION: 99 % | DIASTOLIC BLOOD PRESSURE: 64 MMHG | TEMPERATURE: 98.4 F

## 2025-03-09 VITALS — DIASTOLIC BLOOD PRESSURE: 77 MMHG | TEMPERATURE: 97.9 F | SYSTOLIC BLOOD PRESSURE: 141 MMHG | OXYGEN SATURATION: 100 %

## 2025-03-09 VITALS — SYSTOLIC BLOOD PRESSURE: 141 MMHG | TEMPERATURE: 97.5 F | DIASTOLIC BLOOD PRESSURE: 77 MMHG | OXYGEN SATURATION: 100 %

## 2025-03-09 VITALS — SYSTOLIC BLOOD PRESSURE: 140 MMHG | OXYGEN SATURATION: 100 % | TEMPERATURE: 97.7 F | DIASTOLIC BLOOD PRESSURE: 75 MMHG

## 2025-03-09 VITALS — OXYGEN SATURATION: 98 %

## 2025-03-09 LAB
BASOPHILS # BLD AUTO: 0.1 K/UL (ref 0–0.2)
BASOPHILS NFR BLD AUTO: 1.4 % (ref 0–2)
BUN SERPL-MCNC: 33 MG/DL (ref 7–18)
CALCIUM SERPL-MCNC: 8.8 MG/DL (ref 8.5–10.1)
CHLORIDE SERPL-SCNC: 96 MMOL/L (ref 98–107)
CO2 SERPL-SCNC: 29 MMOL/L (ref 21–32)
CREAT SERPL-MCNC: 4.1 MG/DL (ref 0.6–1.3)
EOSINOPHIL # BLD AUTO: 0.8 K/UL (ref 0–0.7)
EOSINOPHIL NFR BLD AUTO: 7.9 % (ref 0–6)
ERYTHROCYTE [DISTWIDTH] IN BLOOD BY AUTOMATED COUNT: 19.6 % (ref 11.5–15)
GLUCOSE SERPL-MCNC: 121 MG/DL (ref 74–106)
HCT VFR BLD AUTO: 26 % (ref 33–45)
HGB BLD-MCNC: 8.4 G/DL (ref 11.5–14.8)
LYMPHOCYTES NFR BLD AUTO: 0.8 K/UL (ref 0.8–4.8)
LYMPHOCYTES NFR BLD AUTO: 7.9 % (ref 20–44)
MCH RBC QN AUTO: 31 PG (ref 26–33)
MCHC RBC AUTO-ENTMCNC: 32 G/DL (ref 31–36)
MCV RBC AUTO: 96 FL (ref 82–100)
MONOCYTES NFR BLD AUTO: 1.2 K/UL (ref 0.1–1.3)
MONOCYTES NFR BLD AUTO: 12 % (ref 2–12)
NEUTROPHILS # BLD AUTO: 7.3 K/UL (ref 1.8–8.9)
NEUTROPHILS NFR BLD AUTO: 70.8 % (ref 43–81)
PLATELET # BLD AUTO: 520 K/UL (ref 150–450)
POTASSIUM SERPL-SCNC: 5.6 MMOL/L (ref 3.5–5.1)
RBC # BLD AUTO: 2.73 MIL/UL (ref 4–5.2)
SODIUM SERPL-SCNC: 132 MMOL/L (ref 136–145)
WBC NRBC COR # BLD AUTO: 10.3 K/UL (ref 4.3–11)

## 2025-03-10 VITALS — OXYGEN SATURATION: 99 %

## 2025-03-10 VITALS — TEMPERATURE: 97.5 F | DIASTOLIC BLOOD PRESSURE: 62 MMHG | OXYGEN SATURATION: 100 % | SYSTOLIC BLOOD PRESSURE: 140 MMHG

## 2025-03-10 VITALS — TEMPERATURE: 97.5 F | OXYGEN SATURATION: 100 % | DIASTOLIC BLOOD PRESSURE: 80 MMHG | SYSTOLIC BLOOD PRESSURE: 148 MMHG

## 2025-03-10 VITALS — OXYGEN SATURATION: 100 % | SYSTOLIC BLOOD PRESSURE: 158 MMHG | TEMPERATURE: 98.6 F | DIASTOLIC BLOOD PRESSURE: 85 MMHG

## 2025-03-10 VITALS — DIASTOLIC BLOOD PRESSURE: 72 MMHG | OXYGEN SATURATION: 100 % | SYSTOLIC BLOOD PRESSURE: 148 MMHG | TEMPERATURE: 98.9 F

## 2025-03-10 VITALS — DIASTOLIC BLOOD PRESSURE: 85 MMHG | SYSTOLIC BLOOD PRESSURE: 141 MMHG | TEMPERATURE: 97.5 F | OXYGEN SATURATION: 100 %

## 2025-03-10 VITALS — TEMPERATURE: 98.8 F | OXYGEN SATURATION: 100 % | SYSTOLIC BLOOD PRESSURE: 136 MMHG | DIASTOLIC BLOOD PRESSURE: 59 MMHG

## 2025-03-10 VITALS — OXYGEN SATURATION: 98 %

## 2025-03-10 PROCEDURE — 0JBN0ZZ EXCISION OF RIGHT LOWER LEG SUBCUTANEOUS TISSUE AND FASCIA, OPEN APPROACH: ICD-10-PCS | Performed by: STUDENT IN AN ORGANIZED HEALTH CARE EDUCATION/TRAINING PROGRAM

## 2025-03-10 PROCEDURE — 0JBP0ZZ EXCISION OF LEFT LOWER LEG SUBCUTANEOUS TISSUE AND FASCIA, OPEN APPROACH: ICD-10-PCS | Performed by: STUDENT IN AN ORGANIZED HEALTH CARE EDUCATION/TRAINING PROGRAM

## 2025-03-10 RX ADMIN — MEROPENEM SCH MLS/HR: 500 INJECTION INTRAVENOUS at 20:24

## 2025-03-10 RX ADMIN — SODIUM HYPOCHLORITE SCH ML: 1.25 SOLUTION TOPICAL at 12:21

## 2025-03-11 VITALS — OXYGEN SATURATION: 97 %

## 2025-03-11 VITALS — OXYGEN SATURATION: 100 % | SYSTOLIC BLOOD PRESSURE: 148 MMHG | TEMPERATURE: 97.5 F | DIASTOLIC BLOOD PRESSURE: 80 MMHG

## 2025-03-11 VITALS — OXYGEN SATURATION: 99 %

## 2025-03-11 VITALS — OXYGEN SATURATION: 100 % | DIASTOLIC BLOOD PRESSURE: 59 MMHG | TEMPERATURE: 98.2 F | SYSTOLIC BLOOD PRESSURE: 151 MMHG

## 2025-03-11 VITALS — DIASTOLIC BLOOD PRESSURE: 67 MMHG | SYSTOLIC BLOOD PRESSURE: 141 MMHG | TEMPERATURE: 98.2 F | OXYGEN SATURATION: 100 %

## 2025-03-11 VITALS — TEMPERATURE: 99.1 F | DIASTOLIC BLOOD PRESSURE: 93 MMHG | SYSTOLIC BLOOD PRESSURE: 113 MMHG | OXYGEN SATURATION: 100 %

## 2025-03-11 VITALS — TEMPERATURE: 98.1 F | SYSTOLIC BLOOD PRESSURE: 138 MMHG | OXYGEN SATURATION: 100 % | DIASTOLIC BLOOD PRESSURE: 69 MMHG

## 2025-03-11 VITALS — TEMPERATURE: 97.5 F | DIASTOLIC BLOOD PRESSURE: 65 MMHG | SYSTOLIC BLOOD PRESSURE: 128 MMHG | OXYGEN SATURATION: 96 %

## 2025-03-11 VITALS — OXYGEN SATURATION: 98 %

## 2025-03-11 VITALS — OXYGEN SATURATION: 96 %

## 2025-03-11 RX ADMIN — INSULIN GLARGINE SCH UNIT: 100 INJECTION, SOLUTION SUBCUTANEOUS at 09:00

## 2025-03-11 RX ADMIN — LORATADINE ONE MG: 10 TABLET ORAL at 01:30

## 2025-03-12 VITALS — SYSTOLIC BLOOD PRESSURE: 152 MMHG | TEMPERATURE: 98.4 F | OXYGEN SATURATION: 99 % | DIASTOLIC BLOOD PRESSURE: 66 MMHG

## 2025-03-12 VITALS — DIASTOLIC BLOOD PRESSURE: 74 MMHG | TEMPERATURE: 98.6 F | SYSTOLIC BLOOD PRESSURE: 134 MMHG | OXYGEN SATURATION: 99 %

## 2025-03-12 VITALS — OXYGEN SATURATION: 97 % | SYSTOLIC BLOOD PRESSURE: 121 MMHG | TEMPERATURE: 97.5 F | DIASTOLIC BLOOD PRESSURE: 79 MMHG

## 2025-03-12 VITALS — OXYGEN SATURATION: 98 %

## 2025-03-12 VITALS — SYSTOLIC BLOOD PRESSURE: 131 MMHG | OXYGEN SATURATION: 99 % | TEMPERATURE: 98.8 F | DIASTOLIC BLOOD PRESSURE: 69 MMHG

## 2025-03-12 VITALS — OXYGEN SATURATION: 99 % | SYSTOLIC BLOOD PRESSURE: 130 MMHG | TEMPERATURE: 97.7 F | DIASTOLIC BLOOD PRESSURE: 72 MMHG

## 2025-03-12 VITALS — SYSTOLIC BLOOD PRESSURE: 116 MMHG | OXYGEN SATURATION: 100 % | TEMPERATURE: 97.3 F | DIASTOLIC BLOOD PRESSURE: 78 MMHG

## 2025-03-12 VITALS — OXYGEN SATURATION: 99 %

## 2025-03-12 LAB
BASOPHILS # BLD AUTO: 0.1 K/UL (ref 0–0.2)
BASOPHILS NFR BLD AUTO: 0.7 % (ref 0–2)
BUN SERPL-MCNC: 28 MG/DL (ref 7–18)
BUN SERPL-MCNC: 44 MG/DL (ref 7–18)
CALCIUM SERPL-MCNC: 9.2 MG/DL (ref 8.5–10.1)
CALCIUM SERPL-MCNC: 9.2 MG/DL (ref 8.5–10.1)
CHLORIDE SERPL-SCNC: 96 MMOL/L (ref 98–107)
CHLORIDE SERPL-SCNC: 97 MMOL/L (ref 98–107)
CO2 SERPL-SCNC: 27 MMOL/L (ref 21–32)
CO2 SERPL-SCNC: 34 MMOL/L (ref 21–32)
CREAT SERPL-MCNC: 3.2 MG/DL (ref 0.6–1.3)
CREAT SERPL-MCNC: 4.3 MG/DL (ref 0.6–1.3)
EOSINOPHIL # BLD AUTO: 1 K/UL (ref 0–0.7)
EOSINOPHIL NFR BLD AUTO: 8.4 % (ref 0–6)
ERYTHROCYTE [DISTWIDTH] IN BLOOD BY AUTOMATED COUNT: 19.5 % (ref 11.5–15)
GLUCOSE SERPL-MCNC: 114 MG/DL (ref 74–106)
GLUCOSE SERPL-MCNC: 171 MG/DL (ref 74–106)
HCT VFR BLD AUTO: 26 % (ref 33–45)
HGB BLD-MCNC: 7.9 G/DL (ref 11.5–14.8)
LYMPHOCYTES NFR BLD AUTO: 0.8 K/UL (ref 0.8–4.8)
LYMPHOCYTES NFR BLD AUTO: 7.2 % (ref 20–44)
MCH RBC QN AUTO: 29 PG (ref 26–33)
MCHC RBC AUTO-ENTMCNC: 31 G/DL (ref 31–36)
MCV RBC AUTO: 97 FL (ref 82–100)
MONOCYTES NFR BLD AUTO: 1.6 K/UL (ref 0.1–1.3)
MONOCYTES NFR BLD AUTO: 13.6 % (ref 2–12)
NEUTROPHILS # BLD AUTO: 8.2 K/UL (ref 1.8–8.9)
NEUTROPHILS NFR BLD AUTO: 70.1 % (ref 43–81)
PLATELET # BLD AUTO: 442 K/UL (ref 150–450)
POTASSIUM SERPL-SCNC: 4.9 MMOL/L (ref 3.5–5.1)
POTASSIUM SERPL-SCNC: 6.6 MMOL/L (ref 3.5–5.1)
RBC # BLD AUTO: 2.67 MIL/UL (ref 4–5.2)
SODIUM SERPL-SCNC: 133 MMOL/L (ref 136–145)
SODIUM SERPL-SCNC: 135 MMOL/L (ref 136–145)
WBC NRBC COR # BLD AUTO: 11.7 K/UL (ref 4.3–11)

## 2025-03-13 VITALS — DIASTOLIC BLOOD PRESSURE: 82 MMHG | SYSTOLIC BLOOD PRESSURE: 153 MMHG | OXYGEN SATURATION: 100 % | TEMPERATURE: 98.4 F

## 2025-03-13 VITALS — DIASTOLIC BLOOD PRESSURE: 79 MMHG | OXYGEN SATURATION: 100 % | TEMPERATURE: 99 F | SYSTOLIC BLOOD PRESSURE: 151 MMHG

## 2025-03-13 VITALS — DIASTOLIC BLOOD PRESSURE: 67 MMHG | SYSTOLIC BLOOD PRESSURE: 144 MMHG

## 2025-03-13 VITALS — OXYGEN SATURATION: 98 %

## 2025-03-13 RX ADMIN — Medication ONE MG: at 02:38

## 2025-03-13 RX ADMIN — Medication PRN MG: at 08:54

## 2025-03-13 RX ADMIN — BUPIVACAINE HYDROCHLORIDE ONE MG: 5 INJECTION, SOLUTION EPIDURAL; INTRACAUDAL; PERINEURAL at 09:29

## 2025-05-04 ENCOUNTER — HOSPITAL ENCOUNTER (INPATIENT)
Dept: HOSPITAL 54 - ER | Age: 50
LOS: 4 days | Discharge: HOME HEALTH | DRG: 264 | End: 2025-05-08
Attending: INTERNAL MEDICINE | Admitting: INTERNAL MEDICINE
Payer: MEDICARE

## 2025-05-04 VITALS — OXYGEN SATURATION: 100 %

## 2025-05-04 VITALS — TEMPERATURE: 98.8 F | OXYGEN SATURATION: 100 % | DIASTOLIC BLOOD PRESSURE: 85 MMHG | SYSTOLIC BLOOD PRESSURE: 170 MMHG

## 2025-05-04 VITALS — TEMPERATURE: 97.4 F | SYSTOLIC BLOOD PRESSURE: 155 MMHG | DIASTOLIC BLOOD PRESSURE: 78 MMHG | OXYGEN SATURATION: 98 %

## 2025-05-04 VITALS — OXYGEN SATURATION: 98 % | DIASTOLIC BLOOD PRESSURE: 83 MMHG | TEMPERATURE: 97.2 F | SYSTOLIC BLOOD PRESSURE: 152 MMHG

## 2025-05-04 DIAGNOSIS — I87.2: ICD-10-CM

## 2025-05-04 DIAGNOSIS — I25.10: ICD-10-CM

## 2025-05-04 DIAGNOSIS — I87.313: ICD-10-CM

## 2025-05-04 DIAGNOSIS — I13.2: Primary | ICD-10-CM

## 2025-05-04 DIAGNOSIS — Z95.1: ICD-10-CM

## 2025-05-04 DIAGNOSIS — E83.9: ICD-10-CM

## 2025-05-04 DIAGNOSIS — R53.1: ICD-10-CM

## 2025-05-04 DIAGNOSIS — E11.22: ICD-10-CM

## 2025-05-04 DIAGNOSIS — E44.0: ICD-10-CM

## 2025-05-04 DIAGNOSIS — Z87.891: ICD-10-CM

## 2025-05-04 DIAGNOSIS — N18.6: ICD-10-CM

## 2025-05-04 DIAGNOSIS — Z88.0: ICD-10-CM

## 2025-05-04 DIAGNOSIS — Z79.84: ICD-10-CM

## 2025-05-04 DIAGNOSIS — E66.01: ICD-10-CM

## 2025-05-04 DIAGNOSIS — J44.89: ICD-10-CM

## 2025-05-04 DIAGNOSIS — E87.1: ICD-10-CM

## 2025-05-04 DIAGNOSIS — J96.21: ICD-10-CM

## 2025-05-04 DIAGNOSIS — Z86.718: ICD-10-CM

## 2025-05-04 DIAGNOSIS — L97.828: ICD-10-CM

## 2025-05-04 DIAGNOSIS — E88.09: ICD-10-CM

## 2025-05-04 DIAGNOSIS — Z86.73: ICD-10-CM

## 2025-05-04 DIAGNOSIS — Z99.2: ICD-10-CM

## 2025-05-04 DIAGNOSIS — L97.818: ICD-10-CM

## 2025-05-04 DIAGNOSIS — I50.33: ICD-10-CM

## 2025-05-04 DIAGNOSIS — D63.8: ICD-10-CM

## 2025-05-04 DIAGNOSIS — G47.33: ICD-10-CM

## 2025-05-04 DIAGNOSIS — E78.5: ICD-10-CM

## 2025-05-04 DIAGNOSIS — N63.41: ICD-10-CM

## 2025-05-04 DIAGNOSIS — Z79.4: ICD-10-CM

## 2025-05-04 LAB
ALBUMIN SERPL BCP-MCNC: 2.9 G/DL (ref 3.4–5)
APTT PPP: 21.7 SEC (ref 24.3–34.3)
BASOPHILS # BLD AUTO: 0.2 K/UL (ref 0–0.2)
BASOPHILS NFR BLD AUTO: 1.3 % (ref 0–2)
BILIRUB DIRECT SERPL-MCNC: 0.1 MG/DL (ref 0–0.2)
BILIRUB SERPL-MCNC: 0.3 MG/DL (ref 0.2–1)
CALCIUM SERPL-MCNC: 9.5 MG/DL (ref 8.5–10.1)
CREAT SERPL-MCNC: 4.1 MG/DL (ref 0.6–1.3)
EOSINOPHIL # BLD AUTO: 0.7 K/UL (ref 0–0.7)
EOSINOPHIL NFR BLD AUTO: 4.9 % (ref 0–6)
ERYTHROCYTE [DISTWIDTH] IN BLOOD BY AUTOMATED COUNT: 19.2 % (ref 11.5–15)
HCT VFR BLD AUTO: 31 % (ref 33–45)
HGB BLD-MCNC: 9.4 G/DL (ref 11.5–14.8)
INR PPP: 1.02 (ref 0.91–1.1)
LYMPHOCYTES NFR BLD AUTO: 0.7 K/UL (ref 0.8–4.8)
LYMPHOCYTES NFR BLD AUTO: 5.3 % (ref 20–44)
MCH RBC QN AUTO: 29 PG (ref 26–33)
MCHC RBC AUTO-ENTMCNC: 31 G/DL (ref 31–36)
MCV RBC AUTO: 94 FL (ref 82–100)
MONOCYTES NFR BLD AUTO: 1.2 K/UL (ref 0.1–1.3)
MONOCYTES NFR BLD AUTO: 8.6 % (ref 2–12)
NEUTROPHILS # BLD AUTO: 10.9 K/UL (ref 1.8–8.9)
NEUTROPHILS NFR BLD AUTO: 79.9 % (ref 43–81)
PHOSPHATE SERPL-MCNC: 4.4 MG/DL (ref 2.5–4.9)
PLATELET # BLD AUTO: 506 K/UL (ref 150–450)
POTASSIUM SERPL-SCNC: 4.1 MMOL/L (ref 3.5–5.1)
PROT SERPL-MCNC: 7.6 G/DL (ref 6.4–8.2)
PROTHROMBIN TIME: 10.8 SECS (ref 9.2–11.1)
RBC # BLD AUTO: 3.24 MIL/UL (ref 4–5.2)
WBC NRBC COR # BLD AUTO: 13.6 K/UL (ref 4.3–11)

## 2025-05-04 PROCEDURE — A6403 STERILE GAUZE>16 <= 48 SQ IN: HCPCS

## 2025-05-04 PROCEDURE — A6253 ABSORPT DRG > 48 SQ IN W/O B: HCPCS

## 2025-05-04 PROCEDURE — G0378 HOSPITAL OBSERVATION PER HR: HCPCS

## 2025-05-04 RX ADMIN — SODIUM CHLORIDE SCH MG: 9 INJECTION, SOLUTION INTRAVENOUS at 11:03

## 2025-05-04 RX ADMIN — DEXTROSE MONOHYDRATE ONE MG: 50 INJECTION, SOLUTION INTRAVENOUS at 04:25

## 2025-05-04 RX ADMIN — INSULIN HUMAN PRN UNITS: 100 INJECTION, SOLUTION PARENTERAL at 13:01

## 2025-05-04 RX ADMIN — Medication SCH MG: at 19:41

## 2025-05-04 RX ADMIN — HUMAN INSULIN PRN UNIT: 100 INJECTION, SOLUTION SUBCUTANEOUS at 21:56

## 2025-05-04 RX ADMIN — HEPARIN SODIUM SCH UNITS: 5000 INJECTION INTRAVENOUS; SUBCUTANEOUS at 11:03

## 2025-05-04 RX ADMIN — Medication SCH EACH: at 09:36

## 2025-05-04 RX ADMIN — ALBUTEROL SULFATE SCH MG: 2.5 SOLUTION RESPIRATORY (INHALATION) at 19:41

## 2025-05-04 RX ADMIN — ACETAMINOPHEN PRN MG: 325 TABLET ORAL at 11:04

## 2025-05-04 RX ADMIN — FUROSEMIDE ONE MG: 10 INJECTION, SOLUTION INTRAMUSCULAR; INTRAVENOUS at 02:00

## 2025-05-04 RX ADMIN — Medication ONE MG: at 04:25

## 2025-05-04 RX ADMIN — ALBUTEROL SULFATE ONE MG: 2.5 SOLUTION RESPIRATORY (INHALATION) at 01:48

## 2025-05-04 RX ADMIN — LORAZEPAM ONE MG: 2 INJECTION INTRAMUSCULAR; INTRAVENOUS at 04:30

## 2025-05-04 RX ADMIN — Medication PRN MG: at 07:49

## 2025-05-04 RX ADMIN — KETOROLAC TROMETHAMINE ONE MG: 30 INJECTION, SOLUTION INTRAMUSCULAR at 03:30

## 2025-05-05 VITALS — OXYGEN SATURATION: 98 % | DIASTOLIC BLOOD PRESSURE: 98 MMHG | TEMPERATURE: 97.5 F | SYSTOLIC BLOOD PRESSURE: 189 MMHG

## 2025-05-05 VITALS — DIASTOLIC BLOOD PRESSURE: 80 MMHG | OXYGEN SATURATION: 98 % | SYSTOLIC BLOOD PRESSURE: 190 MMHG

## 2025-05-05 VITALS — TEMPERATURE: 98.2 F | SYSTOLIC BLOOD PRESSURE: 172 MMHG | OXYGEN SATURATION: 96 % | DIASTOLIC BLOOD PRESSURE: 85 MMHG

## 2025-05-05 VITALS — DIASTOLIC BLOOD PRESSURE: 78 MMHG | TEMPERATURE: 98.2 F | SYSTOLIC BLOOD PRESSURE: 153 MMHG | OXYGEN SATURATION: 99 %

## 2025-05-05 VITALS — DIASTOLIC BLOOD PRESSURE: 84 MMHG | OXYGEN SATURATION: 97 % | TEMPERATURE: 98.6 F | SYSTOLIC BLOOD PRESSURE: 158 MMHG

## 2025-05-05 VITALS — OXYGEN SATURATION: 100 %

## 2025-05-05 VITALS — OXYGEN SATURATION: 99 %

## 2025-05-05 VITALS — SYSTOLIC BLOOD PRESSURE: 190 MMHG | DIASTOLIC BLOOD PRESSURE: 80 MMHG

## 2025-05-05 VITALS — SYSTOLIC BLOOD PRESSURE: 178 MMHG | DIASTOLIC BLOOD PRESSURE: 85 MMHG

## 2025-05-05 VITALS — OXYGEN SATURATION: 100 % | TEMPERATURE: 97.5 F | DIASTOLIC BLOOD PRESSURE: 94 MMHG | SYSTOLIC BLOOD PRESSURE: 174 MMHG

## 2025-05-05 VITALS — OXYGEN SATURATION: 97 %

## 2025-05-05 VITALS — DIASTOLIC BLOOD PRESSURE: 82 MMHG | SYSTOLIC BLOOD PRESSURE: 171 MMHG

## 2025-05-05 LAB
BASOPHILS NFR BLD AUTO: 0.3 % (ref 0–2)
CALCIUM SERPL-MCNC: 9.6 MG/DL (ref 8.5–10.1)
ERYTHROCYTE [DISTWIDTH] IN BLOOD BY AUTOMATED COUNT: 19.1 % (ref 11.5–15)
HCT VFR BLD AUTO: 30 % (ref 33–45)
HGB BLD-MCNC: 9.2 G/DL (ref 11.5–14.8)
LYMPHOCYTES NFR BLD AUTO: 0.4 K/UL (ref 0.8–4.8)
LYMPHOCYTES NFR BLD AUTO: 3.8 % (ref 20–44)
MCH RBC QN AUTO: 29 PG (ref 26–33)
MCHC RBC AUTO-ENTMCNC: 31 G/DL (ref 31–36)
MCV RBC AUTO: 94 FL (ref 82–100)
MONOCYTES NFR BLD AUTO: 0.8 K/UL (ref 0.1–1.3)
MONOCYTES NFR BLD AUTO: 6.7 % (ref 2–12)
NEUTROPHILS # BLD AUTO: 10.5 K/UL (ref 1.8–8.9)
NEUTROPHILS NFR BLD AUTO: 89.2 % (ref 43–81)
PLATELET # BLD AUTO: 516 K/UL (ref 150–450)
POTASSIUM SERPL-SCNC: 4.6 MMOL/L (ref 3.5–5.1)
RBC # BLD AUTO: 3.17 MIL/UL (ref 4–5.2)
WBC NRBC COR # BLD AUTO: 11.8 K/UL (ref 4.3–11)

## 2025-05-05 PROCEDURE — 5A1D70Z PERFORMANCE OF URINARY FILTRATION, INTERMITTENT, LESS THAN 6 HOURS PER DAY: ICD-10-PCS | Performed by: INTERNAL MEDICINE

## 2025-05-05 RX ADMIN — NIFEDIPINE SCH MG: 60 TABLET, EXTENDED RELEASE ORAL at 08:39

## 2025-05-05 RX ADMIN — MONTELUKAST SODIUM SCH MG: 10 TABLET, FILM COATED ORAL at 21:52

## 2025-05-05 RX ADMIN — OXYCODONE HYDROCHLORIDE AND ACETAMINOPHEN SCH MG: 500 TABLET ORAL at 08:43

## 2025-05-05 RX ADMIN — LIDOCAINE SCH GM: 50 OINTMENT TOPICAL at 20:30

## 2025-05-05 RX ADMIN — RENAGEL SCH MG: 800 TABLET ORAL at 08:42

## 2025-05-05 RX ADMIN — HYDRALAZINE HYDROCHLORIDE ONE MG: 20 INJECTION INTRAMUSCULAR; INTRAVENOUS at 02:12

## 2025-05-05 RX ADMIN — CLONIDINE HYDROCHLORIDE SCH MG: 0.1 TABLET ORAL at 13:00

## 2025-05-05 RX ADMIN — Medication SCH MG: at 08:43

## 2025-05-05 RX ADMIN — LABETALOL HCL SCH MG: 100 TABLET, FILM COATED ORAL at 08:38

## 2025-05-05 RX ADMIN — FERROUS SULFATE TAB 325 MG (65 MG ELEMENTAL FE) SCH MG: 325 (65 FE) TAB at 08:43

## 2025-05-05 RX ADMIN — SERTRALINE HYDROCHLORIDE SCH MG: 25 TABLET, FILM COATED ORAL at 08:39

## 2025-05-05 RX ADMIN — Medication SCH MG: at 08:42

## 2025-05-05 RX ADMIN — BUPIVACAINE HYDROCHLORIDE ONE MG: 5 INJECTION, SOLUTION EPIDURAL; INTRACAUDAL; PERINEURAL at 20:30

## 2025-05-05 RX ADMIN — CLONIDINE HYDROCHLORIDE SCH MG: 0.1 TABLET ORAL at 08:39

## 2025-05-05 RX ADMIN — PANTOPRAZOLE SODIUM SCH MG: 40 TABLET, DELAYED RELEASE ORAL at 08:42

## 2025-05-05 RX ADMIN — SUMATRIPTAN SUCCINATE SCH MG: 100 TABLET, FILM COATED ORAL at 12:16

## 2025-05-05 RX ADMIN — SODIUM HYPOCHLORITE SCH ML: 1.25 SOLUTION TOPICAL at 20:30

## 2025-05-05 RX ADMIN — Medication SCH TAB: at 08:43

## 2025-05-06 VITALS — DIASTOLIC BLOOD PRESSURE: 78 MMHG | TEMPERATURE: 97.7 F | SYSTOLIC BLOOD PRESSURE: 148 MMHG | OXYGEN SATURATION: 98 %

## 2025-05-06 VITALS — TEMPERATURE: 97.4 F | OXYGEN SATURATION: 97 % | DIASTOLIC BLOOD PRESSURE: 90 MMHG | SYSTOLIC BLOOD PRESSURE: 174 MMHG

## 2025-05-06 VITALS — DIASTOLIC BLOOD PRESSURE: 77 MMHG | OXYGEN SATURATION: 99 % | SYSTOLIC BLOOD PRESSURE: 159 MMHG | TEMPERATURE: 98.2 F

## 2025-05-06 VITALS — SYSTOLIC BLOOD PRESSURE: 159 MMHG | OXYGEN SATURATION: 99 % | TEMPERATURE: 98.2 F | DIASTOLIC BLOOD PRESSURE: 89 MMHG

## 2025-05-06 VITALS — OXYGEN SATURATION: 99 %

## 2025-05-06 VITALS — OXYGEN SATURATION: 98 %

## 2025-05-06 VITALS — DIASTOLIC BLOOD PRESSURE: 79 MMHG | SYSTOLIC BLOOD PRESSURE: 134 MMHG | OXYGEN SATURATION: 99 % | TEMPERATURE: 97.7 F

## 2025-05-06 VITALS — TEMPERATURE: 97.5 F | SYSTOLIC BLOOD PRESSURE: 164 MMHG | OXYGEN SATURATION: 100 % | DIASTOLIC BLOOD PRESSURE: 86 MMHG

## 2025-05-06 PROCEDURE — 0JBN0ZZ EXCISION OF RIGHT LOWER LEG SUBCUTANEOUS TISSUE AND FASCIA, OPEN APPROACH: ICD-10-PCS | Performed by: STUDENT IN AN ORGANIZED HEALTH CARE EDUCATION/TRAINING PROGRAM

## 2025-05-06 PROCEDURE — 0JBP0ZZ EXCISION OF LEFT LOWER LEG SUBCUTANEOUS TISSUE AND FASCIA, OPEN APPROACH: ICD-10-PCS | Performed by: STUDENT IN AN ORGANIZED HEALTH CARE EDUCATION/TRAINING PROGRAM

## 2025-05-06 RX ADMIN — SUMATRIPTAN SUCCINATE SCH MG: 25 TABLET, FILM COATED ORAL at 08:32

## 2025-05-07 VITALS — OXYGEN SATURATION: 99 %

## 2025-05-07 VITALS — SYSTOLIC BLOOD PRESSURE: 135 MMHG | OXYGEN SATURATION: 100 % | TEMPERATURE: 97.5 F | DIASTOLIC BLOOD PRESSURE: 68 MMHG

## 2025-05-07 VITALS — SYSTOLIC BLOOD PRESSURE: 141 MMHG | DIASTOLIC BLOOD PRESSURE: 77 MMHG | OXYGEN SATURATION: 97 % | TEMPERATURE: 97.7 F

## 2025-05-07 VITALS — DIASTOLIC BLOOD PRESSURE: 79 MMHG | OXYGEN SATURATION: 100 % | TEMPERATURE: 97.8 F | SYSTOLIC BLOOD PRESSURE: 169 MMHG

## 2025-05-07 VITALS — SYSTOLIC BLOOD PRESSURE: 154 MMHG | OXYGEN SATURATION: 100 % | TEMPERATURE: 97.8 F | DIASTOLIC BLOOD PRESSURE: 79 MMHG

## 2025-05-07 VITALS — TEMPERATURE: 98.1 F | OXYGEN SATURATION: 100 % | SYSTOLIC BLOOD PRESSURE: 152 MMHG | DIASTOLIC BLOOD PRESSURE: 86 MMHG

## 2025-05-07 LAB
BASOPHILS # BLD AUTO: 0.1 K/UL (ref 0–0.2)
BASOPHILS NFR BLD AUTO: 0.5 % (ref 0–2)
CALCIUM SERPL-MCNC: 8.9 MG/DL (ref 8.5–10.1)
CREAT SERPL-MCNC: 5.1 MG/DL (ref 0.6–1.3)
EOSINOPHIL # BLD AUTO: 0.4 K/UL (ref 0–0.7)
EOSINOPHIL NFR BLD AUTO: 3.3 % (ref 0–6)
ERYTHROCYTE [DISTWIDTH] IN BLOOD BY AUTOMATED COUNT: 19.1 % (ref 11.5–15)
HCT VFR BLD AUTO: 30 % (ref 33–45)
LYMPHOCYTES NFR BLD AUTO: 0.8 K/UL (ref 0.8–4.8)
LYMPHOCYTES NFR BLD AUTO: 6.4 % (ref 20–44)
MAGNESIUM SERPL-MCNC: 2.1 MG/DL (ref 1.8–2.4)
MCH RBC QN AUTO: 29 PG (ref 26–33)
MCHC RBC AUTO-ENTMCNC: 31 G/DL (ref 31–36)
MCV RBC AUTO: 94 FL (ref 82–100)
MONOCYTES NFR BLD AUTO: 1.1 K/UL (ref 0.1–1.3)
MONOCYTES NFR BLD AUTO: 9.2 % (ref 2–12)
NEUTROPHILS # BLD AUTO: 9.7 K/UL (ref 1.8–8.9)
NEUTROPHILS NFR BLD AUTO: 80.6 % (ref 43–81)
PHOSPHATE SERPL-MCNC: 5.6 MG/DL (ref 2.5–4.9)
PLATELET # BLD AUTO: 453 K/UL (ref 150–450)
POTASSIUM SERPL-SCNC: 4.7 MMOL/L (ref 3.5–5.1)
RBC # BLD AUTO: 3.14 MIL/UL (ref 4–5.2)

## 2025-05-07 RX ADMIN — EPOETIN ALFA ONE UNIT: 10000 SOLUTION INTRAVENOUS; SUBCUTANEOUS at 16:50

## 2025-05-08 VITALS — TEMPERATURE: 97.5 F | OXYGEN SATURATION: 100 % | DIASTOLIC BLOOD PRESSURE: 84 MMHG | SYSTOLIC BLOOD PRESSURE: 164 MMHG

## 2025-05-08 VITALS — SYSTOLIC BLOOD PRESSURE: 157 MMHG | DIASTOLIC BLOOD PRESSURE: 78 MMHG | TEMPERATURE: 97.7 F | OXYGEN SATURATION: 99 %

## 2025-07-06 ENCOUNTER — HOSPITAL ENCOUNTER (INPATIENT)
Dept: HOSPITAL 54 - ER | Age: 50
LOS: 10 days | Discharge: HOME HEALTH | DRG: 981 | End: 2025-07-16
Attending: NURSE PRACTITIONER | Admitting: INTERNAL MEDICINE
Payer: MEDICARE

## 2025-07-06 VITALS — SYSTOLIC BLOOD PRESSURE: 101 MMHG | OXYGEN SATURATION: 100 % | DIASTOLIC BLOOD PRESSURE: 75 MMHG

## 2025-07-06 VITALS — OXYGEN SATURATION: 100 % | DIASTOLIC BLOOD PRESSURE: 48 MMHG | SYSTOLIC BLOOD PRESSURE: 90 MMHG

## 2025-07-06 VITALS — HEIGHT: 61 IN | BODY MASS INDEX: 54.56 KG/M2 | WEIGHT: 289 LBS

## 2025-07-06 VITALS — OXYGEN SATURATION: 100 % | DIASTOLIC BLOOD PRESSURE: 75 MMHG | SYSTOLIC BLOOD PRESSURE: 150 MMHG | TEMPERATURE: 98.7 F

## 2025-07-06 VITALS — OXYGEN SATURATION: 97 %

## 2025-07-06 VITALS — OXYGEN SATURATION: 98 %

## 2025-07-06 VITALS — DIASTOLIC BLOOD PRESSURE: 104 MMHG | SYSTOLIC BLOOD PRESSURE: 152 MMHG | OXYGEN SATURATION: 100 %

## 2025-07-06 DIAGNOSIS — I13.2: ICD-10-CM

## 2025-07-06 DIAGNOSIS — E44.0: ICD-10-CM

## 2025-07-06 DIAGNOSIS — Z95.1: ICD-10-CM

## 2025-07-06 DIAGNOSIS — I25.119: ICD-10-CM

## 2025-07-06 DIAGNOSIS — I87.313: ICD-10-CM

## 2025-07-06 DIAGNOSIS — Z88.0: ICD-10-CM

## 2025-07-06 DIAGNOSIS — E11.40: ICD-10-CM

## 2025-07-06 DIAGNOSIS — E78.5: ICD-10-CM

## 2025-07-06 DIAGNOSIS — L97.819: ICD-10-CM

## 2025-07-06 DIAGNOSIS — Z79.85: ICD-10-CM

## 2025-07-06 DIAGNOSIS — I50.23: ICD-10-CM

## 2025-07-06 DIAGNOSIS — Z53.20: ICD-10-CM

## 2025-07-06 DIAGNOSIS — E87.1: ICD-10-CM

## 2025-07-06 DIAGNOSIS — D64.9: ICD-10-CM

## 2025-07-06 DIAGNOSIS — Z87.891: ICD-10-CM

## 2025-07-06 DIAGNOSIS — Z99.2: ICD-10-CM

## 2025-07-06 DIAGNOSIS — J44.0: ICD-10-CM

## 2025-07-06 DIAGNOSIS — Z82.49: ICD-10-CM

## 2025-07-06 DIAGNOSIS — R91.8: ICD-10-CM

## 2025-07-06 DIAGNOSIS — Z16.23: ICD-10-CM

## 2025-07-06 DIAGNOSIS — Z79.899: ICD-10-CM

## 2025-07-06 DIAGNOSIS — J96.01: ICD-10-CM

## 2025-07-06 DIAGNOSIS — Z79.84: ICD-10-CM

## 2025-07-06 DIAGNOSIS — Z79.4: ICD-10-CM

## 2025-07-06 DIAGNOSIS — E88.09: ICD-10-CM

## 2025-07-06 DIAGNOSIS — Z86.73: ICD-10-CM

## 2025-07-06 DIAGNOSIS — E66.01: ICD-10-CM

## 2025-07-06 DIAGNOSIS — Z79.82: ICD-10-CM

## 2025-07-06 DIAGNOSIS — J15.69: Primary | ICD-10-CM

## 2025-07-06 DIAGNOSIS — G47.33: ICD-10-CM

## 2025-07-06 DIAGNOSIS — E11.22: ICD-10-CM

## 2025-07-06 DIAGNOSIS — M19.90: ICD-10-CM

## 2025-07-06 DIAGNOSIS — Z86.718: ICD-10-CM

## 2025-07-06 DIAGNOSIS — I87.2: ICD-10-CM

## 2025-07-06 DIAGNOSIS — N18.6: ICD-10-CM

## 2025-07-06 DIAGNOSIS — M89.8X9: ICD-10-CM

## 2025-07-06 DIAGNOSIS — G43.909: ICD-10-CM

## 2025-07-06 DIAGNOSIS — L97.829: ICD-10-CM

## 2025-07-06 DIAGNOSIS — Z98.890: ICD-10-CM

## 2025-07-06 LAB
ALBUMIN SERPL BCP-MCNC: 2.3 G/DL (ref 3.4–5)
ALP SERPL-CCNC: 618 U/L (ref 46–116)
ALT SERPL W P-5'-P-CCNC: 6 U/L (ref 12–78)
ANISOCYTOSIS BLD QL: (no result)
APTT PPP: 35.6 SEC (ref 24.3–34.3)
AST SERPL W P-5'-P-CCNC: 38 U/L (ref 15–37)
AUER BODIES BLD QL SMEAR: (no result)
BASE EXCESS BLDA CALC-SCNC: -2.6 MMOL/L (ref -2–3)
BASOPHILS # BLD AUTO: 0.1 K/UL (ref 0–0.2)
BASOPHILS NFR BLD AUTO: 0.5 % (ref 0–2)
BILIRUB DIRECT SERPL-MCNC: 1.2 MG/DL (ref 0–0.2)
BILIRUB SERPL-MCNC: 1.6 MG/DL (ref 0.2–1)
BUN SERPL-MCNC: 45 MG/DL (ref 7–18)
CABOT RINGS BLD QL SMEAR: (no result)
CALCIUM SERPL-MCNC: 8.9 MG/DL (ref 8.5–10.1)
CHLORIDE SERPL-SCNC: 87 MMOL/L (ref 98–107)
CO2 SERPL-SCNC: 24 MMOL/L (ref 21–32)
COHGB MFR BLDA: 1.2 % (ref 0.5–1.5)
CREAT SERPL-MCNC: 4.8 MG/DL (ref 0.6–1.3)
DACRYOCYTES BLD QL SMEAR: (no result)
DO-HGB MFR BLDA: (no result) MMHG
DOHLE BOD BLD QL SMEAR: (no result)
EOSINOPHIL # BLD AUTO: 0.1 K/UL (ref 0–0.7)
EOSINOPHIL NFR BLD AUTO: 0.5 % (ref 0–6)
ERYTHROCYTE [DISTWIDTH] IN BLOOD BY AUTOMATED COUNT: 20.6 % (ref 11.5–15)
GAS PNL BLDA: 9.9 G/DL (ref 12–16)
GLUCOSE SERPL-MCNC: 132 MG/DL (ref 74–106)
HCT VFR BLD AUTO: 30 % (ref 33–45)
HELMET CELLS BLD QL SMEAR: (no result)
HGB BLD-MCNC: 9.3 G/DL (ref 11.5–14.8)
HOWELL-JOLLY BOD BLD QL SMEAR: (no result)
HYPOCHROMIA BLD QL: (no result)
INHALED O2 CONCENTRATION: 60 %
INHALED O2 FLOW RATE: 40 L/MIN (ref 0–30)
INR PPP: 1.11 (ref 0.91–1.1)
INTRINSIC PEEP RESPIRATORY: (no result) CM H2O
LACTATE SERPL-SCNC: 0.8 MMOL/L (ref 0.4–2)
LYMPHOCYTES NFR BLD AUTO: 0.4 K/UL (ref 0.8–4.8)
LYMPHOCYTES NFR BLD AUTO: 2.5 % (ref 20–44)
MACROCYTES BLD QL: (no result)
MCH RBC QN AUTO: 29 PG (ref 26–33)
MCHC RBC AUTO-ENTMCNC: 31 G/DL (ref 31–36)
MCV RBC AUTO: 95 FL (ref 82–100)
METHGB MFR BLDA: 0.3 % (ref 0–1.5)
MONOCYTES NFR BLD AUTO: 1.7 K/UL (ref 0.1–1.3)
MONOCYTES NFR BLD AUTO: 9.7 % (ref 2–12)
NEUTROPHILS # BLD AUTO: 14.9 K/UL (ref 1.8–8.9)
NEUTROPHILS NFR BLD AUTO: 86.8 % (ref 43–81)
NT-PROBNP SERPL-MCNC: (no result) PG/ML (ref 0–125)
OVALOCYTES BLD QL SMEAR: (no result)
OXYHGB MFR BLDA: 96.8 % (ref 94–97)
PAPPENHEIMER BOD BLD QL SMEAR: (no result)
PCO2 TEMP ADJ BLDA: 43.1 MMHG (ref 32–45)
PEEP SETTING VENT: (no result) ML
PH TEMP ADJ BLDA: 7.34 [PH] (ref 7.35–7.45)
PLATELET # BLD AUTO: 417 K/UL (ref 150–450)
PLATELET BLD QL SMEAR: (no result)
PO2 TEMP ADJ BLDA: 130.5 MMHG (ref 83–108)
POTASSIUM SERPL-SCNC: 5.4 MMOL/L (ref 3.5–5.1)
PROT SERPL-MCNC: 6.7 G/DL (ref 6.4–8.2)
PROTHROMBIN TIME: 11.7 SECS (ref 9.2–11.1)
RBC # BLD AUTO: 3.18 MIL/UL (ref 4–5.2)
ROULEAUX BLD QL SMEAR: (no result)
SAO2 % BLDA: 98.3 % (ref 94–98)
SET RATE, BG: (no result)
SODIUM SERPL-SCNC: 123 MMOL/L (ref 136–145)
SPECIMEN DRAWN FROM PATIENT: (no result)
STOMATOCYTES BLD QL SMEAR: (no result)
TARGETS BLD QL SMEAR: (no result)
TOXIC GRANULES BLD QL SMEAR: (no result)
VENTILATION MODE VENT: (no result)
WBC NRBC COR # BLD AUTO: 17.2 K/UL (ref 4.3–11)

## 2025-07-06 PROCEDURE — A4217 STERILE WATER/SALINE, 500 ML: HCPCS

## 2025-07-06 PROCEDURE — A4223 INFUSION SUPPLIES W/O PUMP: HCPCS

## 2025-07-06 PROCEDURE — A6253 ABSORPT DRG > 48 SQ IN W/O B: HCPCS

## 2025-07-06 PROCEDURE — A4216 STERILE WATER/SALINE, 10 ML: HCPCS

## 2025-07-06 PROCEDURE — A6403 STERILE GAUZE>16 <= 48 SQ IN: HCPCS

## 2025-07-06 PROCEDURE — 5A1D70Z PERFORMANCE OF URINARY FILTRATION, INTERMITTENT, LESS THAN 6 HOURS PER DAY: ICD-10-PCS | Performed by: INTERNAL MEDICINE

## 2025-07-06 PROCEDURE — G0378 HOSPITAL OBSERVATION PER HR: HCPCS

## 2025-07-06 RX ADMIN — HUMAN INSULIN PRN UNIT: 100 INJECTION, SOLUTION SUBCUTANEOUS at 22:17

## 2025-07-06 RX ADMIN — MEROPENEM ONE MG: 500 INJECTION INTRAVENOUS at 23:19

## 2025-07-06 RX ADMIN — GABAPENTIN SCH MG: 300 CAPSULE ORAL at 22:05

## 2025-07-06 RX ADMIN — LEVOFLOXACIN ONE MLS/HR: 750 INJECTION, SOLUTION INTRAVENOUS at 20:28

## 2025-07-06 RX ADMIN — Medication PRN TAB: at 20:40

## 2025-07-06 RX ADMIN — DEXTROSE MONOHYDRATE ONE MLS/HR: 50 INJECTION, SOLUTION INTRAVENOUS at 16:48

## 2025-07-06 RX ADMIN — AZTREONAM ONE MLS/HR: 1 INJECTION, POWDER, FOR SOLUTION INTRAMUSCULAR; INTRAVENOUS at 16:01

## 2025-07-06 RX ADMIN — DEXTROSE MONOHYDRATE PRN MLS/HR: 50 INJECTION, SOLUTION INTRAVENOUS at 23:14

## 2025-07-06 RX ADMIN — Medication SCH EACH: at 22:17

## 2025-07-06 RX ADMIN — MEROPENEM ONE MLS/HR: 500 INJECTION INTRAVENOUS at 22:29

## 2025-07-06 RX ADMIN — MONTELUKAST SODIUM SCH MG: 10 TABLET, FILM COATED ORAL at 22:05

## 2025-07-06 RX ADMIN — ACETAMINOPHEN ONE MG: 500 TABLET ORAL at 16:16

## 2025-07-06 RX ADMIN — SODIUM CHLORIDE PRN MLS/HR: 9 INJECTION, SOLUTION INTRAVENOUS at 20:27

## 2025-07-07 VITALS — OXYGEN SATURATION: 99 % | DIASTOLIC BLOOD PRESSURE: 31 MMHG | SYSTOLIC BLOOD PRESSURE: 97 MMHG

## 2025-07-07 VITALS — SYSTOLIC BLOOD PRESSURE: 102 MMHG | OXYGEN SATURATION: 100 % | DIASTOLIC BLOOD PRESSURE: 46 MMHG

## 2025-07-07 VITALS — OXYGEN SATURATION: 100 % | SYSTOLIC BLOOD PRESSURE: 105 MMHG | DIASTOLIC BLOOD PRESSURE: 41 MMHG

## 2025-07-07 VITALS — OXYGEN SATURATION: 97 % | DIASTOLIC BLOOD PRESSURE: 33 MMHG | SYSTOLIC BLOOD PRESSURE: 93 MMHG

## 2025-07-07 VITALS — OXYGEN SATURATION: 95 % | DIASTOLIC BLOOD PRESSURE: 60 MMHG | SYSTOLIC BLOOD PRESSURE: 124 MMHG

## 2025-07-07 VITALS — SYSTOLIC BLOOD PRESSURE: 117 MMHG | DIASTOLIC BLOOD PRESSURE: 47 MMHG | OXYGEN SATURATION: 95 %

## 2025-07-07 VITALS — DIASTOLIC BLOOD PRESSURE: 56 MMHG | OXYGEN SATURATION: 94 % | SYSTOLIC BLOOD PRESSURE: 96 MMHG | TEMPERATURE: 98.4 F

## 2025-07-07 VITALS — DIASTOLIC BLOOD PRESSURE: 62 MMHG | SYSTOLIC BLOOD PRESSURE: 122 MMHG | OXYGEN SATURATION: 97 %

## 2025-07-07 VITALS — OXYGEN SATURATION: 97 % | SYSTOLIC BLOOD PRESSURE: 100 MMHG | DIASTOLIC BLOOD PRESSURE: 46 MMHG

## 2025-07-07 VITALS — OXYGEN SATURATION: 95 % | DIASTOLIC BLOOD PRESSURE: 71 MMHG | SYSTOLIC BLOOD PRESSURE: 120 MMHG

## 2025-07-07 VITALS — SYSTOLIC BLOOD PRESSURE: 108 MMHG | DIASTOLIC BLOOD PRESSURE: 51 MMHG | OXYGEN SATURATION: 99 % | TEMPERATURE: 98.4 F

## 2025-07-07 VITALS — DIASTOLIC BLOOD PRESSURE: 36 MMHG | SYSTOLIC BLOOD PRESSURE: 91 MMHG | OXYGEN SATURATION: 95 %

## 2025-07-07 VITALS — OXYGEN SATURATION: 99 %

## 2025-07-07 VITALS — TEMPERATURE: 98.4 F | SYSTOLIC BLOOD PRESSURE: 113 MMHG | DIASTOLIC BLOOD PRESSURE: 43 MMHG | OXYGEN SATURATION: 97 %

## 2025-07-07 VITALS — SYSTOLIC BLOOD PRESSURE: 99 MMHG | OXYGEN SATURATION: 100 % | DIASTOLIC BLOOD PRESSURE: 47 MMHG | TEMPERATURE: 98.6 F

## 2025-07-07 VITALS — OXYGEN SATURATION: 100 % | SYSTOLIC BLOOD PRESSURE: 110 MMHG | DIASTOLIC BLOOD PRESSURE: 79 MMHG | TEMPERATURE: 98 F

## 2025-07-07 VITALS — OXYGEN SATURATION: 99 % | SYSTOLIC BLOOD PRESSURE: 107 MMHG | DIASTOLIC BLOOD PRESSURE: 50 MMHG

## 2025-07-07 VITALS — OXYGEN SATURATION: 100 % | SYSTOLIC BLOOD PRESSURE: 102 MMHG | DIASTOLIC BLOOD PRESSURE: 54 MMHG

## 2025-07-07 VITALS — DIASTOLIC BLOOD PRESSURE: 56 MMHG | OXYGEN SATURATION: 95 % | SYSTOLIC BLOOD PRESSURE: 99 MMHG

## 2025-07-07 VITALS — OXYGEN SATURATION: 98 %

## 2025-07-07 VITALS — DIASTOLIC BLOOD PRESSURE: 52 MMHG | SYSTOLIC BLOOD PRESSURE: 104 MMHG | OXYGEN SATURATION: 100 %

## 2025-07-07 VITALS — SYSTOLIC BLOOD PRESSURE: 112 MMHG | DIASTOLIC BLOOD PRESSURE: 43 MMHG | OXYGEN SATURATION: 100 %

## 2025-07-07 VITALS — OXYGEN SATURATION: 95 % | DIASTOLIC BLOOD PRESSURE: 49 MMHG | SYSTOLIC BLOOD PRESSURE: 111 MMHG

## 2025-07-07 VITALS — SYSTOLIC BLOOD PRESSURE: 123 MMHG | DIASTOLIC BLOOD PRESSURE: 51 MMHG

## 2025-07-07 VITALS — DIASTOLIC BLOOD PRESSURE: 63 MMHG | OXYGEN SATURATION: 99 % | SYSTOLIC BLOOD PRESSURE: 121 MMHG

## 2025-07-07 VITALS — DIASTOLIC BLOOD PRESSURE: 55 MMHG | OXYGEN SATURATION: 95 % | SYSTOLIC BLOOD PRESSURE: 118 MMHG

## 2025-07-07 VITALS — DIASTOLIC BLOOD PRESSURE: 43 MMHG | SYSTOLIC BLOOD PRESSURE: 106 MMHG | OXYGEN SATURATION: 95 %

## 2025-07-07 VITALS — OXYGEN SATURATION: 95 % | DIASTOLIC BLOOD PRESSURE: 56 MMHG | TEMPERATURE: 98.5 F | SYSTOLIC BLOOD PRESSURE: 98 MMHG

## 2025-07-07 LAB
ANISOCYTOSIS BLD QL: (no result)
AUER BODIES BLD QL SMEAR: (no result)
BASOPHILS # BLD AUTO: 0.2 K/UL (ref 0–0.2)
BASOPHILS NFR BLD AUTO: 0.8 % (ref 0–2)
BASOPHILS NFR BLD MANUAL: (no result) % (ref 0–2)
BLASTS NFR BLD MANUAL: (no result) % (ref 0–0)
BUN SERPL-MCNC: 32 MG/DL (ref 7–18)
CABOT RINGS BLD QL SMEAR: (no result)
CALCIUM SERPL-MCNC: 8.8 MG/DL (ref 8.5–10.1)
CHLORIDE SERPL-SCNC: 93 MMOL/L (ref 98–107)
CO2 SERPL-SCNC: 27 MMOL/L (ref 21–32)
CREAT SERPL-MCNC: 3.9 MG/DL (ref 0.6–1.3)
DACRYOCYTES BLD QL SMEAR: (no result)
DOHLE BOD BLD QL SMEAR: (no result)
EOSINOPHIL # BLD AUTO: 0 K/UL (ref 0–0.7)
EOSINOPHIL NFR BLD AUTO: 0 % (ref 0–6)
EOSINOPHIL NFR BLD MANUAL: (no result) % (ref 0–4)
ERYTHROCYTE [DISTWIDTH] IN BLOOD BY AUTOMATED COUNT: 20.5 % (ref 11.5–15)
GLUCOSE SERPL-MCNC: 108 MG/DL (ref 74–106)
HBV SURFACE AG SERPL QL NT: (no result)
HCT VFR BLD AUTO: 29 % (ref 33–45)
HELMET CELLS BLD QL SMEAR: (no result)
HGB BLD-MCNC: 8.8 G/DL (ref 11.5–14.8)
HOWELL-JOLLY BOD BLD QL SMEAR: (no result)
HYPOCHROMIA BLD QL: (no result)
LYMPHOCYTES NFR BLD AUTO: 0.5 K/UL (ref 0.8–4.8)
LYMPHOCYTES NFR BLD AUTO: 1.5 % (ref 20–44)
LYMPHOCYTES NFR BLD MANUAL: 4 % (ref 16–48)
MACROCYTES BLD QL: (no result)
MAGNESIUM SERPL-MCNC: 2 MG/DL (ref 1.8–2.4)
MCH RBC QN AUTO: 30 PG (ref 26–33)
MCHC RBC AUTO-ENTMCNC: 31 G/DL (ref 31–36)
MCV RBC AUTO: 96 FL (ref 82–100)
METAMYELOCYTES NFR BLD MANUAL: (no result) % (ref 0–0)
MICRO BUFFY COAT: (no result)
MONOCYTES NFR BLD AUTO: 2.2 K/UL (ref 0.1–1.3)
MONOCYTES NFR BLD AUTO: 7 % (ref 2–12)
MONOCYTES NFR BLD MANUAL: 6 % (ref 0–11)
MYELOCYTES NFR BLD MANUAL: (no result) % (ref 0–0)
NEUTROPHILS # BLD AUTO: 28.5 K/UL (ref 1.8–8.9)
NEUTROPHILS NFR BLD AUTO: 90.7 % (ref 43–81)
NEUTS BAND NFR BLD MANUAL: 3 % (ref 0–5)
NEUTS SEG NFR BLD MANUAL: 87 % (ref 42–76)
OVALOCYTES BLD QL SMEAR: (no result)
PAPPENHEIMER BOD BLD QL SMEAR: (no result)
PHOSPHATE SERPL-MCNC: 4.8 MG/DL (ref 2.5–4.9)
PLASMA CELLS NFR BLD: (no result) %
PLATELET # BLD AUTO: 341 K/UL (ref 150–450)
PLATELET BLD QL SMEAR: ADEQUATE
POTASSIUM SERPL-SCNC: 4.5 MMOL/L (ref 3.5–5.1)
PROMYELOCYTES NFR BLD MANUAL: (no result) % (ref 0–0)
RBC # BLD AUTO: 2.98 MIL/UL (ref 4–5.2)
ROULEAUX BLD QL SMEAR: (no result)
SMUDGE CELLS BLD QL SMEAR: (no result)
SODIUM SERPL-SCNC: 130 MMOL/L (ref 136–145)
STOMATOCYTES BLD QL SMEAR: (no result)
TARGETS BLD QL SMEAR: (no result)
TOXIC GRANULES BLD QL SMEAR: (no result)
VARIANT LYMPHS NFR BLD MANUAL: (no result) % (ref 0–0)
WBC NRBC COR # BLD AUTO: (no result) /100WBC (ref 0–0)
WBC NRBC COR # BLD AUTO: 31.5 K/UL (ref 4.3–11)

## 2025-07-07 RX ADMIN — MEROPENEM SCH MLS/HR: 500 INJECTION INTRAVENOUS at 21:45

## 2025-07-07 RX ADMIN — CLONIDINE HYDROCHLORIDE SCH MG: 0.1 TABLET ORAL at 08:27

## 2025-07-07 RX ADMIN — MERCAPTOPURINE PRN MG: 20 SUSPENSION ORAL at 09:25

## 2025-07-07 RX ADMIN — BUPIVACAINE HYDROCHLORIDE ONE MG: 5 INJECTION, SOLUTION EPIDURAL; INTRACAUDAL; PERINEURAL at 12:00

## 2025-07-07 RX ADMIN — SODIUM HYPOCHLORITE SCH ML: 1.25 SOLUTION TOPICAL at 13:33

## 2025-07-07 RX ADMIN — SERTRALINE HYDROCHLORIDE SCH MG: 25 TABLET, FILM COATED ORAL at 08:14

## 2025-07-07 RX ADMIN — RENAGEL SCH MG: 800 TABLET ORAL at 08:15

## 2025-07-07 RX ADMIN — ACETAMINOPHEN PRN MG: 325 TABLET ORAL at 10:53

## 2025-07-07 RX ADMIN — LIDOCAINE SCH APPLIC: 50 OINTMENT TOPICAL at 13:34

## 2025-07-07 RX ADMIN — Medication SCH MG: at 08:14

## 2025-07-08 VITALS — DIASTOLIC BLOOD PRESSURE: 61 MMHG | OXYGEN SATURATION: 100 % | SYSTOLIC BLOOD PRESSURE: 125 MMHG

## 2025-07-08 VITALS — TEMPERATURE: 97.3 F | DIASTOLIC BLOOD PRESSURE: 73 MMHG | SYSTOLIC BLOOD PRESSURE: 136 MMHG | OXYGEN SATURATION: 100 %

## 2025-07-08 VITALS — SYSTOLIC BLOOD PRESSURE: 99 MMHG | OXYGEN SATURATION: 100 % | DIASTOLIC BLOOD PRESSURE: 61 MMHG

## 2025-07-08 VITALS — OXYGEN SATURATION: 96 % | DIASTOLIC BLOOD PRESSURE: 46 MMHG | SYSTOLIC BLOOD PRESSURE: 110 MMHG

## 2025-07-08 VITALS — OXYGEN SATURATION: 98 % | TEMPERATURE: 98.5 F | SYSTOLIC BLOOD PRESSURE: 92 MMHG | DIASTOLIC BLOOD PRESSURE: 63 MMHG

## 2025-07-08 VITALS — SYSTOLIC BLOOD PRESSURE: 116 MMHG | OXYGEN SATURATION: 97 % | DIASTOLIC BLOOD PRESSURE: 50 MMHG

## 2025-07-08 VITALS — DIASTOLIC BLOOD PRESSURE: 58 MMHG | OXYGEN SATURATION: 97 % | TEMPERATURE: 98.4 F | SYSTOLIC BLOOD PRESSURE: 112 MMHG

## 2025-07-08 VITALS — DIASTOLIC BLOOD PRESSURE: 49 MMHG | OXYGEN SATURATION: 100 % | SYSTOLIC BLOOD PRESSURE: 101 MMHG

## 2025-07-08 VITALS — DIASTOLIC BLOOD PRESSURE: 49 MMHG | OXYGEN SATURATION: 97 % | SYSTOLIC BLOOD PRESSURE: 101 MMHG | TEMPERATURE: 98.4 F

## 2025-07-08 VITALS — OXYGEN SATURATION: 100 % | DIASTOLIC BLOOD PRESSURE: 60 MMHG | SYSTOLIC BLOOD PRESSURE: 95 MMHG

## 2025-07-08 VITALS — SYSTOLIC BLOOD PRESSURE: 116 MMHG | OXYGEN SATURATION: 97 % | DIASTOLIC BLOOD PRESSURE: 53 MMHG

## 2025-07-08 VITALS — TEMPERATURE: 98.1 F | SYSTOLIC BLOOD PRESSURE: 118 MMHG | OXYGEN SATURATION: 100 % | DIASTOLIC BLOOD PRESSURE: 58 MMHG

## 2025-07-08 VITALS — SYSTOLIC BLOOD PRESSURE: 111 MMHG | OXYGEN SATURATION: 97 % | DIASTOLIC BLOOD PRESSURE: 40 MMHG

## 2025-07-08 VITALS — SYSTOLIC BLOOD PRESSURE: 92 MMHG | DIASTOLIC BLOOD PRESSURE: 63 MMHG | OXYGEN SATURATION: 98 %

## 2025-07-08 VITALS — DIASTOLIC BLOOD PRESSURE: 58 MMHG | SYSTOLIC BLOOD PRESSURE: 93 MMHG | OXYGEN SATURATION: 97 %

## 2025-07-08 VITALS — SYSTOLIC BLOOD PRESSURE: 98 MMHG | OXYGEN SATURATION: 97 % | DIASTOLIC BLOOD PRESSURE: 61 MMHG

## 2025-07-08 VITALS — SYSTOLIC BLOOD PRESSURE: 95 MMHG | OXYGEN SATURATION: 100 % | DIASTOLIC BLOOD PRESSURE: 60 MMHG

## 2025-07-08 VITALS — DIASTOLIC BLOOD PRESSURE: 65 MMHG | OXYGEN SATURATION: 100 % | SYSTOLIC BLOOD PRESSURE: 133 MMHG | TEMPERATURE: 98.8 F

## 2025-07-08 LAB
ANISOCYTOSIS BLD QL: (no result)
AUER BODIES BLD QL SMEAR: (no result)
BASOPHILS # BLD AUTO: 0.1 K/UL (ref 0–0.2)
BASOPHILS NFR BLD AUTO: 0.4 % (ref 0–2)
BUN SERPL-MCNC: 27 MG/DL (ref 7–18)
CABOT RINGS BLD QL SMEAR: (no result)
CALCIUM SERPL-MCNC: 9.5 MG/DL (ref 8.5–10.1)
CHLORIDE SERPL-SCNC: 93 MMOL/L (ref 98–107)
CO2 SERPL-SCNC: 27 MMOL/L (ref 21–32)
CREAT SERPL-MCNC: 3.5 MG/DL (ref 0.6–1.3)
DACRYOCYTES BLD QL SMEAR: (no result)
DOHLE BOD BLD QL SMEAR: (no result)
EOSINOPHIL # BLD AUTO: 0.3 K/UL (ref 0–0.7)
EOSINOPHIL NFR BLD AUTO: 1.4 % (ref 0–6)
ERYTHROCYTE [DISTWIDTH] IN BLOOD BY AUTOMATED COUNT: 20.4 % (ref 11.5–15)
GLUCOSE SERPL-MCNC: 117 MG/DL (ref 74–106)
HBV SURFACE AB SER RIA-ACNC: REACTIVE
HCT VFR BLD AUTO: 32 % (ref 33–45)
HELMET CELLS BLD QL SMEAR: (no result)
HGB BLD-MCNC: 9.6 G/DL (ref 11.5–14.8)
HOWELL-JOLLY BOD BLD QL SMEAR: (no result)
HYPOCHROMIA BLD QL: (no result)
LYMPHOCYTES NFR BLD AUTO: 0.9 K/UL (ref 0.8–4.8)
LYMPHOCYTES NFR BLD AUTO: 4.9 % (ref 20–44)
MACROCYTES BLD QL: (no result)
MCH RBC QN AUTO: 30 PG (ref 26–33)
MCHC RBC AUTO-ENTMCNC: 30 G/DL (ref 31–36)
MCV RBC AUTO: 98 FL (ref 82–100)
MONOCYTES NFR BLD AUTO: 1.2 K/UL (ref 0.1–1.3)
MONOCYTES NFR BLD AUTO: 6.4 % (ref 2–12)
NEUTROPHILS # BLD AUTO: 15.7 K/UL (ref 1.8–8.9)
NEUTROPHILS NFR BLD AUTO: 86.9 % (ref 43–81)
OVALOCYTES BLD QL SMEAR: (no result)
PAPPENHEIMER BOD BLD QL SMEAR: (no result)
PLATELET # BLD AUTO: 388 K/UL (ref 150–450)
PLATELET BLD QL SMEAR: (no result)
POTASSIUM SERPL-SCNC: 4.3 MMOL/L (ref 3.5–5.1)
RBC # BLD AUTO: 3.23 MIL/UL (ref 4–5.2)
ROULEAUX BLD QL SMEAR: (no result)
SODIUM SERPL-SCNC: 131 MMOL/L (ref 136–145)
STOMATOCYTES BLD QL SMEAR: (no result)
TARGETS BLD QL SMEAR: (no result)
TOXIC GRANULES BLD QL SMEAR: (no result)
WBC NRBC COR # BLD AUTO: 18.1 K/UL (ref 4.3–11)

## 2025-07-08 PROCEDURE — 0KBT0ZZ EXCISION OF LEFT LOWER LEG MUSCLE, OPEN APPROACH: ICD-10-PCS | Performed by: STUDENT IN AN ORGANIZED HEALTH CARE EDUCATION/TRAINING PROGRAM

## 2025-07-08 PROCEDURE — 0KBS0ZZ EXCISION OF RIGHT LOWER LEG MUSCLE, OPEN APPROACH: ICD-10-PCS | Performed by: STUDENT IN AN ORGANIZED HEALTH CARE EDUCATION/TRAINING PROGRAM

## 2025-07-08 RX ADMIN — INSULIN HUMAN PRN UNITS: 100 INJECTION, SOLUTION PARENTERAL at 13:08

## 2025-07-08 RX ADMIN — DOXYCYCLINE HYCLATE SCH MG: 100 TABLET, COATED ORAL at 08:30

## 2025-07-09 VITALS — OXYGEN SATURATION: 99 % | SYSTOLIC BLOOD PRESSURE: 123 MMHG | TEMPERATURE: 97.5 F | DIASTOLIC BLOOD PRESSURE: 93 MMHG

## 2025-07-09 VITALS — DIASTOLIC BLOOD PRESSURE: 77 MMHG | SYSTOLIC BLOOD PRESSURE: 155 MMHG | TEMPERATURE: 97.9 F | OXYGEN SATURATION: 100 %

## 2025-07-09 VITALS — TEMPERATURE: 98.2 F | DIASTOLIC BLOOD PRESSURE: 52 MMHG | SYSTOLIC BLOOD PRESSURE: 143 MMHG | OXYGEN SATURATION: 100 %

## 2025-07-09 VITALS — OXYGEN SATURATION: 100 % | TEMPERATURE: 98.4 F | DIASTOLIC BLOOD PRESSURE: 91 MMHG | SYSTOLIC BLOOD PRESSURE: 152 MMHG

## 2025-07-09 VITALS — TEMPERATURE: 97.7 F | DIASTOLIC BLOOD PRESSURE: 65 MMHG | SYSTOLIC BLOOD PRESSURE: 140 MMHG | OXYGEN SATURATION: 100 %

## 2025-07-09 VITALS — SYSTOLIC BLOOD PRESSURE: 144 MMHG | DIASTOLIC BLOOD PRESSURE: 56 MMHG | TEMPERATURE: 97.9 F | OXYGEN SATURATION: 100 %

## 2025-07-09 LAB
ALBUMIN SERPL BCP-MCNC: 2.2 G/DL (ref 3.4–5)
ALP SERPL-CCNC: 489 U/L (ref 46–116)
ALT SERPL W P-5'-P-CCNC: 12 U/L (ref 12–78)
ANISOCYTOSIS BLD QL: (no result)
AST SERPL W P-5'-P-CCNC: 25 U/L (ref 15–37)
AUER BODIES BLD QL SMEAR: (no result)
BASOPHILS # BLD AUTO: 0.1 K/UL (ref 0–0.2)
BASOPHILS NFR BLD AUTO: 0.7 % (ref 0–2)
BILIRUB SERPL-MCNC: 0.8 MG/DL (ref 0.2–1)
BUN SERPL-MCNC: 24 MG/DL (ref 7–18)
CABOT RINGS BLD QL SMEAR: (no result)
CALCIUM SERPL-MCNC: 8.9 MG/DL (ref 8.5–10.1)
CHLORIDE SERPL-SCNC: 96 MMOL/L (ref 98–107)
CO2 SERPL-SCNC: 27 MMOL/L (ref 21–32)
CREAT SERPL-MCNC: 2.9 MG/DL (ref 0.6–1.3)
DACRYOCYTES BLD QL SMEAR: (no result)
DOHLE BOD BLD QL SMEAR: (no result)
EOSINOPHIL # BLD AUTO: 0.3 K/UL (ref 0–0.7)
EOSINOPHIL NFR BLD AUTO: 2.5 % (ref 0–6)
ERYTHROCYTE [DISTWIDTH] IN BLOOD BY AUTOMATED COUNT: 20.5 % (ref 11.5–15)
GLUCOSE SERPL-MCNC: 155 MG/DL (ref 74–106)
HCT VFR BLD AUTO: 30 % (ref 33–45)
HELMET CELLS BLD QL SMEAR: (no result)
HGB BLD-MCNC: 9.3 G/DL (ref 11.5–14.8)
HOWELL-JOLLY BOD BLD QL SMEAR: (no result)
HYPOCHROMIA BLD QL: (no result)
LYMPHOCYTES NFR BLD AUTO: 0.5 K/UL (ref 0.8–4.8)
LYMPHOCYTES NFR BLD AUTO: 4.5 % (ref 20–44)
MACROCYTES BLD QL: (no result)
MCH RBC QN AUTO: 30 PG (ref 26–33)
MCHC RBC AUTO-ENTMCNC: 31 G/DL (ref 31–36)
MCV RBC AUTO: 96 FL (ref 82–100)
MONOCYTES NFR BLD AUTO: 1.1 K/UL (ref 0.1–1.3)
MONOCYTES NFR BLD AUTO: 10.8 % (ref 2–12)
NEUTROPHILS # BLD AUTO: 8.4 K/UL (ref 1.8–8.9)
NEUTROPHILS NFR BLD AUTO: 81.5 % (ref 43–81)
OVALOCYTES BLD QL SMEAR: (no result)
PAPPENHEIMER BOD BLD QL SMEAR: (no result)
PLATELET # BLD AUTO: 361 K/UL (ref 150–450)
PLATELET BLD QL SMEAR: (no result)
POTASSIUM SERPL-SCNC: 4.6 MMOL/L (ref 3.5–5.1)
PROT SERPL-MCNC: 6.6 G/DL (ref 6.4–8.2)
RBC # BLD AUTO: 3.15 MIL/UL (ref 4–5.2)
ROULEAUX BLD QL SMEAR: (no result)
SODIUM SERPL-SCNC: 134 MMOL/L (ref 136–145)
STOMATOCYTES BLD QL SMEAR: (no result)
TARGETS BLD QL SMEAR: (no result)
TOXIC GRANULES BLD QL SMEAR: (no result)
WBC NRBC COR # BLD AUTO: 10.3 K/UL (ref 4.3–11)

## 2025-07-10 VITALS — TEMPERATURE: 97.7 F | OXYGEN SATURATION: 100 % | SYSTOLIC BLOOD PRESSURE: 154 MMHG | DIASTOLIC BLOOD PRESSURE: 81 MMHG

## 2025-07-10 VITALS — OXYGEN SATURATION: 100 % | DIASTOLIC BLOOD PRESSURE: 74 MMHG | SYSTOLIC BLOOD PRESSURE: 145 MMHG | TEMPERATURE: 98.4 F

## 2025-07-10 VITALS — SYSTOLIC BLOOD PRESSURE: 147 MMHG | DIASTOLIC BLOOD PRESSURE: 69 MMHG | TEMPERATURE: 97.9 F | OXYGEN SATURATION: 100 %

## 2025-07-10 VITALS — SYSTOLIC BLOOD PRESSURE: 129 MMHG | DIASTOLIC BLOOD PRESSURE: 79 MMHG | OXYGEN SATURATION: 100 % | TEMPERATURE: 97.2 F

## 2025-07-10 VITALS — OXYGEN SATURATION: 100 % | TEMPERATURE: 98 F | DIASTOLIC BLOOD PRESSURE: 76 MMHG | SYSTOLIC BLOOD PRESSURE: 128 MMHG

## 2025-07-10 LAB
ALBUMIN SERPL BCP-MCNC: 2.3 G/DL (ref 3.4–5)
ALP SERPL-CCNC: 554 U/L (ref 46–116)
ALT SERPL W P-5'-P-CCNC: 13 U/L (ref 12–78)
AST SERPL W P-5'-P-CCNC: 38 U/L (ref 15–37)
BILIRUB SERPL-MCNC: 0.9 MG/DL (ref 0.2–1)
BUN SERPL-MCNC: 22 MG/DL (ref 7–18)
CALCIUM SERPL-MCNC: 9 MG/DL (ref 8.5–10.1)
CHLORIDE SERPL-SCNC: 96 MMOL/L (ref 98–107)
CO2 SERPL-SCNC: 29 MMOL/L (ref 21–32)
CREAT SERPL-MCNC: 2.7 MG/DL (ref 0.6–1.3)
GLUCOSE SERPL-MCNC: 138 MG/DL (ref 74–106)
POTASSIUM SERPL-SCNC: 4.5 MMOL/L (ref 3.5–5.1)
PROT SERPL-MCNC: 6.9 G/DL (ref 6.4–8.2)
SODIUM SERPL-SCNC: 133 MMOL/L (ref 136–145)

## 2025-07-10 RX ADMIN — CEFEPIME HYDROCHLORIDE SCH MLS/HR: 1 INJECTION, POWDER, FOR SOLUTION INTRAMUSCULAR; INTRAVENOUS at 21:58

## 2025-07-11 VITALS — SYSTOLIC BLOOD PRESSURE: 137 MMHG | TEMPERATURE: 98.6 F | DIASTOLIC BLOOD PRESSURE: 78 MMHG | OXYGEN SATURATION: 96 %

## 2025-07-11 VITALS — TEMPERATURE: 97.5 F | DIASTOLIC BLOOD PRESSURE: 71 MMHG | SYSTOLIC BLOOD PRESSURE: 150 MMHG | OXYGEN SATURATION: 100 %

## 2025-07-11 VITALS — DIASTOLIC BLOOD PRESSURE: 71 MMHG | TEMPERATURE: 98.4 F | SYSTOLIC BLOOD PRESSURE: 150 MMHG | OXYGEN SATURATION: 98 %

## 2025-07-11 VITALS — SYSTOLIC BLOOD PRESSURE: 162 MMHG | TEMPERATURE: 98.2 F | OXYGEN SATURATION: 100 % | DIASTOLIC BLOOD PRESSURE: 59 MMHG

## 2025-07-11 LAB
ALBUMIN SERPL BCP-MCNC: 2.3 G/DL (ref 3.4–5)
ALP SERPL-CCNC: 520 U/L (ref 46–116)
ALT SERPL W P-5'-P-CCNC: 10 U/L (ref 12–78)
AST SERPL W P-5'-P-CCNC: 24 U/L (ref 15–37)
BILIRUB SERPL-MCNC: 0.8 MG/DL (ref 0.2–1)
BUN SERPL-MCNC: 29 MG/DL (ref 7–18)
CALCIUM SERPL-MCNC: 9.2 MG/DL (ref 8.5–10.1)
CHLORIDE SERPL-SCNC: 94 MMOL/L (ref 98–107)
CO2 SERPL-SCNC: 25 MMOL/L (ref 21–32)
CREAT SERPL-MCNC: 2.9 MG/DL (ref 0.6–1.3)
GLUCOSE SERPL-MCNC: 145 MG/DL (ref 74–106)
POTASSIUM SERPL-SCNC: 4.6 MMOL/L (ref 3.5–5.1)
PROT SERPL-MCNC: 7 G/DL (ref 6.4–8.2)
SODIUM SERPL-SCNC: 130 MMOL/L (ref 136–145)

## 2025-07-11 RX ADMIN — CEFEPIME HYDROCHLORIDE SCH MLS/HR: 1 INJECTION, POWDER, FOR SOLUTION INTRAMUSCULAR; INTRAVENOUS at 22:05

## 2025-07-12 VITALS — TEMPERATURE: 98 F | DIASTOLIC BLOOD PRESSURE: 76 MMHG | SYSTOLIC BLOOD PRESSURE: 153 MMHG | OXYGEN SATURATION: 97 %

## 2025-07-12 VITALS — OXYGEN SATURATION: 95 % | TEMPERATURE: 97.8 F

## 2025-07-12 VITALS — TEMPERATURE: 97.7 F | DIASTOLIC BLOOD PRESSURE: 62 MMHG | SYSTOLIC BLOOD PRESSURE: 141 MMHG

## 2025-07-12 VITALS — TEMPERATURE: 97.9 F | SYSTOLIC BLOOD PRESSURE: 146 MMHG | DIASTOLIC BLOOD PRESSURE: 70 MMHG | OXYGEN SATURATION: 100 %

## 2025-07-12 LAB
BUN SERPL-MCNC: 27 MG/DL (ref 7–18)
CALCIUM SERPL-MCNC: 9.1 MG/DL (ref 8.5–10.1)
CHLORIDE SERPL-SCNC: 97 MMOL/L (ref 98–107)
CO2 SERPL-SCNC: 26 MMOL/L (ref 21–32)
CREAT SERPL-MCNC: 2.6 MG/DL (ref 0.6–1.3)
GLUCOSE SERPL-MCNC: 159 MG/DL (ref 74–106)
POTASSIUM SERPL-SCNC: 4.7 MMOL/L (ref 3.5–5.1)
SODIUM SERPL-SCNC: 132 MMOL/L (ref 136–145)

## 2025-07-12 RX ADMIN — DEXTROSE MONOHYDRATE PRN MG: 50 INJECTION, SOLUTION INTRAVENOUS at 23:28

## 2025-07-13 VITALS — OXYGEN SATURATION: 99 % | DIASTOLIC BLOOD PRESSURE: 59 MMHG | TEMPERATURE: 97.7 F | SYSTOLIC BLOOD PRESSURE: 175 MMHG

## 2025-07-13 VITALS — OXYGEN SATURATION: 95 % | DIASTOLIC BLOOD PRESSURE: 75 MMHG | SYSTOLIC BLOOD PRESSURE: 133 MMHG | TEMPERATURE: 97.7 F

## 2025-07-13 VITALS — OXYGEN SATURATION: 95 % | TEMPERATURE: 98 F | SYSTOLIC BLOOD PRESSURE: 152 MMHG | DIASTOLIC BLOOD PRESSURE: 80 MMHG

## 2025-07-13 VITALS — SYSTOLIC BLOOD PRESSURE: 157 MMHG | OXYGEN SATURATION: 97 % | DIASTOLIC BLOOD PRESSURE: 76 MMHG | TEMPERATURE: 97.9 F

## 2025-07-13 VITALS — OXYGEN SATURATION: 95 % | SYSTOLIC BLOOD PRESSURE: 175 MMHG | TEMPERATURE: 97.5 F | DIASTOLIC BLOOD PRESSURE: 80 MMHG

## 2025-07-14 VITALS — TEMPERATURE: 98.4 F | DIASTOLIC BLOOD PRESSURE: 81 MMHG | OXYGEN SATURATION: 99 % | SYSTOLIC BLOOD PRESSURE: 134 MMHG

## 2025-07-14 VITALS — OXYGEN SATURATION: 99 % | TEMPERATURE: 97.7 F | SYSTOLIC BLOOD PRESSURE: 141 MMHG | DIASTOLIC BLOOD PRESSURE: 64 MMHG

## 2025-07-14 VITALS — TEMPERATURE: 97.7 F | SYSTOLIC BLOOD PRESSURE: 137 MMHG | DIASTOLIC BLOOD PRESSURE: 86 MMHG | OXYGEN SATURATION: 98 %

## 2025-07-14 VITALS — OXYGEN SATURATION: 96 % | DIASTOLIC BLOOD PRESSURE: 67 MMHG | TEMPERATURE: 97.9 F | SYSTOLIC BLOOD PRESSURE: 146 MMHG

## 2025-07-15 VITALS — SYSTOLIC BLOOD PRESSURE: 121 MMHG | OXYGEN SATURATION: 100 % | TEMPERATURE: 98.1 F | DIASTOLIC BLOOD PRESSURE: 76 MMHG

## 2025-07-15 VITALS — OXYGEN SATURATION: 100 % | TEMPERATURE: 98.1 F | SYSTOLIC BLOOD PRESSURE: 152 MMHG | DIASTOLIC BLOOD PRESSURE: 65 MMHG

## 2025-07-15 VITALS — DIASTOLIC BLOOD PRESSURE: 63 MMHG | SYSTOLIC BLOOD PRESSURE: 154 MMHG | TEMPERATURE: 99 F | OXYGEN SATURATION: 100 %

## 2025-07-15 VITALS — SYSTOLIC BLOOD PRESSURE: 157 MMHG | OXYGEN SATURATION: 100 % | TEMPERATURE: 97.3 F | DIASTOLIC BLOOD PRESSURE: 79 MMHG

## 2025-07-15 PROCEDURE — 0KBT0ZZ EXCISION OF LEFT LOWER LEG MUSCLE, OPEN APPROACH: ICD-10-PCS | Performed by: STUDENT IN AN ORGANIZED HEALTH CARE EDUCATION/TRAINING PROGRAM

## 2025-07-15 PROCEDURE — 0KBS0ZZ EXCISION OF RIGHT LOWER LEG MUSCLE, OPEN APPROACH: ICD-10-PCS | Performed by: STUDENT IN AN ORGANIZED HEALTH CARE EDUCATION/TRAINING PROGRAM

## 2025-07-16 VITALS — DIASTOLIC BLOOD PRESSURE: 61 MMHG | TEMPERATURE: 97.8 F | OXYGEN SATURATION: 100 % | SYSTOLIC BLOOD PRESSURE: 142 MMHG

## 2025-07-16 VITALS — DIASTOLIC BLOOD PRESSURE: 79 MMHG | TEMPERATURE: 97.9 F | OXYGEN SATURATION: 100 % | SYSTOLIC BLOOD PRESSURE: 165 MMHG

## 2025-07-16 VITALS — DIASTOLIC BLOOD PRESSURE: 53 MMHG | TEMPERATURE: 99.1 F | SYSTOLIC BLOOD PRESSURE: 108 MMHG | OXYGEN SATURATION: 100 %

## 2025-07-16 VITALS — OXYGEN SATURATION: 100 % | DIASTOLIC BLOOD PRESSURE: 77 MMHG | SYSTOLIC BLOOD PRESSURE: 123 MMHG | TEMPERATURE: 97.2 F

## 2025-07-16 VITALS — SYSTOLIC BLOOD PRESSURE: 123 MMHG | DIASTOLIC BLOOD PRESSURE: 63 MMHG

## 2025-07-16 RX ADMIN — DEXTROSE MONOHYDRATE ONE MLS/HR: 50 INJECTION, SOLUTION INTRAVENOUS at 15:48

## 2025-07-24 ENCOUNTER — HOSPITAL ENCOUNTER (OUTPATIENT)
Dept: HOSPITAL 54 - WOU | Age: 50
Discharge: HOME HEALTH | End: 2025-07-24
Attending: STUDENT IN AN ORGANIZED HEALTH CARE EDUCATION/TRAINING PROGRAM
Payer: MEDICARE

## 2025-07-24 DIAGNOSIS — L97.825: ICD-10-CM

## 2025-07-24 DIAGNOSIS — Z79.899: ICD-10-CM

## 2025-07-24 DIAGNOSIS — L97.815: ICD-10-CM

## 2025-07-24 DIAGNOSIS — Z99.2: ICD-10-CM

## 2025-07-24 DIAGNOSIS — Z95.1: ICD-10-CM

## 2025-07-24 DIAGNOSIS — Z82.49: ICD-10-CM

## 2025-07-24 DIAGNOSIS — I87.313: Primary | ICD-10-CM

## 2025-07-24 DIAGNOSIS — R60.0: ICD-10-CM

## 2025-07-24 DIAGNOSIS — Z88.0: ICD-10-CM

## 2025-07-24 DIAGNOSIS — E11.51: ICD-10-CM

## 2025-07-24 DIAGNOSIS — I25.10: ICD-10-CM

## 2025-07-24 DIAGNOSIS — I12.0: ICD-10-CM

## 2025-07-24 DIAGNOSIS — Z79.4: ICD-10-CM

## 2025-07-24 DIAGNOSIS — Z83.3: ICD-10-CM

## 2025-07-24 DIAGNOSIS — Z79.82: ICD-10-CM

## 2025-07-24 DIAGNOSIS — N18.6: ICD-10-CM

## 2025-07-24 DIAGNOSIS — E11.22: ICD-10-CM

## 2025-07-24 PROCEDURE — A6253 ABSORPT DRG > 48 SQ IN W/O B: HCPCS

## 2025-07-24 PROCEDURE — 11046 DBRDMT MUSC&/FSCA EA ADDL: CPT

## 2025-07-24 PROCEDURE — 11043 DBRDMT MUSC&/FSCA 1ST 20/<: CPT
